# Patient Record
Sex: MALE | Race: WHITE | URBAN - METROPOLITAN AREA
[De-identification: names, ages, dates, MRNs, and addresses within clinical notes are randomized per-mention and may not be internally consistent; named-entity substitution may affect disease eponyms.]

---

## 2017-01-11 ENCOUNTER — APPOINTMENT (RX ONLY)
Dept: URBAN - METROPOLITAN AREA CLINIC 23 | Facility: CLINIC | Age: 72
Setting detail: DERMATOLOGY
End: 2017-01-11

## 2017-01-11 ENCOUNTER — RX ONLY (OUTPATIENT)
Age: 72
Setting detail: RX ONLY
End: 2017-01-11

## 2017-01-11 DIAGNOSIS — L72.8 OTHER FOLLICULAR CYSTS OF THE SKIN AND SUBCUTANEOUS TISSUE: ICD-10-CM

## 2017-01-11 DIAGNOSIS — L91.8 OTHER HYPERTROPHIC DISORDERS OF THE SKIN: ICD-10-CM

## 2017-01-11 DIAGNOSIS — L71.8 OTHER ROSACEA: ICD-10-CM | Status: STABLE

## 2017-01-11 DIAGNOSIS — D22 MELANOCYTIC NEVI: ICD-10-CM

## 2017-01-11 DIAGNOSIS — D18.0 HEMANGIOMA: ICD-10-CM

## 2017-01-11 DIAGNOSIS — L82.0 INFLAMED SEBORRHEIC KERATOSIS: ICD-10-CM

## 2017-01-11 PROBLEM — L85.3 XEROSIS CUTIS: Status: ACTIVE | Noted: 2017-01-11

## 2017-01-11 PROBLEM — D22.61 MELANOCYTIC NEVI OF RIGHT UPPER LIMB, INCLUDING SHOULDER: Status: ACTIVE | Noted: 2017-01-11

## 2017-01-11 PROBLEM — E13.9 OTHER SPECIFIED DIABETES MELLITUS WITHOUT COMPLICATIONS: Status: ACTIVE | Noted: 2017-01-11

## 2017-01-11 PROBLEM — D22.5 MELANOCYTIC NEVI OF TRUNK: Status: ACTIVE | Noted: 2017-01-11

## 2017-01-11 PROBLEM — D18.01 HEMANGIOMA OF SKIN AND SUBCUTANEOUS TISSUE: Status: ACTIVE | Noted: 2017-01-11

## 2017-01-11 PROCEDURE — ? OTHER

## 2017-01-11 PROCEDURE — 11300 SHAVE SKIN LESION 0.5 CM/<: CPT | Mod: 76

## 2017-01-11 PROCEDURE — ? TREATMENT REGIMEN

## 2017-01-11 PROCEDURE — 11300 SHAVE SKIN LESION 0.5 CM/<: CPT

## 2017-01-11 PROCEDURE — ? SHAVE REMOVAL

## 2017-01-11 PROCEDURE — 99214 OFFICE O/P EST MOD 30 MIN: CPT | Mod: 25

## 2017-01-11 PROCEDURE — ? SKIN TAG REMOVAL MULTI

## 2017-01-11 PROCEDURE — ? PRESCRIPTION

## 2017-01-11 PROCEDURE — 17110 DESTRUCTION B9 LES UP TO 14: CPT

## 2017-01-11 PROCEDURE — ? LIQUID NITROGEN

## 2017-01-11 PROCEDURE — ? COUNSELING

## 2017-01-11 PROCEDURE — 11200 RMVL SKIN TAGS UP TO&INC 15: CPT | Mod: 59

## 2017-01-11 RX ORDER — DOXYCYCLINE HYCLATE 50 MG/1
TABLET ORAL
Qty: 90 | Refills: 2 | Status: ERX

## 2017-01-11 ASSESSMENT — LOCATION DETAILED DESCRIPTION DERM
LOCATION DETAILED: LEFT CENTRAL TEMPLE
LOCATION DETAILED: LEFT INFERIOR LATERAL NECK
LOCATION DETAILED: LEFT LATERAL BUTTOCK
LOCATION DETAILED: LEFT CLAVICULAR SKIN
LOCATION DETAILED: RIGHT SUPERIOR MEDIAL UPPER BACK
LOCATION DETAILED: LEFT INFERIOR UPPER BACK
LOCATION DETAILED: RIGHT POSTERIOR SHOULDER
LOCATION DETAILED: LEFT SUPERIOR MEDIAL MALAR CHEEK
LOCATION DETAILED: EPIGASTRIC SKIN
LOCATION DETAILED: LEFT ANTERIOR MEDIAL PROXIMAL THIGH

## 2017-01-11 ASSESSMENT — LOCATION SIMPLE DESCRIPTION DERM
LOCATION SIMPLE: RIGHT SHOULDER
LOCATION SIMPLE: RIGHT UPPER BACK
LOCATION SIMPLE: LEFT CHEEK
LOCATION SIMPLE: LEFT BUTTOCK
LOCATION SIMPLE: LEFT CLAVICULAR SKIN
LOCATION SIMPLE: LEFT THIGH
LOCATION SIMPLE: LEFT UPPER BACK
LOCATION SIMPLE: LEFT ANTERIOR NECK
LOCATION SIMPLE: ABDOMEN
LOCATION SIMPLE: LEFT TEMPLE

## 2017-01-11 ASSESSMENT — LOCATION ZONE DERM
LOCATION ZONE: NECK
LOCATION ZONE: LEG
LOCATION ZONE: TRUNK
LOCATION ZONE: FACE
LOCATION ZONE: ARM

## 2017-01-11 NOTE — PROCEDURE: SKIN TAG REMOVAL MULTI
Total Number Of Lesions Treated: 6
Medical Necessity Clause: This procedure was medically necessary because the lesions that were treated were
Anesthesia Type: 1% lidocaine with 1:200,000 epinephrine and a 1:10 solution of 8.4% sodium bicarbonate
Consent: Written consent obtained and the risks of skin tag removal was reviewed with the patient including but not limited to bleeding, pigmentary change, infection, pain, and remote possibility of scarring.
Hemostasis: Aluminum Chloride
Include Z78.9 (Other Specified Conditions Influencing Health Status) As An Associated Diagnosis?: No
Medical Necessity Information: It is in your best interest to select a reason for this procedure from the list below. All of these items fulfill various CMS LCD requirements except the new and changing color options.
Detail Level: Detailed
Anesthesia Volume In Cc: 0

## 2017-01-11 NOTE — PROCEDURE: SHAVE REMOVAL
Biopsy Method: Double edge Personna blades
Wound Care: Petrolatum
Medical Necessity Clause: This procedure was medically necessary because the lesion is
Size Of Lesion In Cm (Required): 0
Hemostasis: Aluminum Chloride
Path Notes (To The Dermatopathologist): Please check margins.
Medical Necessity Information: It is in your best interest to select a reason for this procedure from the list below. All of these items fulfill various CMS LCD requirements except the new and changing color options.
Render Post-Care Instructions In Note?: no
Consent was obtained from the patient. The risks and benefits to therapy were discussed in detail. Specifically, the risks of infection, scarring, bleeding, prolonged wound healing, incomplete removal, allergy to anesthesia, nerve injury and recurrence were addressed. Prior to the procedure, the treatment site was clearly identified and confirmed by the patient. All components of Universal Protocol/PAUSE Rule completed.
Billing Type: Third-Party Bill
Anesthesia Volume In Cc: 0.5
Post-Care Instructions: I reviewed with the patient in detail post-care instructions. Patient is to keep the biopsy site dry overnight, and then apply bacitracin twice daily until healed. Patient may apply hydrogen peroxide soaks to remove any crusting.
Accession #: [id number]
Detail Level: Detailed
Notification Instructions: Patient will be notified of biopsy results. However, patient instructed to call the office if not contacted within 2 weeks.
Anesthesia Type: 0.5% lidocaine with 1:200,000 epinephrine and a 1:10 solution of 8.4% sodium bicarbonate
Size Of Lesion In Cm (Required): 0.3

## 2017-01-11 NOTE — PROCEDURE: OTHER
Detail Level: Detailed
Other (Free Text): Large open comedone, he wants it removed.
Note Text (......Xxx Chief Complaint.): This diagnosis correlates with the

## 2017-01-11 NOTE — PROCEDURE: MIPS QUALITY
Quality 110: Preventive Care And Screening: Influenza Immunization: Influenza Immunization previously received during influenza season
Detail Level: Detailed
Quality 130: Documentation Of Current Medications In The Medical Record: Current Medications Documented
Quality 111:Pneumonia Vaccination Status For Older Adults: Pneumococcal Vaccination Previously Received

## 2017-01-11 NOTE — PROCEDURE: LIQUID NITROGEN
Medical Necessity Information: It is in your best interest to select a reason for this procedure from the list below. All of these items fulfill various CMS LCD requirements except the new and changing color options.
Detail Level: Detailed
Post-Care Instructions: I reviewed with the patient in detail post-care instructions. Patient is to wear sunprotection, and avoid picking at any of the treated lesions. Pt may apply Vaseline to crusted or scabbing areas.
Duration Of Freeze Thaw-Cycle (Seconds): 5
Render Post-Care Instructions In Note?: no
Consent: The patient's verbal consent was obtained including but not limited to risks of crusting, scabbing, blistering, scarring, darker or lighter pigmentary change, recurrence, incomplete removal and infection.
Number Of Freeze-Thaw Cycles: 1 freeze-thaw cycle
Medical Necessity Clause: Treatment was medically necessary because the spot was getting

## 2017-01-14 ENCOUNTER — APPOINTMENT (RX ONLY)
Dept: URBAN - METROPOLITAN AREA CLINIC 23 | Facility: CLINIC | Age: 72
Setting detail: DERMATOLOGY
End: 2017-01-14

## 2017-01-14 DIAGNOSIS — L72.8 OTHER FOLLICULAR CYSTS OF THE SKIN AND SUBCUTANEOUS TISSUE: ICD-10-CM

## 2017-01-14 PROCEDURE — 12051 INTMD RPR FACE/MM 2.5 CM/<: CPT | Mod: 79

## 2017-01-14 PROCEDURE — ? EXCISION

## 2017-01-14 PROCEDURE — 11442 EXC FACE-MM B9+MARG 1.1-2 CM: CPT | Mod: 79

## 2017-01-14 ASSESSMENT — LOCATION SIMPLE DESCRIPTION DERM: LOCATION SIMPLE: LEFT TEMPLE

## 2017-01-14 ASSESSMENT — LOCATION ZONE DERM: LOCATION ZONE: FACE

## 2017-01-14 ASSESSMENT — LOCATION DETAILED DESCRIPTION DERM: LOCATION DETAILED: LEFT CENTRAL TEMPLE

## 2017-01-14 NOTE — PROCEDURE: EXCISION
Posterior Auricular Interpolation Flap Text: A decision was made to reconstruct the defect utilizing an interpolation axial flap and a staged reconstruction.  A telfa template was made of the defect.  This telfa template was then used to outline the posterior auricular interpolation flap.  The donor area for the pedicle flap was then injected with anesthesia.  The flap was excised through the skin and subcutaneous tissue down to the layer of the underlying musculature.  The pedicle flap was carefully excised within this deep plane to maintain its blood supply.  The edges of the donor site were undermined.   The donor site was closed in a primary fashion.  The pedicle was then rotated into position and sutured.  Once the tube was sutured into place, adequate blood supply was confirmed with blanching and refill.  The pedicle was then wrapped with xeroform gauze and dressed appropriately with a telfa and gauze bandage to ensure continued blood supply and protect the attached pedicle.
Trilobed Flap Text: The defect edges were debeveled with a #15 scalpel blade.  Given the location of the defect and the proximity to free margins a trilobed flap was deemed most appropriate.  Using a sterile surgical marker, an appropriate trilobed flap drawn around the defect.    The area thus outlined was incised deep to adipose tissue with a #15 scalpel blade.  The skin margins were undermined to an appropriate distance in all directions utilizing iris scissors.
Additional Primary Defect Length (In Cm): 0
Rhombic Flap Text: The defect edges were debeveled with a #15 scalpel blade.  Given the location of the defect and the proximity to free margins a rhombic flap was deemed most appropriate.  Using a sterile surgical marker, an appropriate rhombic flap was drawn incorporating the defect.    The area thus outlined was incised deep to adipose tissue with a #15 scalpel blade.  The skin margins were undermined to an appropriate distance in all directions utilizing iris scissors.
Modified Advancement Flap Text: The defect edges were debeveled with a #15 scalpel blade.  Given the location of the defect, shape of the defect and the proximity to free margins a modified advancement flap was deemed most appropriate.  Using a sterile surgical marker, an appropriate advancement flap was drawn incorporating the defect and placing the expected incisions within the relaxed skin tension lines where possible.    The area thus outlined was incised deep to adipose tissue with a #15 scalpel blade.  The skin margins were undermined to an appropriate distance in all directions utilizing iris scissors.
Complex Repair Preamble Text (Leave Blank If You Do Not Want): Extensive wide undermining was performed.
Complex Repair And Split-Thickness Skin Graft Text: The defect edges were debeveled with a #15 scalpel blade.  The primary defect was closed partially with a complex linear closure.  Given the location of the defect, shape of the defect and the proximity to free margins a split thickness skin graft was deemed most appropriate to repair the remaining defect.  The graft was trimmed to fit the size of the remaining defect.  The graft was then placed in the primary defect, oriented appropriately, and sutured into place.
Complex Repair And Xenograft Text: The defect edges were debeveled with a #15 scalpel blade.  The primary defect was closed partially with a complex linear closure.  Given the location of the defect, shape of the defect and the proximity to free margins an tissue cultured epidermal autograft was deemed most appropriate to repair the remaining defect.  The graft was trimmed to fit the size of the remaining defect.  The graft was then placed in the primary defect, oriented appropriately, and sutured into place.
O-Z Plasty Text: The defect edges were debeveled with a #15 scalpel blade.  Given the location of the defect, shape of the defect and the proximity to free margins an O-Z plasty (double transposition flap) was deemed most appropriate.  Using a sterile surgical marker, the appropriate transposition flaps were drawn incorporating the defect and placing the expected incisions within the relaxed skin tension lines where possible.    The area thus outlined was incised deep to adipose tissue with a #15 scalpel blade.  The skin margins were undermined to an appropriate distance in all directions utilizing iris scissors.  Hemostasis was achieved with electrocautery.  The flaps were then transposed into place, one clockwise and the other counterclockwise, and anchored with interrupted buried subcutaneous sutures.
Complex Repair And M Plasty Text: The defect edges were debeveled with a #15 scalpel blade.  The primary defect was closed partially with a complex linear closure.  Given the location of the remaining defect, shape of the defect and the proximity to free margins an M plasty was deemed most appropriate for complete closure of the defect.  Using a sterile surgical marker, an appropriate advancement flap was drawn incorporating the defect and placing the expected incisions within the relaxed skin tension lines where possible.    The area thus outlined was incised deep to adipose tissue with a #15 scalpel blade.  The skin margins were undermined to an appropriate distance in all directions utilizing iris scissors.
Excision Depth: adipose tissue
Dressing: pressure dressing
Epidermal Autograft Text: The defect edges were debeveled with a #15 scalpel blade.  Given the location of the defect, shape of the defect and the proximity to free margins an epidermal autograft was deemed most appropriate.  Using a sterile surgical marker, the primary defect shape was transferred to the donor site. The epidermal graft was then harvested.  The skin graft was then placed in the primary defect and oriented appropriately.
Bilobed Flap Text: The defect edges were debeveled with a #15 scalpel blade.  Given the location of the defect and the proximity to free margins a bilobe flap was deemed most appropriate.  Using a sterile surgical marker, an appropriate bilobe flap drawn around the defect.    The area thus outlined was incised deep to adipose tissue with a #15 scalpel blade.  The skin margins were undermined to an appropriate distance in all directions utilizing iris scissors.
Complex Repair And Epidermal Autograft Text: The defect edges were debeveled with a #15 scalpel blade.  The primary defect was closed partially with a complex linear closure.  Given the location of the defect, shape of the defect and the proximity to free margins an epidermal autograft was deemed most appropriate to repair the remaining defect.  The graft was trimmed to fit the size of the remaining defect.  The graft was then placed in the primary defect, oriented appropriately, and sutured into place.
W Plasty Text: The lesion was extirpated to the level of the fat with a #15 scalpel blade.  Given the location of the defect, shape of the defect and the proximity to free margins a W-plasty was deemed most appropriate for repair.  Using a sterile surgical marker, the appropriate transposition arms of the W-plasty were drawn incorporating the defect and placing the expected incisions within the relaxed skin tension lines where possible.    The area thus outlined was incised deep to adipose tissue with a #15 scalpel blade.  The skin margins were undermined to an appropriate distance in all directions utilizing iris scissors.  The opposing transposition arms were then transposed into place in opposite direction and anchored with interrupted buried subcutaneous sutures.
Mucosal Advancement Flap Text: Given the location of the defect, shape of the defect and the proximity to free margins a mucosal advancement flap was deemed most appropriate. Incisions were made with a 15 blade scalpel in the appropriate fashion along the cutaneous vermillion border and the mucosal lip. The remaining actinically damaged mucosal tissue was excised.  The mucosal advancement flap was then elevated to the gingival sulcus with care taken to preserve the neurovascular structures and advanced into the primary defect. Care was taken to ensure that precise realignment of the vermillion border was achieved.
Detail Level: Detailed
Bi-Rhombic Flap Text: The defect edges were debeveled with a #15 scalpel blade.  Given the location of the defect and the proximity to free margins a bi-rhombic flap was deemed most appropriate.  Using a sterile surgical marker, an appropriate rhombic flap was drawn incorporating the defect. The area thus outlined was incised deep to adipose tissue with a #15 scalpel blade.  The skin margins were undermined to an appropriate distance in all directions utilizing iris scissors.
Crescentic Advancement Flap Text: The defect edges were debeveled with a #15 scalpel blade.  Given the location of the defect and the proximity to free margins a crescentic advancement flap was deemed most appropriate.  Using a sterile surgical marker, the appropriate advancement flap was drawn incorporating the defect and placing the expected incisions within the relaxed skin tension lines where possible.    The area thus outlined was incised deep to adipose tissue with a #15 scalpel blade.  The skin margins were undermined to an appropriate distance in all directions utilizing iris scissors.
Wound Care: Vaseline
Complex Repair And Bilobe Flap Text: The defect edges were debeveled with a #15 scalpel blade.  The primary defect was closed partially with a complex linear closure.  Given the location of the remaining defect, shape of the defect and the proximity to free margins a bilobe flap was deemed most appropriate for complete closure of the defect.  Using a sterile surgical marker, an appropriate advancement flap was drawn incorporating the defect and placing the expected incisions within the relaxed skin tension lines where possible.    The area thus outlined was incised deep to adipose tissue with a #15 scalpel blade.  The skin margins were undermined to an appropriate distance in all directions utilizing iris scissors.
V-Y Flap Text: The defect edges were debeveled with a #15 scalpel blade.  Given the location of the defect, shape of the defect and the proximity to free margins a V-Y flap was deemed most appropriate.  Using a sterile surgical marker, an appropriate advancement flap was drawn incorporating the defect and placing the expected incisions within the relaxed skin tension lines where possible.    The area thus outlined was incised deep to adipose tissue with a #15 scalpel blade.  The skin margins were undermined to an appropriate distance in all directions utilizing iris scissors.
Spiral Flap Text: The defect edges were debeveled with a #15 scalpel blade.  Given the location of the defect, shape of the defect and the proximity to free margins a spiral flap was deemed most appropriate.  Using a sterile surgical marker, an appropriate rotation flap was drawn incorporating the defect and placing the expected incisions within the relaxed skin tension lines where possible. The area thus outlined was incised deep to adipose tissue with a #15 scalpel blade.  The skin margins were undermined to an appropriate distance in all directions utilizing iris scissors.
Complex Repair And W Plasty Text: The defect edges were debeveled with a #15 scalpel blade.  The primary defect was closed partially with a complex linear closure.  Given the location of the remaining defect, shape of the defect and the proximity to free margins a W plasty was deemed most appropriate for complete closure of the defect.  Using a sterile surgical marker, an appropriate advancement flap was drawn incorporating the defect and placing the expected incisions within the relaxed skin tension lines where possible.    The area thus outlined was incised deep to adipose tissue with a #15 scalpel blade.  The skin margins were undermined to an appropriate distance in all directions utilizing iris scissors.
Island Pedicle Flap With Canthal Suspension Text: The defect edges were debeveled with a #15 scalpel blade.  Given the location of the defect, shape of the defect and the proximity to free margins an island pedicle advancement flap was deemed most appropriate.  Using a sterile surgical marker, an appropriate advancement flap was drawn incorporating the defect, outlining the appropriate donor tissue and placing the expected incisions within the relaxed skin tension lines where possible. The area thus outlined was incised deep to adipose tissue with a #15 scalpel blade.  The skin margins were undermined to an appropriate distance in all directions around the primary defect and laterally outward around the island pedicle utilizing iris scissors.  There was minimal undermining beneath the pedicle flap. A suspension suture was placed in the canthal tendon to prevent tension and prevent ectropion.
Include Z78.9 (Other Specified Conditions Influencing Health Status) As An Associated Diagnosis?: No
Advancement Flap (Single) Text: The defect edges were debeveled with a #15 scalpel blade.  Given the location of the defect and the proximity to free margins a single advancement flap was deemed most appropriate.  Using a sterile surgical marker, an appropriate advancement flap was drawn incorporating the defect and placing the expected incisions within the relaxed skin tension lines where possible.    The area thus outlined was incised deep to adipose tissue with a #15 scalpel blade.  The skin margins were undermined to an appropriate distance in all directions utilizing iris scissors.
Complex Repair And Single Advancement Flap Text: The defect edges were debeveled with a #15 scalpel blade.  The primary defect was closed partially with a complex linear closure.  Given the location of the remaining defect, shape of the defect and the proximity to free margins a single advancement flap was deemed most appropriate for complete closure of the defect.  Using a sterile surgical marker, an appropriate advancement flap was drawn incorporating the defect and placing the expected incisions within the relaxed skin tension lines where possible.    The area thus outlined was incised deep to adipose tissue with a #15 scalpel blade.  The skin margins were undermined to an appropriate distance in all directions utilizing iris scissors.
Size Of Margin In Cm: 0.1
Epidermal Closure: running
Path Notes (To The Dermatopathologist): Please check margins.
Medical Necessity Clause: This procedure was medically necessary because the lesion that was treated was:
Burow's Advancement Flap Text: The defect edges were debeveled with a #15 scalpel blade.  Given the location of the defect and the proximity to free margins a Burow's advancement flap was deemed most appropriate.  Using a sterile surgical marker, the appropriate advancement flap was drawn incorporating the defect and placing the expected incisions within the relaxed skin tension lines where possible.    The area thus outlined was incised deep to adipose tissue with a #15 scalpel blade.  The skin margins were undermined to an appropriate distance in all directions utilizing iris scissors.
Bilateral Helical Rim Advancement Flap Text: The defect edges were debeveled with a #15 blade scalpel.  Given the location of the defect and the proximity to free margins (helical rim) a bilateral helical rim advancement flap was deemed most appropriate.  Using a sterile surgical marker, the appropriate advancement flaps were drawn incorporating the defect and placing the expected incisions between the helical rim and antihelix where possible.  The area thus outlined was incised through and through with a #15 scalpel blade.  With a skin hook and iris scissors, the flaps were gently and sharply undermined and freed up.
Complex Repair And Modified Advancement Flap Text: The defect edges were debeveled with a #15 scalpel blade.  The primary defect was closed partially with a complex linear closure.  Given the location of the remaining defect, shape of the defect and the proximity to free margins a modified advancement flap was deemed most appropriate for complete closure of the defect.  Using a sterile surgical marker, an appropriate advancement flap was drawn incorporating the defect and placing the expected incisions within the relaxed skin tension lines where possible.    The area thus outlined was incised deep to adipose tissue with a #15 scalpel blade.  The skin margins were undermined to an appropriate distance in all directions utilizing iris scissors.
Billing Type: Third-Party Bill
Hatchet Flap Text: The defect edges were debeveled with a #15 scalpel blade.  Given the location of the defect, shape of the defect and the proximity to free margins a hatchet flap was deemed most appropriate.  Using a sterile surgical marker, an appropriate hatchet flap was drawn incorporating the defect and placing the expected incisions within the relaxed skin tension lines where possible.    The area thus outlined was incised deep to adipose tissue with a #15 scalpel blade.  The skin margins were undermined to an appropriate distance in all directions utilizing iris scissors.
Saucerization Excision Additional Text (Leave Blank If You Do Not Want): The margin was drawn around the clinically apparent lesion.  Incisions were then made along these lines, in a tangential fashion, to the appropriate tissue plane and the lesion was extirpated.
Intermediate Repair Preamble Text (Leave Blank If You Do Not Want): Undermining was performed with blunt dissection.
Muscle Hinge Flap Text: The defect edges were debeveled with a #15 scalpel blade.  Given the size, depth and location of the defect and the proximity to free margins a muscle hinge flap was deemed most appropriate.  Using a sterile surgical marker, an appropriate hinge flap was drawn incorporating the defect. The area thus outlined was incised with a #15 scalpel blade.  The skin margins were undermined to an appropriate distance in all directions utilizing iris scissors.
Complex Repair And Transposition Flap Text: The defect edges were debeveled with a #15 scalpel blade.  The primary defect was closed partially with a complex linear closure.  Given the location of the remaining defect, shape of the defect and the proximity to free margins a transposition flap was deemed most appropriate for complete closure of the defect.  Using a sterile surgical marker, an appropriate advancement flap was drawn incorporating the defect and placing the expected incisions within the relaxed skin tension lines where possible.    The area thus outlined was incised deep to adipose tissue with a #15 scalpel blade.  The skin margins were undermined to an appropriate distance in all directions utilizing iris scissors.
Ftsg Text: The defect edges were debeveled with a #15 scalpel blade.  Given the location of the defect, shape of the defect and the proximity to free margins a full thickness skin graft was deemed most appropriate.  Using a sterile surgical marker, the primary defect shape was transferred to the donor site. The area thus outlined was incised deep to adipose tissue with a #15 scalpel blade.  The harvested graft was then trimmed of adipose tissue until only dermis and epidermis was left.  The skin margins of the secondary defect were undermined to an appropriate distance in all directions utilizing iris scissors.  The secondary defect was closed with interrupted buried subcutaneous sutures.  The skin edges were then re-apposed with running  sutures.  The skin graft was then placed in the primary defect and oriented appropriately.
Complex Repair And A-T Advancement Flap Text: The defect edges were debeveled with a #15 scalpel blade.  The primary defect was closed partially with a complex linear closure.  Given the location of the remaining defect, shape of the defect and the proximity to free margins an A-T advancement flap was deemed most appropriate for complete closure of the defect.  Using a sterile surgical marker, an appropriate advancement flap was drawn incorporating the defect and placing the expected incisions within the relaxed skin tension lines where possible.    The area thus outlined was incised deep to adipose tissue with a #15 scalpel blade.  The skin margins were undermined to an appropriate distance in all directions utilizing iris scissors.
Island Pedicle Flap-Requiring Vessel Identification Text: The defect edges were debeveled with a #15 scalpel blade.  Given the location of the defect, shape of the defect and the proximity to free margins an island pedicle advancement flap was deemed most appropriate.  Using a sterile surgical marker, an appropriate advancement flap was drawn, based on the axial vessel mentioned above, incorporating the defect, outlining the appropriate donor tissue and placing the expected incisions within the relaxed skin tension lines where possible.    The area thus outlined was incised deep to adipose tissue with a #15 scalpel blade.  The skin margins were undermined to an appropriate distance in all directions around the primary defect and laterally outward around the island pedicle utilizing iris scissors.  There was minimal undermining beneath the pedicle flap.
Complex Repair And Double Advancement Flap Text: The defect edges were debeveled with a #15 scalpel blade.  The primary defect was closed partially with a complex linear closure.  Given the location of the remaining defect, shape of the defect and the proximity to free margins a double advancement flap was deemed most appropriate for complete closure of the defect.  Using a sterile surgical marker, an appropriate advancement flap was drawn incorporating the defect and placing the expected incisions within the relaxed skin tension lines where possible.    The area thus outlined was incised deep to adipose tissue with a #15 scalpel blade.  The skin margins were undermined to an appropriate distance in all directions utilizing iris scissors.
Complex Repair And O-L Flap Text: The defect edges were debeveled with a #15 scalpel blade.  The primary defect was closed partially with a complex linear closure.  Given the location of the remaining defect, shape of the defect and the proximity to free margins an O-L flap was deemed most appropriate for complete closure of the defect.  Using a sterile surgical marker, an appropriate flap was drawn incorporating the defect and placing the expected incisions within the relaxed skin tension lines where possible.    The area thus outlined was incised deep to adipose tissue with a #15 scalpel blade.  The skin margins were undermined to an appropriate distance in all directions utilizing iris scissors.
Bilobed Transposition Flap Text: The defect edges were debeveled with a #15 scalpel blade.  Given the location of the defect and the proximity to free margins a bilobed transposition flap was deemed most appropriate.  Using a sterile surgical marker, an appropriate bilobe flap drawn around the defect.    The area thus outlined was incised deep to adipose tissue with a #15 scalpel blade.  The skin margins were undermined to an appropriate distance in all directions utilizing iris scissors.
Complex Repair And Z Plasty Text: The defect edges were debeveled with a #15 scalpel blade.  The primary defect was closed partially with a complex linear closure.  Given the location of the remaining defect, shape of the defect and the proximity to free margins a Z plasty was deemed most appropriate for complete closure of the defect.  Using a sterile surgical marker, an appropriate advancement flap was drawn incorporating the defect and placing the expected incisions within the relaxed skin tension lines where possible.    The area thus outlined was incised deep to adipose tissue with a #15 scalpel blade.  The skin margins were undermined to an appropriate distance in all directions utilizing iris scissors.
Tissue Cultured Epidermal Autograft Text: The defect edges were debeveled with a #15 scalpel blade.  Given the location of the defect, shape of the defect and the proximity to free margins a tissue cultured epidermal autograft was deemed most appropriate.  The graft was then trimmed to fit the size of the defect.  The graft was then placed in the primary defect and oriented appropriately.
Fusiform Excision Additional Text (Leave Blank If You Do Not Want): The margin was drawn around the clinically apparent lesion.  A fusiform shape was then drawn on the skin incorporating the lesion and margins.  Incisions were then made along these lines to the appropriate tissue plane and the lesion was extirpated.
V-Y Plasty Text: The defect edges were debeveled with a #15 scalpel blade.  Given the location of the defect, shape of the defect and the proximity to free margins an V-Y advancement flap was deemed most appropriate.  Using a sterile surgical marker, an appropriate advancement flap was drawn incorporating the defect and placing the expected incisions within the relaxed skin tension lines where possible.    The area thus outlined was incised deep to adipose tissue with a #15 scalpel blade.  The skin margins were undermined to an appropriate distance in all directions utilizing iris scissors.
Accession #: [id number]
Repair Type: Intermediate
Complex Repair And Skin Substitute Graft Text: The defect edges were debeveled with a #15 scalpel blade.  The primary defect was closed partially with a complex linear closure.  Given the location of the remaining defect, shape of the defect and the proximity to free margins a skin substitute graft was deemed most appropriate to repair the remaining defect.  The graft was trimmed to fit the size of the remaining defect.  The graft was then placed in the primary defect, oriented appropriately, and sutured into place.
Suture Removal: 7 days
Double Island Pedicle Flap Text: The defect edges were debeveled with a #15 scalpel blade.  Given the location of the defect, shape of the defect and the proximity to free margins a double island pedicle advancement flap was deemed most appropriate.  Using a sterile surgical marker, an appropriate advancement flap was drawn incorporating the defect, outlining the appropriate donor tissue and placing the expected incisions within the relaxed skin tension lines where possible.    The area thus outlined was incised deep to adipose tissue with a #15 scalpel blade.  The skin margins were undermined to an appropriate distance in all directions around the primary defect and laterally outward around the island pedicle utilizing iris scissors.  There was minimal undermining beneath the pedicle flap.
Z Plasty Text: The lesion was extirpated to the level of the fat with a #15 scalpel blade.  Given the location of the defect, shape of the defect and the proximity to free margins a Z-plasty was deemed most appropriate for repair.  Using a sterile surgical marker, the appropriate transposition arms of the Z-plasty were drawn incorporating the defect and placing the expected incisions within the relaxed skin tension lines where possible.    The area thus outlined was incised deep to adipose tissue with a #15 scalpel blade.  The skin margins were undermined to an appropriate distance in all directions utilizing iris scissors.  The opposing transposition arms were then transposed into place in opposite direction and anchored with interrupted buried subcutaneous sutures.
Excision Method: Elliptical
Home Suture Removal Text: Patient was provided a home suture removal kit and will remove their sutures at home.  If they have any questions or difficulties they will call the office.
Complex Repair And Rhombic Flap Text: The defect edges were debeveled with a #15 scalpel blade.  The primary defect was closed partially with a complex linear closure.  Given the location of the remaining defect, shape of the defect and the proximity to free margins a rhombic flap was deemed most appropriate for complete closure of the defect.  Using a sterile surgical marker, an appropriate advancement flap was drawn incorporating the defect and placing the expected incisions within the relaxed skin tension lines where possible.    The area thus outlined was incised deep to adipose tissue with a #15 scalpel blade.  The skin margins were undermined to an appropriate distance in all directions utilizing iris scissors.
Island Pedicle Flap Text: The defect edges were debeveled with a #15 scalpel blade.  Given the location of the defect, shape of the defect and the proximity to free margins an island pedicle advancement flap was deemed most appropriate.  Using a sterile surgical marker, an appropriate advancement flap was drawn incorporating the defect, outlining the appropriate donor tissue and placing the expected incisions within the relaxed skin tension lines where possible.    The area thus outlined was incised deep to adipose tissue with a #15 scalpel blade.  The skin margins were undermined to an appropriate distance in all directions around the primary defect and laterally outward around the island pedicle utilizing iris scissors.  There was minimal undermining beneath the pedicle flap.
Complex Repair And Dorsal Nasal Flap Text: The defect edges were debeveled with a #15 scalpel blade.  The primary defect was closed partially with a complex linear closure.  Given the location of the remaining defect, shape of the defect and the proximity to free margins a dorsal nasal flap was deemed most appropriate for complete closure of the defect.  Using a sterile surgical marker, an appropriate flap was drawn incorporating the defect and placing the expected incisions within the relaxed skin tension lines where possible.    The area thus outlined was incised deep to adipose tissue with a #15 scalpel blade.  The skin margins were undermined to an appropriate distance in all directions utilizing iris scissors.
O-T Plasty Text: The defect edges were debeveled with a #15 scalpel blade.  Given the location of the defect, shape of the defect and the proximity to free margins an O-T plasty was deemed most appropriate.  Using a sterile surgical marker, an appropriate O-T plasty was drawn incorporating the defect and placing the expected incisions within the relaxed skin tension lines where possible.    The area thus outlined was incised deep to adipose tissue with a #15 scalpel blade.  The skin margins were undermined to an appropriate distance in all directions utilizing iris scissors.
Mastoid Interpolation Flap Text: A decision was made to reconstruct the defect utilizing an interpolation axial flap and a staged reconstruction.  A telfa template was made of the defect.  This telfa template was then used to outline the mastoid interpolation flap.  The donor area for the pedicle flap was then injected with anesthesia.  The flap was excised through the skin and subcutaneous tissue down to the layer of the underlying musculature.  The pedicle flap was carefully excised within this deep plane to maintain its blood supply.  The edges of the donor site were undermined.   The donor site was closed in a primary fashion.  The pedicle was then rotated into position and sutured.  Once the tube was sutured into place, adequate blood supply was confirmed with blanching and refill.  The pedicle was then wrapped with xeroform gauze and dressed appropriately with a telfa and gauze bandage to ensure continued blood supply and protect the attached pedicle.
Helical Rim Advancement Flap Text: The defect edges were debeveled with a #15 blade scalpel.  Given the location of the defect and the proximity to free margins (helical rim) a double helical rim advancement flap was deemed most appropriate.  Using a sterile surgical marker, the appropriate advancement flaps were drawn incorporating the defect and placing the expected incisions between the helical rim and antihelix where possible.  The area thus outlined was incised through and through with a #15 scalpel blade.  With a skin hook and iris scissors, the flaps were gently and sharply undermined and freed up.
Intermediate / Complex Repair - Final Wound Length In Cm: 2.1
Epidermal Sutures: 5-0 Prolene
Consent was obtained from the patient. The risks and benefits to therapy were discussed in detail. Specifically, the risks of infection, scarring, bleeding, prolonged wound healing, incomplete removal, allergy to anesthesia, nerve injury and recurrence were addressed. Prior to the procedure, the treatment site was clearly identified and confirmed by the patient.
Dorsal Nasal Flap Text: The defect edges were debeveled with a #15 scalpel blade.  Given the location of the defect and the proximity to free margins a dorsal nasal flap was deemed most appropriate.  Using a sterile surgical marker, an appropriate dorsal nasal flap was drawn around the defect.    The area thus outlined was incised deep to adipose tissue with a #15 scalpel blade.  The skin margins were undermined to an appropriate distance in all directions utilizing iris scissors.
Excisional Biopsy Additional Text (Leave Blank If You Do Not Want): The margin was drawn around the clinically apparent lesion.  An elliptical shape was then drawn on the skin incorporating the lesion and margins.  Incisions were then made along these lines to the appropriate tissue plane and the lesion was extirpated.
O-L Flap Text: The defect edges were debeveled with a #15 scalpel blade.  Given the location of the defect, shape of the defect and the proximity to free margins an O-L flap was deemed most appropriate.  Using a sterile surgical marker, an appropriate advancement flap was drawn incorporating the defect and placing the expected incisions within the relaxed skin tension lines where possible.    The area thus outlined was incised deep to adipose tissue with a #15 scalpel blade.  The skin margins were undermined to an appropriate distance in all directions utilizing iris scissors.
Alar Island Pedicle Flap Text: The defect edges were debeveled with a #15 scalpel blade.  Given the location of the defect, shape of the defect and the proximity to the alar rim an island pedicle advancement flap was deemed most appropriate.  Using a sterile surgical marker, an appropriate advancement flap was drawn incorporating the defect, outlining the appropriate donor tissue and placing the expected incisions within the nasal ala running parallel to the alar rim. The area thus outlined was incised with a #15 scalpel blade.  The skin margins were undermined minimally to an appropriate distance in all directions around the primary defect and laterally outward around the island pedicle utilizing iris scissors.  There was minimal undermining beneath the pedicle flap.
Hemostasis: Electrocautery
Complex Repair And V-Y Plasty Text: The defect edges were debeveled with a #15 scalpel blade.  The primary defect was closed partially with a complex linear closure.  Given the location of the remaining defect, shape of the defect and the proximity to free margins a V-Y plasty was deemed most appropriate for complete closure of the defect.  Using a sterile surgical marker, an appropriate advancement flap was drawn incorporating the defect and placing the expected incisions within the relaxed skin tension lines where possible.    The area thus outlined was incised deep to adipose tissue with a #15 scalpel blade.  The skin margins were undermined to an appropriate distance in all directions utilizing iris scissors.
Complex Repair And Ftsg Text: The defect edges were debeveled with a #15 scalpel blade.  The primary defect was closed partially with a complex linear closure.  Given the location of the defect, shape of the defect and the proximity to free margins a full thickness skin graft was deemed most appropriate to repair the remaining defect.  The graft was trimmed to fit the size of the remaining defect.  The graft was then placed in the primary defect, oriented appropriately, and sutured into place.
Paramedian Forehead Flap Text: A decision was made to reconstruct the defect utilizing an interpolation axial flap and a staged reconstruction.  A telfa template was made of the defect.  This telfa template was then used to outline the paramedian forehead pedicle flap.  The donor area for the pedicle flap was then injected with anesthesia.  The flap was excised through the skin and subcutaneous tissue down to the layer of the underlying musculature.  The pedicle flap was carefully excised within this deep plane to maintain its blood supply.  The edges of the donor site were undermined.   The donor site was closed in a primary fashion.  The pedicle was then rotated into position and sutured.  Once the tube was sutured into place, adequate blood supply was confirmed with blanching and refill.  The pedicle was then wrapped with xeroform gauze and dressed appropriately with a telfa and gauze bandage to ensure continued blood supply and protect the attached pedicle.
Lip Wedge Excision Repair Text: Given the location of the defect and the proximity to free margins a full thickness wedge repair was deemed most appropriate.  Using a sterile surgical marker, the appropriate repair was drawn incorporating the defect and placing the expected incisions perpendicular to the vermillion border.  The vermillion border was also meticulously outlined to ensure appropriate reapproximation during the repair.  The area thus outlined was incised through and through with a #15 scalpel blade.  The muscularis and dermis were reaproximated with deep sutures following hemostasis. Care was taken to realign the vermillion border before proceeding with the superficial closure.  Once the vermillion was realigned the superfical and mucosal closure was finished.
Advancement Flap (Double) Text: The defect edges were debeveled with a #15 scalpel blade.  Given the location of the defect and the proximity to free margins a double advancement flap was deemed most appropriate.  Using a sterile surgical marker, the appropriate advancement flaps were drawn incorporating the defect and placing the expected incisions within the relaxed skin tension lines where possible.    The area thus outlined was incised deep to adipose tissue with a #15 scalpel blade.  The skin margins were undermined to an appropriate distance in all directions utilizing iris scissors.
Anesthesia Type: 1% lidocaine with epinephrine
Medical Necessity Clause: The excision was medically necessary because the lesion which was excised was:
Complex Repair And Melolabial Flap Text: The defect edges were debeveled with a #15 scalpel blade.  The primary defect was closed partially with a complex linear closure.  Given the location of the remaining defect, shape of the defect and the proximity to free margins a melolabial flap was deemed most appropriate for complete closure of the defect.  Using a sterile surgical marker, an appropriate advancement flap was drawn incorporating the defect and placing the expected incisions within the relaxed skin tension lines where possible.    The area thus outlined was incised deep to adipose tissue with a #15 scalpel blade.  The skin margins were undermined to an appropriate distance in all directions utilizing iris scissors.
Cartilage Graft Text: The defect edges were debeveled with a #15 scalpel blade.  Given the location of the defect, shape of the defect, the fact the defect involved a full thickness cartilage defect a cartilage graft was deemed most appropriate.  An appropriate donor site was identified, cleansed, and anesthetized. The cartilage graft was then harvested and transferred to the recipient site, oriented appropriately and then sutured into place.  The secondary defect was then repaired using a primary closure.
Complex Repair And O-T Advancement Flap Text: The defect edges were debeveled with a #15 scalpel blade.  The primary defect was closed partially with a complex linear closure.  Given the location of the remaining defect, shape of the defect and the proximity to free margins an O-T advancement flap was deemed most appropriate for complete closure of the defect.  Using a sterile surgical marker, an appropriate advancement flap was drawn incorporating the defect and placing the expected incisions within the relaxed skin tension lines where possible.    The area thus outlined was incised deep to adipose tissue with a #15 scalpel blade.  The skin margins were undermined to an appropriate distance in all directions utilizing iris scissors.
Deep Sutures: 5-0 Vicryl
Dermal Autograft Text: The defect edges were debeveled with a #15 scalpel blade.  Given the location of the defect, shape of the defect and the proximity to free margins a dermal autograft was deemed most appropriate.  Using a sterile surgical marker, the primary defect shape was transferred to the donor site. The area thus outlined was incised deep to adipose tissue with a #15 scalpel blade.  The harvested graft was then trimmed of adipose and epidermal tissue until only dermis was left.  The skin graft was then placed in the primary defect and oriented appropriately.
Xenograft Text: The defect edges were debeveled with a #15 scalpel blade.  Given the location of the defect, shape of the defect and the proximity to free margins a xenograft was deemed most appropriate.  The graft was then trimmed to fit the size of the defect.  The graft was then placed in the primary defect and oriented appropriately.
Purse String (Intermediate) Text: Given the location of the defect and the characteristics of the surrounding skin a pursestring intermediate closure was deemed most appropriate.  Undermining was performed circumfirentially around the surgical defect.  A purstring suture was then placed and tightened.
Skin Substitute Text: The defect edges were debeveled with a #15 scalpel blade.  Given the location of the defect, shape of the defect and the proximity to free margins a skin substitute graft was deemed most appropriate.  The graft material was trimmed to fit the size of the defect. The graft was then placed in the primary defect and oriented appropriately.
Melolabial Transposition Flap Text: The defect edges were debeveled with a #15 scalpel blade.  Given the location of the defect and the proximity to free margins a melolabial flap was deemed most appropriate.  Using a sterile surgical marker, an appropriate melolabial transposition flap was drawn incorporating the defect.    The area thus outlined was incised deep to adipose tissue with a #15 scalpel blade.  The skin margins were undermined to an appropriate distance in all directions utilizing iris scissors.
Complex Repair And Dermal Autograft Text: The defect edges were debeveled with a #15 scalpel blade.  The primary defect was closed partially with a complex linear closure.  Given the location of the defect, shape of the defect and the proximity to free margins an dermal autograft was deemed most appropriate to repair the remaining defect.  The graft was trimmed to fit the size of the remaining defect.  The graft was then placed in the primary defect, oriented appropriately, and sutured into place.
Post-Care Instructions: I reviewed with the patient in detail post-care instructions. Patient is not to engage in any heavy lifting, exercise, or swimming for the next 14 days. Should the patient develop any fevers, chills, bleeding, severe pain patient will contact the office immediately.
Scalpel Size: 15C blade
Composite Graft Text: The defect edges were debeveled with a #15 scalpel blade.  Given the location of the defect, shape of the defect, the proximity to free margins and the fact the defect was full thickness a composite graft was deemed most appropriate.  The defect was outline and then transferred to the donor site.  A full thickness graft was then excised from the donor site. The graft was then placed in the primary defect, oriented appropriately and then sutured into place.  The secondary defect was then repaired using a primary closure.
No Repair - Repaired With Adjacent Surgical Defect Text (Leave Blank If You Do Not Want): After the excision the defect was repaired concurrently with another surgical defect which was in close approximation.
Size Of Lesion In Cm: 1
Estimated Blood Loss (Cc): minimal
Ear Star Wedge Flap Text: The defect edges were debeveled with a #15 blade scalpel.  Given the location of the defect and the proximity to free margins (helical rim) an ear star wedge flap was deemed most appropriate.  Using a sterile surgical marker, the appropriate flap was drawn incorporating the defect and placing the expected incisions between the helical rim and antihelix where possible.  The area thus outlined was incised through and through with a #15 scalpel blade.
Rotation Flap Text: The defect edges were debeveled with a #15 scalpel blade.  Given the location of the defect, shape of the defect and the proximity to free margins a rotation flap was deemed most appropriate.  Using a sterile surgical marker, an appropriate rotation flap was drawn incorporating the defect and placing the expected incisions within the relaxed skin tension lines where possible.    The area thus outlined was incised deep to adipose tissue with a #15 scalpel blade.  The skin margins were undermined to an appropriate distance in all directions utilizing iris scissors.
Cheek-To-Nose Interpolation Flap Text: A decision was made to reconstruct the defect utilizing an interpolation axial flap and a staged reconstruction.  A telfa template was made of the defect.  This telfa template was then used to outline the Cheek-To-Nose Interpolation flap.  The donor area for the pedicle flap was then injected with anesthesia.  The flap was excised through the skin and subcutaneous tissue down to the layer of the underlying musculature.  The interpolation flap was carefully excised within this deep plane to maintain its blood supply.  The edges of the donor site were undermined.   The donor site was closed in a primary fashion.  The pedicle was then rotated into position and sutured.  Once the tube was sutured into place, adequate blood supply was confirmed with blanching and refill.  The pedicle was then wrapped with xeroform gauze and dressed appropriately with a telfa and gauze bandage to ensure continued blood supply and protect the attached pedicle.
Melolabial Interpolation Flap Text: A decision was made to reconstruct the defect utilizing an interpolation axial flap and a staged reconstruction.  A telfa template was made of the defect.  This telfa template was then used to outline the melolabial interpolation flap.  The donor area for the pedicle flap was then injected with anesthesia.  The flap was excised through the skin and subcutaneous tissue down to the layer of the underlying musculature.  The pedicle flap was carefully excised within this deep plane to maintain its blood supply.  The edges of the donor site were undermined.   The donor site was closed in a primary fashion.  The pedicle was then rotated into position and sutured.  Once the tube was sutured into place, adequate blood supply was confirmed with blanching and refill.  The pedicle was then wrapped with xeroform gauze and dressed appropriately with a telfa and gauze bandage to ensure continued blood supply and protect the attached pedicle.
S Plasty Text: Given the location and shape of the defect, and the orientation of relaxed skin tension lines, an S-plasty was deemed most appropriate for repair.  Using a sterile surgical marker, the appropriate outline of the S-plasty was drawn, incorporating the defect and placing the expected incisions within the relaxed skin tension lines where possible.  The area thus outlined was incised deep to adipose tissue with a #15 scalpel blade.  The skin margins were undermined to an appropriate distance in all directions utilizing iris scissors. The skin flaps were advanced over the defect.  The opposing margins were then approximated with interrupted buried subcutaneous sutures.
Slit Excision Additional Text (Leave Blank If You Do Not Want): A linear line was drawn on the skin overlying the lesion. An incision was made slowly until the lesion was visualized.  Once visualized, the lesion was removed with blunt dissection.
Anesthesia Volume In Cc: 5
A-T Advancement Flap Text: The defect edges were debeveled with a #15 scalpel blade.  Given the location of the defect, shape of the defect and the proximity to free margins an A-T advancement flap was deemed most appropriate.  Using a sterile surgical marker, an appropriate advancement flap was drawn incorporating the defect and placing the expected incisions within the relaxed skin tension lines where possible.    The area thus outlined was incised deep to adipose tissue with a #15 scalpel blade.  The skin margins were undermined to an appropriate distance in all directions utilizing iris scissors.
H Plasty Text: Given the location of the defect, shape of the defect and the proximity to free margins a H-plasty was deemed most appropriate for repair.  Using a sterile surgical marker, the appropriate advancement arms of the H-plasty were drawn incorporating the defect and placing the expected incisions within the relaxed skin tension lines where possible. The area thus outlined was incised deep to adipose tissue with a #15 scalpel blade. The skin margins were undermined to an appropriate distance in all directions utilizing iris scissors.  The opposing advancement arms were then advanced into place in opposite direction and anchored with interrupted buried subcutaneous sutures.
Transposition Flap Text: The defect edges were debeveled with a #15 scalpel blade.  Given the location of the defect and the proximity to free margins a transposition flap was deemed most appropriate.  Using a sterile surgical marker, an appropriate transposition flap was drawn incorporating the defect.    The area thus outlined was incised deep to adipose tissue with a #15 scalpel blade.  The skin margins were undermined to an appropriate distance in all directions utilizing iris scissors.
Complex Repair And Rotation Flap Text: The defect edges were debeveled with a #15 scalpel blade.  The primary defect was closed partially with a complex linear closure.  Given the location of the remaining defect, shape of the defect and the proximity to free margins a rotation flap was deemed most appropriate for complete closure of the defect.  Using a sterile surgical marker, an appropriate advancement flap was drawn incorporating the defect and placing the expected incisions within the relaxed skin tension lines where possible.    The area thus outlined was incised deep to adipose tissue with a #15 scalpel blade.  The skin margins were undermined to an appropriate distance in all directions utilizing iris scissors.
Interpolation Flap Text: A decision was made to reconstruct the defect utilizing an interpolation axial flap and a staged reconstruction.  A telfa template was made of the defect.  This telfa template was then used to outline the interpolation flap.  The donor area for the pedicle flap was then injected with anesthesia.  The flap was excised through the skin and subcutaneous tissue down to the layer of the underlying musculature.  The interpolation flap was carefully excised within this deep plane to maintain its blood supply.  The edges of the donor site were undermined.   The donor site was closed in a primary fashion.  The pedicle was then rotated into position and sutured.  Once the tube was sutured into place, adequate blood supply was confirmed with blanching and refill.  The pedicle was then wrapped with xeroform gauze and dressed appropriately with a telfa and gauze bandage to ensure continued blood supply and protect the attached pedicle.
Medical Necessity Information: It is in your best interest to select a reason for this procedure from the list below. All of these items fulfill various CMS LCD requirements except lesion extends to a margin.
Cheek Interpolation Flap Text: A decision was made to reconstruct the defect utilizing an interpolation axial flap and a staged reconstruction.  A telfa template was made of the defect.  This telfa template was then used to outline the Cheek Interpolation flap.  The donor area for the pedicle flap was then injected with anesthesia.  The flap was excised through the skin and subcutaneous tissue down to the layer of the underlying musculature.  The interpolation flap was carefully excised within this deep plane to maintain its blood supply.  The edges of the donor site were undermined.   The donor site was closed in a primary fashion.  The pedicle was then rotated into position and sutured.  Once the tube was sutured into place, adequate blood supply was confirmed with blanching and refill.  The pedicle was then wrapped with xeroform gauze and dressed appropriately with a telfa and gauze bandage to ensure continued blood supply and protect the attached pedicle.
O-T Advancement Flap Text: The defect edges were debeveled with a #15 scalpel blade.  Given the location of the defect, shape of the defect and the proximity to free margins an O-T advancement flap was deemed most appropriate.  Using a sterile surgical marker, an appropriate advancement flap was drawn incorporating the defect and placing the expected incisions within the relaxed skin tension lines where possible.    The area thus outlined was incised deep to adipose tissue with a #15 scalpel blade.  The skin margins were undermined to an appropriate distance in all directions utilizing iris scissors.
Split-Thickness Skin Graft Text: The defect edges were debeveled with a #15 scalpel blade.  Given the location of the defect, shape of the defect and the proximity to free margins a split thickness skin graft was deemed most appropriate.  Using a sterile surgical marker, the primary defect shape was transferred to the donor site. The split thickness graft was then harvested.  The skin graft was then placed in the primary defect and oriented appropriately.
Complex Repair And Double M Plasty Text: The defect edges were debeveled with a #15 scalpel blade.  The primary defect was closed partially with a complex linear closure.  Given the location of the remaining defect, shape of the defect and the proximity to free margins a double M plasty was deemed most appropriate for complete closure of the defect.  Using a sterile surgical marker, an appropriate advancement flap was drawn incorporating the defect and placing the expected incisions within the relaxed skin tension lines where possible.    The area thus outlined was incised deep to adipose tissue with a #15 scalpel blade.  The skin margins were undermined to an appropriate distance in all directions utilizing iris scissors.
Perilesional Excision Additional Text (Leave Blank If You Do Not Want): The margin was drawn around the clinically apparent lesion. Incisions were then made along these lines to the appropriate tissue plane and the lesion was extirpated.

## 2017-01-20 ENCOUNTER — APPOINTMENT (RX ONLY)
Dept: URBAN - METROPOLITAN AREA CLINIC 23 | Facility: CLINIC | Age: 72
Setting detail: DERMATOLOGY
End: 2017-01-20

## 2017-01-20 DIAGNOSIS — Z48.01 ENCOUNTER FOR CHANGE OR REMOVAL OF SURGICAL WOUND DRESSING: ICD-10-CM

## 2017-01-20 PROCEDURE — ? SUTURE REMOVAL (GLOBAL PERIOD)

## 2017-01-20 ASSESSMENT — LOCATION ZONE DERM: LOCATION ZONE: FACE

## 2017-01-20 ASSESSMENT — LOCATION SIMPLE DESCRIPTION DERM: LOCATION SIMPLE: LEFT TEMPLE

## 2017-01-20 ASSESSMENT — LOCATION DETAILED DESCRIPTION DERM: LOCATION DETAILED: LEFT CENTRAL TEMPLE

## 2017-01-20 NOTE — PROCEDURE: SUTURE REMOVAL (GLOBAL PERIOD)
Add 27569 Cpt? (Important Note: In 2017 The Use Of 72378 Is Being Tracked By Cms To Determine Future Global Period Reimbursement For Global Periods): no
Detail Level: Simple

## 2017-02-15 ENCOUNTER — HOSPITAL ENCOUNTER (OUTPATIENT)
Dept: LAB | Age: 72
Discharge: HOME | End: 2017-02-15
Attending: INTERNAL MEDICINE
Payer: MEDICARE

## 2017-02-15 DIAGNOSIS — I50.22 SYSTOLIC CHF, CHRONIC (HCC): Chronic | ICD-10-CM

## 2017-02-15 LAB
ANION GAP BLD CALC-SCNC: 7 MMOL/L
BASOPHILS # BLD AUTO: 0 K/UL (ref 0–0.2)
BASOPHILS # BLD: 1 % (ref 0–2)
BUN SERPL-MCNC: 14 MG/DL (ref 8–23)
CALCIUM SERPL-MCNC: 10.2 MG/DL (ref 8.3–10.4)
CHLORIDE SERPL-SCNC: 103 MMOL/L (ref 98–107)
CO2 SERPL-SCNC: 28 MMOL/L (ref 23–32)
CREAT SERPL-MCNC: 0.9 MG/DL (ref 0.6–1)
DIFFERENTIAL METHOD BLD: NORMAL
EOSINOPHIL # BLD: 0.2 K/UL (ref 0–0.8)
EOSINOPHIL NFR BLD: 2 % (ref 0.5–7.8)
ERYTHROCYTE [DISTWIDTH] IN BLOOD BY AUTOMATED COUNT: 13.2 % (ref 11.9–14.6)
GLUCOSE SERPL-MCNC: 163 MG/DL (ref 65–100)
HCT VFR BLD AUTO: 43.1 % (ref 41.1–50.3)
HGB BLD-MCNC: 14.8 G/DL (ref 13.6–17.2)
LYMPHOCYTES # BLD AUTO: 29 % (ref 13–44)
LYMPHOCYTES # BLD: 2.1 K/UL (ref 0.5–4.6)
MCH RBC QN AUTO: 27.9 PG (ref 26.1–32.9)
MCHC RBC AUTO-ENTMCNC: 34.3 G/DL (ref 31.4–35)
MCV RBC AUTO: 81.2 FL (ref 79.6–97.8)
MONOCYTES # BLD: 0.7 K/UL (ref 0.1–1.3)
MONOCYTES NFR BLD AUTO: 10 % (ref 4–12)
NEUTS SEG # BLD: 4.2 K/UL (ref 1.7–8.2)
NEUTS SEG NFR BLD AUTO: 58 % (ref 43–78)
PLATELET # BLD AUTO: 164 K/UL (ref 150–450)
PMV BLD AUTO: 11.3 FL (ref 10.8–14.1)
POTASSIUM SERPL-SCNC: 4.3 MMOL/L (ref 3.5–5.1)
RBC # BLD AUTO: 5.31 M/UL (ref 4.23–5.67)
SODIUM SERPL-SCNC: 138 MMOL/L (ref 136–145)
WBC # BLD AUTO: 7.2 K/UL (ref 4.3–11.1)

## 2017-02-15 PROCEDURE — 36415 COLL VENOUS BLD VENIPUNCTURE: CPT | Performed by: INTERNAL MEDICINE

## 2017-02-15 PROCEDURE — 85025 COMPLETE CBC W/AUTO DIFF WBC: CPT | Performed by: INTERNAL MEDICINE

## 2017-02-15 PROCEDURE — 80048 BASIC METABOLIC PNL TOTAL CA: CPT | Performed by: INTERNAL MEDICINE

## 2017-02-24 NOTE — PROGRESS NOTES
Patient pre-assessment complete for [facility] poss with Dr Mali Ramirez scheduled for 17 at 11am, arrival time 9am. Patient verified using . Patient instructed to bring all home medications in labeled bottles on the day of procedure. NPO status reinforced. Patient informed to take a full dose aspirin 325mg  or 81 mg x 4 on the day of procedure. Patient instructed to HOLD glipizide. Instructed they can take all other medications excluding vitamins & supplements. Patient verbalizes understanding of all instructions & denies any questions at this time.

## 2017-02-27 ENCOUNTER — HOSPITAL ENCOUNTER (OUTPATIENT)
Dept: CARDIAC CATH/INVASIVE PROCEDURES | Age: 72
Discharge: HOME | End: 2017-02-27
Attending: INTERNAL MEDICINE | Admitting: INTERNAL MEDICINE
Payer: MEDICARE

## 2017-02-27 VITALS
BODY MASS INDEX: 33.32 KG/M2 | RESPIRATION RATE: 12 BRPM | TEMPERATURE: 97.9 F | OXYGEN SATURATION: 96 % | HEART RATE: 62 BPM | DIASTOLIC BLOOD PRESSURE: 74 MMHG | WEIGHT: 200 LBS | HEIGHT: 65 IN | SYSTOLIC BLOOD PRESSURE: 133 MMHG

## 2017-02-27 LAB
ATRIAL RATE: 78 BPM
CALCULATED P AXIS, ECG09: NORMAL DEGREES
CALCULATED R AXIS, ECG10: -69 DEGREES
CALCULATED T AXIS, ECG11: 102 DEGREES
DIAGNOSIS, 93000: NORMAL
DIASTOLIC BP, ECG02: NORMAL MMHG
GLUCOSE BLD STRIP.AUTO-MCNC: 168 MG/DL (ref 65–100)
P-R INTERVAL, ECG05: 200 MS
Q-T INTERVAL, ECG07: 430 MS
QRS DURATION, ECG06: 150 MS
QTC CALCULATION (BEZET), ECG08: 493 MS
SYSTOLIC BP, ECG01: NORMAL MMHG
VENTRICULAR RATE, ECG03: 79 BPM

## 2017-02-27 PROCEDURE — 74011636320 HC RX REV CODE- 636/320: Performed by: INTERNAL MEDICINE

## 2017-02-27 PROCEDURE — C1894 INTRO/SHEATH, NON-LASER: HCPCS

## 2017-02-27 PROCEDURE — 99153 MOD SED SAME PHYS/QHP EA: CPT

## 2017-02-27 PROCEDURE — 74011000250 HC RX REV CODE- 250: Performed by: INTERNAL MEDICINE

## 2017-02-27 PROCEDURE — C1769 GUIDE WIRE: HCPCS

## 2017-02-27 PROCEDURE — 74011250636 HC RX REV CODE- 250/636

## 2017-02-27 PROCEDURE — 93458 L HRT ARTERY/VENTRICLE ANGIO: CPT

## 2017-02-27 PROCEDURE — 77030015766

## 2017-02-27 PROCEDURE — 74011250636 HC RX REV CODE- 250/636: Performed by: INTERNAL MEDICINE

## 2017-02-27 PROCEDURE — 99152 MOD SED SAME PHYS/QHP 5/>YRS: CPT

## 2017-02-27 PROCEDURE — 82962 GLUCOSE BLOOD TEST: CPT

## 2017-02-27 PROCEDURE — 93005 ELECTROCARDIOGRAM TRACING: CPT | Performed by: INTERNAL MEDICINE

## 2017-02-27 PROCEDURE — 77030004534 HC CATH ANGI DX INFN CARD -A

## 2017-02-27 PROCEDURE — 77030029997 HC DEV COM RDL R BND TELE -B

## 2017-02-27 RX ORDER — FENTANYL CITRATE 50 UG/ML
25-75 INJECTION, SOLUTION INTRAMUSCULAR; INTRAVENOUS
Status: DISCONTINUED | OUTPATIENT
Start: 2017-02-27 | End: 2017-02-27 | Stop reason: HOSPADM

## 2017-02-27 RX ORDER — SILDENAFIL 25 MG/1
25 TABLET, FILM COATED ORAL AS NEEDED
COMMUNITY
End: 2017-05-09 | Stop reason: ALTCHOICE

## 2017-02-27 RX ORDER — SODIUM CHLORIDE 0.9 % (FLUSH) 0.9 %
5-10 SYRINGE (ML) INJECTION AS NEEDED
Status: CANCELLED | OUTPATIENT
Start: 2017-02-27

## 2017-02-27 RX ORDER — HEPARIN SODIUM 200 [USP'U]/100ML
3 INJECTION, SOLUTION INTRAVENOUS CONTINUOUS
Status: DISCONTINUED | OUTPATIENT
Start: 2017-02-27 | End: 2017-02-27 | Stop reason: HOSPADM

## 2017-02-27 RX ORDER — GUAIFENESIN 100 MG/5ML
81-324 LIQUID (ML) ORAL
Status: DISCONTINUED | OUTPATIENT
Start: 2017-02-27 | End: 2017-02-27 | Stop reason: HOSPADM

## 2017-02-27 RX ORDER — SODIUM CHLORIDE 9 MG/ML
75 INJECTION, SOLUTION INTRAVENOUS CONTINUOUS
Status: DISCONTINUED | OUTPATIENT
Start: 2017-02-27 | End: 2017-02-27 | Stop reason: HOSPADM

## 2017-02-27 RX ORDER — LIDOCAINE HYDROCHLORIDE 20 MG/ML
2-10 INJECTION, SOLUTION INFILTRATION; PERINEURAL
Status: DISCONTINUED | OUTPATIENT
Start: 2017-02-27 | End: 2017-02-27 | Stop reason: HOSPADM

## 2017-02-27 RX ORDER — SODIUM CHLORIDE 0.9 % (FLUSH) 0.9 %
5-10 SYRINGE (ML) INJECTION EVERY 8 HOURS
Status: CANCELLED | OUTPATIENT
Start: 2017-02-27

## 2017-02-27 RX ORDER — DIAZEPAM 5 MG/1
5 TABLET ORAL ONCE
Status: DISCONTINUED | OUTPATIENT
Start: 2017-02-27 | End: 2017-02-27 | Stop reason: HOSPADM

## 2017-02-27 RX ORDER — MIDAZOLAM HYDROCHLORIDE 1 MG/ML
.5-2 INJECTION, SOLUTION INTRAMUSCULAR; INTRAVENOUS
Status: DISCONTINUED | OUTPATIENT
Start: 2017-02-27 | End: 2017-02-27 | Stop reason: HOSPADM

## 2017-02-27 RX ORDER — SODIUM CHLORIDE 9 MG/ML
250 INJECTION, SOLUTION INTRAVENOUS CONTINUOUS
Status: CANCELLED | OUTPATIENT
Start: 2017-02-27 | End: 2017-02-27

## 2017-02-27 RX ADMIN — FENTANYL CITRATE 25 MCG: 50 INJECTION, SOLUTION INTRAMUSCULAR; INTRAVENOUS at 12:27

## 2017-02-27 RX ADMIN — MIDAZOLAM HYDROCHLORIDE 2 MG: 1 INJECTION, SOLUTION INTRAMUSCULAR; INTRAVENOUS at 12:23

## 2017-02-27 RX ADMIN — SODIUM CHLORIDE 75 ML/HR: 900 INJECTION, SOLUTION INTRAVENOUS at 09:15

## 2017-02-27 RX ADMIN — IOVERSOL 55 ML: 741 INJECTION INTRA-ARTERIAL; INTRAVENOUS at 12:47

## 2017-02-27 RX ADMIN — HEPARIN SODIUM 3 UNITS/HR: 200 INJECTION, SOLUTION INTRAVENOUS at 11:34

## 2017-02-27 RX ADMIN — LIDOCAINE HYDROCHLORIDE 50 MG: 20 INJECTION, SOLUTION INFILTRATION; PERINEURAL at 12:30

## 2017-02-27 RX ADMIN — MIDAZOLAM HYDROCHLORIDE 1 MG: 1 INJECTION, SOLUTION INTRAMUSCULAR; INTRAVENOUS at 12:26

## 2017-02-27 RX ADMIN — HEPARIN SODIUM 2 ML: 10000 INJECTION, SOLUTION INTRAVENOUS; SUBCUTANEOUS at 12:36

## 2017-02-27 RX ADMIN — FENTANYL CITRATE 50 MCG: 50 INJECTION, SOLUTION INTRAMUSCULAR; INTRAVENOUS at 12:23

## 2017-02-27 NOTE — DISCHARGE INSTRUCTIONS
HEART CATHETERIZATION/ANGIOGRAPHY DISCHARGE INSTRUCTIONS    1. Check puncture site frequently for swelling or bleeding. If there is any bleeding, lie down and apply pressure over the area with a clean towel or washcloth. Call 911. Notify your doctor for any redness, swelling, drainage, or oozing from the puncture site. Notify your doctor for any fever or chills. 2. If the extremity becomes cold, numb, or painful call [address] Cardiology at [phone].  3. Activity should be limited for the next 48 hours. Climb stairs as little as possible and avoid any stooping, bending, or strenuous activity for 48 hours. No heavy lifting (anything over 10 pounds) for 3 days. 4. You may resume your usual diet. Drink more fluids than usual.  5. Have a responsible person drive you home and stay with you for at least 24 hours after your heart catheterization/angiography. Do not drive for 24 hours. 6. You may remove bandage from your Right wrist in 24 hours. You may shower in 24 hours. No tub baths, hot tubs, or swimming for 1 week. Do not place any lotions, creams, powders, or ointments over puncture site for 1 week. You may place a clean band-aid over the puncture site each day for 5 days. Change daily. 7. Please continue your medications as prescribed by your physician. I have read the above instructions and have had the opportunity to ask questions.       Patient: ________________________   Date: 2/27/2017    Witness: _______________________   Date: 2/27/2017

## 2017-02-27 NOTE — ROUTINE PROCESS
TRANSFER - OUT REPORT:    [facility]  Dr. Jemma Tafoya  RRA access    Versed 3mg, fentanyl 75mcg  Diagnostic cath, medical management  R band placed @ [phone] with 8ml air    Verbal report given to Moshe Fernandez RN(name) on Wes Reddy  being transferred to cpru(unit) for routine progression of care       Report consisted of patients Situation, Background, Assessment and   Recommendations(SBAR). Information from the following report(s) Procedure Summary was reviewed with the receiving nurse. Lines:   Peripheral IV 02/27/17 Right Antecubital (Active)       Peripheral IV 02/27/17 Left Antecubital (Active)        Opportunity for questions and clarification was provided.       Patient transported with:   [place]

## 2017-02-27 NOTE — PROGRESS NOTES
Assisted OOB & ambulated to BR & on unit. Tolerated activity without difficulty.  Right radial without bleeding or hematoma

## 2017-02-27 NOTE — IP AVS SNAPSHOT
[address] 
[phone] Patient: Poncho Morales 
MRN: [id number] RCD:[id number]/[id number] Discharge Summary 2/27/2017 Poncho Morales  
 MRN[de-identified]  [id number] Admission Information Provider Pager Service Admission Date Expected D/C Date Julianne Sebastian MD  CARDIAC CATH LAB 2/27/2017 2/27/2017 Actual LOS Patient Class 0 days OUTPATIENT Follow-up Information Follow up With Details Comments Contact Info Julianne Sebastian MD  A follow-up appointment has been scheduled for you for March 29 at 8:45am in the [place] office. You have also been scheduled to have an echocardiogram on march 21 at 11:30am in the [place] office. [address] [facility] [address] 
[phone] Current Discharge Medication List  
  
CONTINUE these medications which have NOT CHANGED Dose & Instructions Dispensing Information Comments Morning Noon Evening Bedtime  
 doxazosin 8 mg tablet Commonly known as:  CARDURA Your next dose is: Today, Tomorrow Other:  _________ Dose:  8 mg Take 8 mg by mouth nightly. Indications: HYPERTENSION, SYMPTOMATIC BENIGN PROSTATIC HYPERPLASIA Refills:  0  
     
   
   
   
  
 doxycycline 50 mg capsule Commonly known as:  VIBRAMYCIN Your next dose is: Today, Tomorrow Other:  _________ Dose:  50 mg Take 50 mg by mouth as needed. Refills:  0  
     
   
   
   
  
 gemfibrozil 600 mg tablet Commonly known as:  LOPID Your next dose is: Today, Tomorrow Other:  _________ Dose:  600 mg Take 600 mg by mouth daily. Refills:  0  
     
   
   
   
  
 glipiZIDE 10 mg tablet Commonly known as:  Jay Jay Lokesh Your next dose is: Today, Tomorrow Other:  _________ Dose:  10 mg Take 10 mg by mouth two (2) times a day. Refills:  0 lisinopril 5 mg tablet Commonly known as:  Iveth Riley Your next dose is: Today, Tomorrow Other:  _________ Take  by mouth daily. Refills:  0  
     
   
   
   
  
 multivitamin tablet Commonly known as:  ONE A DAY Your next dose is: Today, Tomorrow Other:  _________ Dose:  1 Tab Take 1 Tab by mouth daily. Refills:  0  
     
   
   
   
  
 omeprazole 40 mg capsule Commonly known as:  PRILOSEC Your next dose is: Today, Tomorrow Other:  _________ Dose:  40 mg Take 40 mg by mouth daily as needed. Refills:  0 VIAGRA 25 mg tablet Generic drug:  sildenafil citrate Your next dose is: Today, Tomorrow Other:  _________ Dose:  25 mg Take 25 mg by mouth as needed. Refills:  0 ZyrTEC 10 mg tablet Generic drug:  cetirizine Your next dose is: Today, Tomorrow Other:  _________ Dose:  10 mg Take 10 mg by mouth every morning. Refills:  0 General Information Please provide this summary of care documentation to your next provider. Allergies No Known Allergies Current Immunizations  Never Reviewed No immunizations on file. Discharge Instructions Discharge Instructions HEART CATHETERIZATION/ANGIOGRAPHY DISCHARGE INSTRUCTIONS 1. Check puncture site frequently for swelling or bleeding. If there is any bleeding, lie down and apply pressure over the area with a clean towel or washcloth. Call 911. Notify your doctor for any redness, swelling, drainage, or oozing from the puncture site. Notify your doctor for any fever or chills. 2. If the extremity becomes cold, numb, or painful call [facility] Cardiology at [phone]. 
3. Activity should be limited for the next 48 hours.  Climb stairs as little as possible and avoid any stooping, bending, or strenuous activity for 48 hours. No heavy lifting (anything over 10 pounds) for 3 days. 4. You may resume your usual diet. Drink more fluids than usual. 
5. Have a responsible person drive you home and stay with you for at least 24 hours after your heart catheterization/angiography. Do not drive for 24 hours. 6. You may remove bandage from your Right wrist in 24 hours. You may shower in 24 hours. No tub baths, hot tubs, or swimming for 1 week. Do not place any lotions, creams, powders, or ointments over puncture site for 1 week. You may place a clean band-aid over the puncture site each day for 5 days. Change daily. 7. Please continue your medications as prescribed by your physician. I have read the above instructions and have had the opportunity to ask questions. Patient: ________________________   Date: 2/27/2017 Witness: _______________________   Date: 2/27/2017 Discharge Orders None  
  
` Patient Signature:  ____________________________________________________________ Date:  ____________________________________________________________  
  
 [facility] Provider Signature:  ____________________________________________________________ Date:  ____________________________________________________________

## 2017-02-27 NOTE — PROCEDURES
Brief Cardiac Procedure Note    Patient: Royal Cadena MRN: [id number]  SSN: [id number]    YOB: 1945  Age: 80 y.o. Sex: male      Date of Procedure: 2/27/2017     Pre-procedure Diagnosis: Congestive Heart Failure    Post-procedure Diagnosis: Normal LVEF    Procedure: Left Heart Catheterization    Brief Description of Procedure: See note    Performed By: Odalis Kenyon MD     Assistants: None    Anesthesia: Moderate Sedation    Estimated Blood Loss: Less than 10 mL      Specimens: None    Implants: None    Findings:   LV:  EF 55%  LM:  NML  LAD:  NML  LCx:  NML  RCA:  NML    Complications: None    Recommendations: Continue medical therapy.     Signed By: Odalis Kenyon MD     February 27, 2017

## 2017-02-27 NOTE — PROGRESS NOTES
Patient received to [address] [place] [address] room # 2  Ambulatory from [facility]. Patient scheduled for C today with Dr Jennyfer Martines. Procedure reviewed & questions answered, voiced good understanding consent obtained & placed on chart. All medications and medical history reviewed. Will prep patient per orders. Patient & family updated on plan of care.

## 2017-02-27 NOTE — PROCEDURES
[address] 45       Name:  Tim Zamorano   MR#:  [id number]   :  1945   Account #:  [de-identified]   Date of Adm:  2017       DATE OF PROCEDURE: 2017    PRIMARY CARDIOLOGIST: Lizett Mckee MD    PRIMARY CARE PHYSICIAN: Roberta Zhang MD    BRIEF HISTORY: Mr. Ashlie Koroma is a very pleasant 76-year-old   gentleman with a history of symptomatic bradycardia status post   pacemaker implantation. He was recently seen in the office due   to shortness of breath and echo showed reduction in EF compared   to 2 years ago. Given in the face of chronic RV pacing and   extensive cardiovascular risk, he is referred for ischemic   evaluation for newly diagnosed systolic heart failure. PROCEDURE: After informed consent, he was prepped and draped in   the usual sterile fashion. The right wrist was injected with   lidocaine. The right radial artery was accessed via   micropuncture technique, a 6-Mozambican sheath was advanced without   complications. A 5-Mozambican Tiger catheter was utilized for left   and right coronary injections, and a 5-Mozambican angled pigtail for   left ventriculography. Isovue contrast utilized. CONSCIOUS SEDATION: The patient received 3 mg of Versed and 75   mcg of fentanyl. He was monitored for 30 minutes under the   conscious sedation protocol. FINDINGS   1. Left ventricle: Normal left ventricular size, normal left   ventricular systolic function, ejection fraction 55%. There was   no significant mitral regurgitation. There was no aortic valve   gradient. Left end-diastolic pressure was measured at 14 mmHg. 2. Left main: Left main is large and trifurcates into LAD,   ramus, and circumflex vessels and appears angiographically   normal.   3. Left anterior descending coronary:  This is a large vessel,   gives rise to a moderate proximal vessel diagonal. The entire   LAD diagonal system appears angiographically normal.   4. Ramus intermedius: This is a moderate size  vessel, appears   angiographically normal.   5. Left circumflex coronary: This is a large codominant vessel,   gives rise to a small first obtuse marginal, a large second   obtuse marginal, and a small left posterior descending that   appears angiographically normal.   6. Right coronary: This is a moderate size codominant vessel,   gives rise to a small right posterior descending. The entire   system appears angiographically normal.     Success hemostasis with pneumatic radial band. CONCLUSIONS   1. Normalization of ejection fraction with EF of 55% and left   ventricular end-diastolic pressure of 14 mmHg. 2. Normal coronary arteries. RECOMMENDATIONS: Reassess EF in the office with echocardiogram   in 1 month time frame. If EF remains normal, we will continue   current medical therapy. If EF demonstrates any evidence of   reduction, would consider a Bi-V pacing upgrade due to chronic   RV pacing. Thank you for allowing me to assist in the care of this patient. If questions or concerns, please feel free to contact me.         MD Elena Duque / [email or web address]   D:  02/27/2017   12:56   T:  02/27/2017   13:12   Job #:  [id number]

## 2017-02-27 NOTE — PROGRESS NOTES
Discharge instructions given per orders, voiced good understanding of right radial care, medications & follow up care.  Denies any questions

## 2017-02-27 NOTE — PROGRESS NOTES
TRANSFER - IN REPORT:    Verbal report received from [place]) on Maame Stokes  being received from cath lab(unit) for routine progression of care      Report consisted of patients Situation, Background, Assessment and   Recommendations(SBAR). Information from the following report(s) Procedure Summary was reviewed with the receiving nurse. Opportunity for questions and clarification was provided. Assessment completed upon patients arrival to unit and care assumed.

## 2017-03-27 ENCOUNTER — APPOINTMENT (RX ONLY)
Dept: URBAN - METROPOLITAN AREA CLINIC 24 | Facility: CLINIC | Age: 72
Setting detail: DERMATOLOGY
End: 2017-03-27

## 2017-03-27 DIAGNOSIS — D18.0 HEMANGIOMA: ICD-10-CM

## 2017-03-27 DIAGNOSIS — Z87.2 PERSONAL HISTORY OF DISEASES OF THE SKIN AND SUBCUTANEOUS TISSUE: ICD-10-CM

## 2017-03-27 DIAGNOSIS — L82.0 INFLAMED SEBORRHEIC KERATOSIS: ICD-10-CM

## 2017-03-27 DIAGNOSIS — L73.8 OTHER SPECIFIED FOLLICULAR DISORDERS: ICD-10-CM

## 2017-03-27 PROBLEM — D18.01 HEMANGIOMA OF SKIN AND SUBCUTANEOUS TISSUE: Status: ACTIVE | Noted: 2017-03-27

## 2017-03-27 PROBLEM — L20.84 INTRINSIC (ALLERGIC) ECZEMA: Status: ACTIVE | Noted: 2017-03-27

## 2017-03-27 PROBLEM — L70.0 ACNE VULGARIS: Status: ACTIVE | Noted: 2017-03-27

## 2017-03-27 PROBLEM — J30.1 ALLERGIC RHINITIS DUE TO POLLEN: Status: ACTIVE | Noted: 2017-03-27

## 2017-03-27 PROCEDURE — ? COUNSELING

## 2017-03-27 PROCEDURE — 99213 OFFICE O/P EST LOW 20 MIN: CPT | Mod: 25

## 2017-03-27 PROCEDURE — ? LIQUID NITROGEN

## 2017-03-27 PROCEDURE — ? OTHER

## 2017-03-27 PROCEDURE — 17110 DESTRUCTION B9 LES UP TO 14: CPT

## 2017-03-27 ASSESSMENT — LOCATION SIMPLE DESCRIPTION DERM
LOCATION SIMPLE: LEFT BUTTOCK
LOCATION SIMPLE: ABDOMEN
LOCATION SIMPLE: CHEST
LOCATION SIMPLE: RIGHT SHOULDER

## 2017-03-27 ASSESSMENT — LOCATION ZONE DERM
LOCATION ZONE: ARM
LOCATION ZONE: TRUNK

## 2017-03-27 ASSESSMENT — LOCATION DETAILED DESCRIPTION DERM
LOCATION DETAILED: RIGHT POSTERIOR SHOULDER
LOCATION DETAILED: STERNUM
LOCATION DETAILED: LEFT BUTTOCK
LOCATION DETAILED: EPIGASTRIC SKIN

## 2017-03-27 NOTE — PROCEDURE: LIQUID NITROGEN
Medical Necessity Clause: Treatment was medically necessary because the spot was getting
Medical Necessity Information: It is in your best interest to select a reason for this procedure from the list below. All of these items fulfill various CMS LCD requirements except the new and changing color options.
Render Post-Care Instructions In Note?: no
Detail Level: Detailed
Consent: The patient's verbal consent was obtained including but not limited to risks of crusting, scabbing, blistering, scarring, darker or lighter pigmentary change, recurrence, incomplete removal and infection.
Number Of Freeze-Thaw Cycles: 1 freeze-thaw cycle
Post-Care Instructions: I reviewed with the patient in detail post-care instructions. Patient is to wear sunprotection, and avoid picking at any of the treated lesions. Pt may apply Vaseline to crusted or scabbing areas.

## 2017-03-27 NOTE — PROCEDURE: OTHER
Detail Level: Simple
Note Text (......Xxx Chief Complaint.): This diagnosis correlates with the
Other (Free Text): 2mm curette and loop tip cautery used to treat the area

## 2017-04-17 ENCOUNTER — ANESTHESIA EVENT (OUTPATIENT)
Dept: SURGERY | Age: 72
End: 2017-04-17
Payer: MEDICARE

## 2017-04-17 NOTE — PROGRESS NOTES
Patient pre-assessment complete for BiV Pacemaker upgrade with DR Yair Maurer scheduled for 17 at 12noon, arrival time 10:00am. Patient verified using . Patient instructed to bring all home medications in labeled bottles on the day of procedure. NPO status reinforced. Patient instructed to HOLD glipizide, glucotrol, lisinopril. Instructed they can take all other medications excluding vitamins & supplements. Patient verbalizes understanding of all instructions & denies any questions at this time.

## 2017-04-18 ENCOUNTER — ANESTHESIA (OUTPATIENT)
Dept: SURGERY | Age: 72
End: 2017-04-18
Payer: MEDICARE

## 2017-04-18 ENCOUNTER — APPOINTMENT (OUTPATIENT)
Dept: GENERAL RADIOLOGY | Age: 72
End: 2017-04-18
Attending: INTERNAL MEDICINE
Payer: MEDICARE

## 2017-04-18 ENCOUNTER — APPOINTMENT (OUTPATIENT)
Dept: CARDIAC CATH/INVASIVE PROCEDURES | Age: 72
End: 2017-04-18
Payer: MEDICARE

## 2017-04-18 ENCOUNTER — HOSPITAL ENCOUNTER (OUTPATIENT)
Age: 72
Setting detail: OBSERVATION
Discharge: HOME | End: 2017-04-19
Attending: INTERNAL MEDICINE | Admitting: INTERNAL MEDICINE
Payer: MEDICARE

## 2017-04-18 LAB
ALBUMIN SERPL BCP-MCNC: 3.9 G/DL (ref 3.2–4.6)
ALBUMIN/GLOB SERPL: 1.1 {RATIO} (ref 1.2–3.5)
ALP SERPL-CCNC: 70 U/L (ref 50–136)
ALT SERPL-CCNC: 31 U/L (ref 12–65)
ANION GAP BLD CALC-SCNC: 9 MMOL/L (ref 7–16)
AST SERPL W P-5'-P-CCNC: 102 U/L (ref 15–37)
ATRIAL RATE: 468 BPM
ATRIAL RATE: 60 BPM
BILIRUB SERPL-MCNC: 0.3 MG/DL (ref 0.2–1.1)
BUN SERPL-MCNC: 20 MG/DL (ref 8–23)
CALCIUM SERPL-MCNC: 10.3 MG/DL (ref 8.3–10.4)
CALCULATED P AXIS, ECG09: 51 DEGREES
CALCULATED R AXIS, ECG10: -70 DEGREES
CALCULATED R AXIS, ECG10: -77 DEGREES
CALCULATED T AXIS, ECG11: -48 DEGREES
CALCULATED T AXIS, ECG11: 88 DEGREES
CHLORIDE SERPL-SCNC: 106 MMOL/L (ref 98–107)
CO2 SERPL-SCNC: 24 MMOL/L (ref 21–32)
CREAT SERPL-MCNC: 0.95 MG/DL (ref 0.8–1.5)
DIAGNOSIS, 93000: NORMAL
DIAGNOSIS, 93000: NORMAL
ERYTHROCYTE [DISTWIDTH] IN BLOOD BY AUTOMATED COUNT: 13.2 % (ref 11.9–14.6)
GLOBULIN SER CALC-MCNC: 3.6 G/DL (ref 2.3–3.5)
GLUCOSE SERPL-MCNC: 167 MG/DL (ref 65–100)
HCT VFR BLD AUTO: 42.7 % (ref 41.1–50.3)
HGB BLD-MCNC: 14.5 G/DL (ref 13.6–17.2)
MAGNESIUM SERPL-MCNC: 2.1 MG/DL (ref 1.8–2.4)
MCH RBC QN AUTO: 27.7 PG (ref 26.1–32.9)
MCHC RBC AUTO-ENTMCNC: 34 G/DL (ref 31.4–35)
MCV RBC AUTO: 81.5 FL (ref 79.6–97.8)
P-R INTERVAL, ECG05: 200 MS
PLATELET # BLD AUTO: 174 K/UL (ref 150–450)
PMV BLD AUTO: 11.7 FL (ref 10.8–14.1)
POTASSIUM SERPL-SCNC: 5 MMOL/L (ref 3.5–5.1)
PROT SERPL-MCNC: 7.5 G/DL (ref 6.3–8.2)
Q-T INTERVAL, ECG07: 408 MS
Q-T INTERVAL, ECG07: 470 MS
QRS DURATION, ECG06: 120 MS
QRS DURATION, ECG06: 152 MS
QTC CALCULATION (BEZET), ECG08: 437 MS
QTC CALCULATION (BEZET), ECG08: 470 MS
RBC # BLD AUTO: 5.24 M/UL (ref 4.23–5.67)
SODIUM SERPL-SCNC: 139 MMOL/L (ref 136–145)
VENTRICULAR RATE, ECG03: 60 BPM
VENTRICULAR RATE, ECG03: 69 BPM
WBC # BLD AUTO: 6.8 K/UL (ref 4.3–11.1)

## 2017-04-18 PROCEDURE — 71010 XR CHEST PORT: CPT

## 2017-04-18 PROCEDURE — 74011250637 HC RX REV CODE- 250/637: Performed by: INTERNAL MEDICINE

## 2017-04-18 PROCEDURE — 77030013687 HC GD NDL BARD -B

## 2017-04-18 PROCEDURE — 74011000250 HC RX REV CODE- 250

## 2017-04-18 PROCEDURE — 80053 COMPREHEN METABOLIC PANEL: CPT | Performed by: INTERNAL MEDICINE

## 2017-04-18 PROCEDURE — C8924 2D TTE W OR W/O FOL W/CON,FU: HCPCS

## 2017-04-18 PROCEDURE — 77030028698 HC BLD TISS PLSM MEDT -D

## 2017-04-18 PROCEDURE — 77030002912 HC SUT ETHBND J&J -A

## 2017-04-18 PROCEDURE — 74011250636 HC RX REV CODE- 250/636: Performed by: ANESTHESIOLOGY

## 2017-04-18 PROCEDURE — C1892 INTRO/SHEATH,FIXED,PEEL-AWAY: HCPCS

## 2017-04-18 PROCEDURE — 93005 ELECTROCARDIOGRAM TRACING: CPT | Performed by: INTERNAL MEDICINE

## 2017-04-18 PROCEDURE — 74011250637 HC RX REV CODE- 250/637: Performed by: ANESTHESIOLOGY

## 2017-04-18 PROCEDURE — 74011250636 HC RX REV CODE- 250/636

## 2017-04-18 PROCEDURE — 76060000034 HC ANESTHESIA 1.5 TO 2 HR: Performed by: INTERNAL MEDICINE

## 2017-04-18 PROCEDURE — 33214 UPGRADE OF PACEMAKER SYSTEM: CPT

## 2017-04-18 PROCEDURE — 83735 ASSAY OF MAGNESIUM: CPT | Performed by: INTERNAL MEDICINE

## 2017-04-18 PROCEDURE — C1769 GUIDE WIRE: HCPCS

## 2017-04-18 PROCEDURE — 74011250636 HC RX REV CODE- 250/636: Performed by: INTERNAL MEDICINE

## 2017-04-18 PROCEDURE — 33225 L VENTRIC PACING LEAD ADD-ON: CPT

## 2017-04-18 PROCEDURE — 74011000250 HC RX REV CODE- 250: Performed by: INTERNAL MEDICINE

## 2017-04-18 PROCEDURE — 76937 US GUIDE VASCULAR ACCESS: CPT

## 2017-04-18 PROCEDURE — 85027 COMPLETE CBC AUTOMATED: CPT | Performed by: INTERNAL MEDICINE

## 2017-04-18 PROCEDURE — 74011000258 HC RX REV CODE- 258: Performed by: INTERNAL MEDICINE

## 2017-04-18 PROCEDURE — L3670 SO ACRO/CLAV CAN WEB PRE OTS: HCPCS

## 2017-04-18 PROCEDURE — 99218 HC RM OBSERVATION: CPT

## 2017-04-18 PROCEDURE — 77030003028 HC SUT VCRL J&J -A

## 2017-04-18 PROCEDURE — 77030031139 HC SUT VCRL2 J&J -A

## 2017-04-18 PROCEDURE — 77030008467 HC STPLR SKN COVD -B

## 2017-04-18 PROCEDURE — C2621 PMKR, OTHER THAN SING/DUAL: HCPCS

## 2017-04-18 PROCEDURE — 33208 INSRT HEART PM ATRIAL & VENT: CPT

## 2017-04-18 PROCEDURE — C1900 LEAD, CORONARY VENOUS: HCPCS

## 2017-04-18 PROCEDURE — 77030012935 HC DRSG AQUACEL BMS -B

## 2017-04-18 PROCEDURE — C1893 INTRO/SHEATH, FIXED,NON-PEEL: HCPCS

## 2017-04-18 RX ORDER — LIDOCAINE HYDROCHLORIDE 20 MG/ML
INJECTION, SOLUTION EPIDURAL; INFILTRATION; INTRACAUDAL; PERINEURAL AS NEEDED
Status: DISCONTINUED | OUTPATIENT
Start: 2017-04-18 | End: 2017-04-18 | Stop reason: HOSPADM

## 2017-04-18 RX ORDER — GLIPIZIDE 5 MG/1
10 TABLET ORAL 2 TIMES DAILY
Status: DISCONTINUED | OUTPATIENT
Start: 2017-04-18 | End: 2017-04-19 | Stop reason: HOSPADM

## 2017-04-18 RX ORDER — CEFAZOLIN SODIUM IN 0.9 % NACL 2 G/50 ML
2 INTRAVENOUS SOLUTION, PIGGYBACK (ML) INTRAVENOUS
Status: COMPLETED | OUTPATIENT
Start: 2017-04-18 | End: 2017-04-18

## 2017-04-18 RX ORDER — SODIUM CHLORIDE 0.9 % (FLUSH) 0.9 %
5-10 SYRINGE (ML) INJECTION EVERY 8 HOURS
Status: DISCONTINUED | OUTPATIENT
Start: 2017-04-18 | End: 2017-04-19 | Stop reason: HOSPADM

## 2017-04-18 RX ORDER — SODIUM CHLORIDE, SODIUM LACTATE, POTASSIUM CHLORIDE, CALCIUM CHLORIDE 600; 310; 30; 20 MG/100ML; MG/100ML; MG/100ML; MG/100ML
75 INJECTION, SOLUTION INTRAVENOUS CONTINUOUS
Status: DISCONTINUED | OUTPATIENT
Start: 2017-04-18 | End: 2017-04-18

## 2017-04-18 RX ORDER — PROPOFOL 10 MG/ML
INJECTION, EMULSION INTRAVENOUS AS NEEDED
Status: DISCONTINUED | OUTPATIENT
Start: 2017-04-18 | End: 2017-04-18 | Stop reason: HOSPADM

## 2017-04-18 RX ORDER — FAMOTIDINE 20 MG/1
20 TABLET, FILM COATED ORAL ONCE
Status: COMPLETED | OUTPATIENT
Start: 2017-04-18 | End: 2017-04-18

## 2017-04-18 RX ORDER — MIDAZOLAM HYDROCHLORIDE 1 MG/ML
2 INJECTION, SOLUTION INTRAMUSCULAR; INTRAVENOUS ONCE
Status: DISCONTINUED | OUTPATIENT
Start: 2017-04-18 | End: 2017-04-18

## 2017-04-18 RX ORDER — HYDROMORPHONE HYDROCHLORIDE 2 MG/ML
0.5 INJECTION, SOLUTION INTRAMUSCULAR; INTRAVENOUS; SUBCUTANEOUS
Status: DISCONTINUED | OUTPATIENT
Start: 2017-04-18 | End: 2017-04-19 | Stop reason: HOSPADM

## 2017-04-18 RX ORDER — SODIUM CHLORIDE 0.9 % (FLUSH) 0.9 %
5-10 SYRINGE (ML) INJECTION AS NEEDED
Status: DISCONTINUED | OUTPATIENT
Start: 2017-04-18 | End: 2017-04-19 | Stop reason: HOSPADM

## 2017-04-18 RX ORDER — OXYCODONE HYDROCHLORIDE 5 MG/1
5 TABLET ORAL
Status: DISCONTINUED | OUTPATIENT
Start: 2017-04-18 | End: 2017-04-19 | Stop reason: HOSPADM

## 2017-04-18 RX ORDER — LISINOPRIL 5 MG/1
5 TABLET ORAL DAILY
Status: DISCONTINUED | OUTPATIENT
Start: 2017-04-19 | End: 2017-04-19 | Stop reason: HOSPADM

## 2017-04-18 RX ORDER — METFORMIN HYDROCHLORIDE 500 MG/1
250 TABLET ORAL DAILY
Status: DISCONTINUED | OUTPATIENT
Start: 2017-04-19 | End: 2017-04-19 | Stop reason: HOSPADM

## 2017-04-18 RX ORDER — SODIUM CHLORIDE 9 MG/ML
75 INJECTION, SOLUTION INTRAVENOUS CONTINUOUS
Status: DISCONTINUED | OUTPATIENT
Start: 2017-04-18 | End: 2017-04-18

## 2017-04-18 RX ORDER — HYDROCODONE BITARTRATE AND ACETAMINOPHEN 5; 325 MG/1; MG/1
1 TABLET ORAL
Status: DISCONTINUED | OUTPATIENT
Start: 2017-04-18 | End: 2017-04-19 | Stop reason: HOSPADM

## 2017-04-18 RX ORDER — CEFAZOLIN SODIUM IN 0.9 % NACL 2 G/50 ML
2 INTRAVENOUS SOLUTION, PIGGYBACK (ML) INTRAVENOUS EVERY 8 HOURS
Status: COMPLETED | OUTPATIENT
Start: 2017-04-18 | End: 2017-04-19

## 2017-04-18 RX ORDER — LIDOCAINE HYDROCHLORIDE 10 MG/ML
0.1 INJECTION INFILTRATION; PERINEURAL AS NEEDED
Status: DISCONTINUED | OUTPATIENT
Start: 2017-04-18 | End: 2017-04-18

## 2017-04-18 RX ORDER — FENTANYL CITRATE 50 UG/ML
100 INJECTION, SOLUTION INTRAMUSCULAR; INTRAVENOUS ONCE
Status: DISCONTINUED | OUTPATIENT
Start: 2017-04-18 | End: 2017-04-18

## 2017-04-18 RX ORDER — PANTOPRAZOLE SODIUM 40 MG/1
40 TABLET, DELAYED RELEASE ORAL
Status: DISCONTINUED | OUTPATIENT
Start: 2017-04-19 | End: 2017-04-19 | Stop reason: HOSPADM

## 2017-04-18 RX ORDER — OXYCODONE HYDROCHLORIDE 5 MG/1
10 TABLET ORAL
Status: DISCONTINUED | OUTPATIENT
Start: 2017-04-18 | End: 2017-04-19 | Stop reason: HOSPADM

## 2017-04-18 RX ORDER — DOXAZOSIN 4 MG/1
8 TABLET ORAL
Status: DISCONTINUED | OUTPATIENT
Start: 2017-04-18 | End: 2017-04-19 | Stop reason: HOSPADM

## 2017-04-18 RX ORDER — GEMFIBROZIL 600 MG/1
600 TABLET, FILM COATED ORAL DAILY
Status: DISCONTINUED | OUTPATIENT
Start: 2017-04-19 | End: 2017-04-19 | Stop reason: HOSPADM

## 2017-04-18 RX ORDER — BUPIVACAINE HYDROCHLORIDE AND EPINEPHRINE 5; 5 MG/ML; UG/ML
60 INJECTION, SOLUTION EPIDURAL; INTRACAUDAL; PERINEURAL ONCE
Status: DISCONTINUED | OUTPATIENT
Start: 2017-04-18 | End: 2017-04-18

## 2017-04-18 RX ORDER — CEFAZOLIN SODIUM IN 0.9 % NACL 2 G/50 ML
2 INTRAVENOUS SOLUTION, PIGGYBACK (ML) INTRAVENOUS EVERY 8 HOURS
Status: DISCONTINUED | OUTPATIENT
Start: 2017-04-18 | End: 2017-04-18 | Stop reason: SDUPTHER

## 2017-04-18 RX ORDER — ACETAMINOPHEN 325 MG/1
650 TABLET ORAL
Status: DISCONTINUED | OUTPATIENT
Start: 2017-04-18 | End: 2017-04-19 | Stop reason: HOSPADM

## 2017-04-18 RX ORDER — PROPOFOL 10 MG/ML
INJECTION, EMULSION INTRAVENOUS
Status: DISCONTINUED | OUTPATIENT
Start: 2017-04-18 | End: 2017-04-18 | Stop reason: HOSPADM

## 2017-04-18 RX ADMIN — HYDROCODONE BITARTRATE AND ACETAMINOPHEN 1 TABLET: 5; 325 TABLET ORAL at 17:15

## 2017-04-18 RX ADMIN — CEFAZOLIN 2 G: 1 INJECTION, POWDER, FOR SOLUTION INTRAMUSCULAR; INTRAVENOUS; PARENTERAL at 21:32

## 2017-04-18 RX ADMIN — LIDOCAINE HYDROCHLORIDE 50 MG: 20 INJECTION, SOLUTION EPIDURAL; INFILTRATION; INTRACAUDAL; PERINEURAL at 13:04

## 2017-04-18 RX ADMIN — GLIPIZIDE 10 MG: 5 TABLET ORAL at 17:15

## 2017-04-18 RX ADMIN — Medication 10 ML: at 17:15

## 2017-04-18 RX ADMIN — SODIUM CHLORIDE: 900 INJECTION, SOLUTION INTRAVENOUS at 12:55

## 2017-04-18 RX ADMIN — Medication 5 ML: at 21:42

## 2017-04-18 RX ADMIN — PROPOFOL 20 MG: 10 INJECTION, EMULSION INTRAVENOUS at 13:19

## 2017-04-18 RX ADMIN — PROPOFOL 60 MG: 10 INJECTION, EMULSION INTRAVENOUS at 13:04

## 2017-04-18 RX ADMIN — PERFLUTREN 1 ML: 6.52 INJECTION, SUSPENSION INTRAVENOUS at 15:00

## 2017-04-18 RX ADMIN — PROPOFOL 140 MCG/KG/MIN: 10 INJECTION, EMULSION INTRAVENOUS at 13:04

## 2017-04-18 RX ADMIN — CEFAZOLIN 2 G: 1 INJECTION, POWDER, FOR SOLUTION INTRAMUSCULAR; INTRAVENOUS; PARENTERAL at 13:05

## 2017-04-18 RX ADMIN — FAMOTIDINE 20 MG: 20 TABLET ORAL at 10:31

## 2017-04-18 NOTE — IP AVS SNAPSHOT
Current Discharge Medication List  
  
START taking these medications Dose & Instructions Dispensing Information Comments Morning Noon Evening Bedtime HYDROcodone-acetaminophen 5-325 mg per tablet Commonly known as:  Stella Erazo Notes to Patient:  As needed every four (4) hours for pain Dose:  1 Tab Take 1 Tab by mouth every four (4) hours as needed. Max Daily Amount: 6 Tabs. Quantity:  15 Tab Refills:  0 CONTINUE these medications which have NOT CHANGED Dose & Instructions Dispensing Information Comments Morning Noon Evening Bedtime  
 doxazosin 8 mg tablet Commonly known as:  CARDURA Your next dose is: Tonight Dose:  8 mg Take 8 mg by mouth nightly. Indications: HYPERTENSION, SYMPTOMATIC BENIGN PROSTATIC HYPERPLASIA Refills:  0  
     
   
   
  
   
  
 doxycycline 50 mg capsule Commonly known as:  VIBRAMYCIN Notes to Patient:  As needed Dose:  50 mg Take 50 mg by mouth as needed. Refills:  0  
     
   
   
   
  
 gemfibrozil 600 mg tablet Commonly known as:  LOPID Your next dose is:  Thursday Dose:  600 mg Take 600 mg by mouth daily. Refills:  0  
     
   
   
   
  
 glipiZIDE 10 mg tablet Commonly known as:  Sandeep Lovelaura Dose:  10 mg Take 10 mg by mouth two (2) times a day. Refills:  0  
     
   
   
   
  
 lisinopril 5 mg tablet Commonly known as:  Dorette Carlos Your next dose is:  Thursday Take  by mouth daily. Refills:  0  
     
   
   
   
  
 metFORMIN  mg tablet Commonly known as:  GLUCOPHAGE XR Your next dose is:  Thursday Dose:  250 mg Take 250 mg by mouth daily. Refills:  0  
     
   
   
   
  
 multivitamin tablet Commonly known as:  ONE A DAY Your next dose is:  Thursday Dose:  1 Tab Take 1 Tab by mouth daily. Refills:  0  
     
   
   
   
  
 omeprazole 40 mg capsule Commonly known as:  PRILOSEC  
 Notes to Patient:  As needed Dose:  40 mg Take 40 mg by mouth daily as needed. Refills:  0 VIAGRA 25 mg tablet Generic drug:  sildenafil citrate Notes to Patient:  As needed Dose:  25 mg Take 25 mg by mouth as needed. Refills:  0 ZyrTEC 10 mg tablet Generic drug:  cetirizine Your next dose is:  Thursday Dose:  10 mg Take 10 mg by mouth every morning. Refills:  0 Where to Get Your Medications Information on where to get these meds will be given to you by the nurse or doctor. ! Ask your nurse or doctor about these medications HYDROcodone-acetaminophen 5-325 mg per tablet

## 2017-04-18 NOTE — PROGRESS NOTES
Report received from [facility] EP Lab RN. Procedural findings communicated. Intra procedural  medication administration reviewed. Progression of care discussed.      Patient received into [address] [facility] 6    Access site without bleeding or swelling yes    Dressing dry and intact yes    Patient instructed to limit movement to right upper extremity    Routine post procedural vital signs and site assessment initiated yes

## 2017-04-18 NOTE — PROGRESS NOTES
Dual skin assessment completed. l subclavian incision site cdi no bleeding/hematoma.  Heels and sacrum intact with no breakdown

## 2017-04-18 NOTE — PROGRESS NOTES
Verbal and bedside shift change report received from [facility].  Left subclavian site visualized at bedside

## 2017-04-18 NOTE — PROGRESS NOTES
Pt arrived, ambulated to room with no visible problems, planned BiV ICD upgrade for Dr Kassie Rodriguez. Consent signed, Procedure discussed with pt all questions answered voiced understanding. Medications and history discussed with pt.     Pt prepped per orders

## 2017-04-18 NOTE — ANESTHESIA PREPROCEDURE EVALUATION
Anesthetic History               Review of Systems / Medical History  Patient summary reviewed and pertinent labs reviewed    Pulmonary                   Neuro/Psych              Cardiovascular    Hypertension      CHF  Dysrhythmias   Pacemaker         GI/Hepatic/Renal     GERD           Endo/Other    Diabetes: type 2    Morbid obesity and arthritis     Other Findings              Physical Exam    Airway  Mallampati: II  TM Distance: > 6 cm  Neck ROM: normal range of motion   Mouth opening: Normal     Cardiovascular    Rhythm: regular  Rate: normal         Dental         Pulmonary          Rales:bibasilar       Abdominal         Other Findings            Anesthetic Plan    ASA: 3  Anesthesia type: total IV anesthesia            Anesthetic plan and risks discussed with: Patient

## 2017-04-18 NOTE — PROCEDURES
Pre-Procedure Diagnosis  1. Documented non-reversible symptomatic bradycardia due to second degree and/or third degree atrioventricular block. 2. Chronic systolic congestive heart failure. 35%  3. High degree of ventricular pacing. Procedure Performed  1. Insertion of [place] biventricular permanent pacemaker. 2. Insertion of left ventricular lead at time of new system Implantation. 3. Removal of dual chamber generator. Anesthesia: MAC     Estimated Blood Loss: Less than 10 mL     Specimens: * No specimens in log *     The procedure, indications, risks, benefits, and alternatives were discussed with the patient and family members, who desired to proceed after questions were answered and informed consent was documented. Procedure: After informed consent was obtained, the patient was brought to the Electrophysiology Laboratory in a fasting state and was prepped and draped in sterile fashion. Prophylactic antibiotic was administered 10 minutes prior to skin incision: refer to anesthesia procedural documentation for details. MAC was administered by the anesthesia team with continuous oxygen saturation measurement and blood pressure measurement. Local anesthetic (sensorcaine) was delivered to the left pectoral region and an incision was made parallel to the deltopectoral groove. Acess x 1 was obtained under ultrasound guidance using a modified Seldinger technique with placement of a short wire down into the RA and advanced below the diaphragm followed by placement of a peel-away sheath over the guidewire. A [place] LV delivery sheath was advanced over a [place] wire and used to cannulate the coronary sinus. A coronary sinus venogram was then performed using a balloon occluding catheter. The left ventricular lead was then advanced with into a posterolateral branch over a Whisper wire. Adequate thresholds were obtained with an adequate margin between phrenic stim and loss of capture.  The delivery sheaths were then slit with fluoroscopy demonstrating stable position. The lead was then sutured to the underlying pectoralis fascia using 2 non-absorbable sutures around the lead collar. The existing pacemaker pocket was then opened using electrocautery and blunt dissection. The old device was disconnected and existing leads tested via the pacing system analyzer (PSA) demonstrating stable sensing, impedance and pacing thresholds. The lead pins were then cleaned with antibiotic soaked gauze, dried gently, and attached to a new pacemaker generator. Pins were directly observed to pass the tip electrode, and the ring hex wrench screws were secured, and leads tug tested. The device and leads were gently positioned within the pocket after the pocket was irrigated copiously with a saline antimicrobial solution. The wound was closed with multiple layers of absorbable suture followed by skin closure with staples. Fluoroscopic images were interpreted by me, in multiple projections. Final device position was confirmed by stored fluoroscopic image from device pocket to lead tips in AP projection. The patient tolerated the procedure well and left the lab in good condition.     Lead Data:    Device and Lead Information  Pulse Generator Model #  Serial # Location Implant   [id number] [facility] [id number] Left Pectoral Implant   Lead Model Number  Serial Number Lead position Implant   RA Setrox S 53 BIO [id number] RA Appendage Chronic  09.16.2015   RV Setrox S 48 BIO [id number] RV Septum Chronic  09.16.2015   LV 1458Q/75 [facility] [id number] CS Implant     Lead Sensitivity and Threshold  Lead R or P sensitivity (mv) Threshold (V) Threshold PW (msec) Impedance (ohms) Final output Voltage (V) Final PW (msec)   RA 3.6 0.75 0.50 480 2.0 0.50   RV - 0.75 0.50 450 2.0 0.50   LV - 0.75 0.50 900 2.0 0.50   LV2 - 1.0 0.50 1050 2.0 0.50     Bradycardia Settings  Basil Mode LRL URL Pace AVD (ms) Sense AVD (ms) Rate Response Mode Switching Mode SW Rate DDD 60 120 200 150 On On 180     Contrast:  20 ml    Fluoro Time:  87.3 minutes    Complications: None    IMPRESSION: Successful upgrade of dual chamber PPM to [place] biventricular permanent pacemaker    Rafael Ojeda MD, MS  Clinical Cardiac Electrophysiology

## 2017-04-18 NOTE — PROGRESS NOTES
TRANSFER - OUT REPORT:    Verbal report given to Janell Gracia RN(name) on Manette Folds  being transferred to cath lab(unit) for routine progression of care       Report consisted of patients Situation, Background, Assessment and   Recommendations(SBAR). Information from the following report(s) Procedure Summary was reviewed with the receiving nurse. Lines:   Peripheral IV 04/18/17 Left Antecubital (Active)        Opportunity for questions and clarification was provided.

## 2017-04-18 NOTE — IP AVS SNAPSHOT
[facility] 
 
 
 [address] [place] [address] 
[phone] Patient: John Chinchilla 
MRN: [id number] DTU:6/07[birth year]289 You are allergic to the following No active allergies Recent Documentation Height Weight BMI Smoking Status 1.676 m 96.5 kg 34.35 kg/m2 Former Smoker Emergency Contacts Name Discharge Info Relation Home Work Mobile Aracely Arroyo  Friend [5]   [phone] About your hospitalization You were admitted on:  April 18, 2017 You last received care in the:  [facility] 3 TELEMETRY You were discharged on:  April 19, 2017 Unit phone number:  [phone] Why you were hospitalized Your primary diagnosis was:  Not on File Providers Seen During Your Hospitalizations Provider Role Specialty Primary office phone Lebron Carlisle MD Attending Provider Cardiology [phone] Your Primary Care Physician (PCP) Primary Care Physician Office Phone Office Fax Rosaura Heathfranca [phone] Follow-up Information Follow up With Details Comments Contact Info Teagan Cornelius MD   [address] [place] [address] Alysia Taylor 
[phone] Lebron Carlisle MD  May 2 @ 1:[address] [facility], [place] [address] 
[phone] Your Appointments Tuesday May 02, 2017  8:05 AM EDT Radiation Oncology Lab with [address] ([address]) [address] [facility] [address] [facility] [place] [address]  
[phone] Tuesday May 02, 2017  1:00 PM EDT  
OFFICE DEVICE CHECKS with [facility] Cardiology ([facility]) [address] 
[address] [facility] 81  
[phone] This upcoming device check will take place IN OUR OFFICE.   Please arrive 15 minutes early to review any necessary paperwork requirements. If you have any further questions or need to change this appointment, we are happy to help and can be reached at [phone]. Tuesday May 09, 2017 10:30 AM EDT  
[facility] OFFICE VISIT with Joes Ambrosio NP  
[address]) [address] [facility] [address] [place] [address]  
[phone] Current Discharge Medication List  
  
START taking these medications Dose & Instructions Dispensing Information Comments Morning Noon Evening Bedtime HYDROcodone-acetaminophen 5-325 mg per tablet Commonly known as:  Puja Michaud Notes to Patient:  As needed every four (4) hours for pain Dose:  1 Tab Take 1 Tab by mouth every four (4) hours as needed. Max Daily Amount: 6 Tabs. Quantity:  15 Tab Refills:  0 CONTINUE these medications which have NOT CHANGED Dose & Instructions Dispensing Information Comments Morning Noon Evening Bedtime  
 doxazosin 8 mg tablet Commonly known as:  CARDURA Your next dose is: Tonight Dose:  8 mg Take 8 mg by mouth nightly. Indications: HYPERTENSION, SYMPTOMATIC BENIGN PROSTATIC HYPERPLASIA Refills:  0  
     
   
   
  
   
  
 doxycycline 50 mg capsule Commonly known as:  VIBRAMYCIN Notes to Patient:  As needed Dose:  50 mg Take 50 mg by mouth as needed. Refills:  0  
     
   
   
   
  
 gemfibrozil 600 mg tablet Commonly known as:  LOPID Your next dose is:  Thursday Dose:  600 mg Take 600 mg by mouth daily. Refills:  0  
     
   
   
   
  
 glipiZIDE 10 mg tablet Commonly known as:  Merla Iesha Dose:  10 mg Take 10 mg by mouth two (2) times a day. Refills:  0  
     
   
   
   
  
 lisinopril 5 mg tablet Commonly known as:  Jakob Leaver Your next dose is:  Thursday Take  by mouth daily. Refills:  0 metFORMIN  mg tablet Commonly known as:  GLUCOPHAGE XR Your next dose is:  Thursday Dose:  250 mg Take 250 mg by mouth daily. Refills:  0  
     
   
   
   
  
 multivitamin tablet Commonly known as:  ONE A DAY Your next dose is:  Thursday Dose:  1 Tab Take 1 Tab by mouth daily. Refills:  0  
     
   
   
   
  
 omeprazole 40 mg capsule Commonly known as:  PRILOSEC Notes to Patient:  As needed Dose:  40 mg Take 40 mg by mouth daily as needed. Refills:  0 VIAGRA 25 mg tablet Generic drug:  sildenafil citrate Notes to Patient:  As needed Dose:  25 mg Take 25 mg by mouth as needed. Refills:  0 ZyrTEC 10 mg tablet Generic drug:  cetirizine Your next dose is:  Thursday Dose:  10 mg Take 10 mg by mouth every morning. Refills:  0 Where to Get Your Medications Information on where to get these meds will be given to you by the nurse or doctor. ! Ask your nurse or doctor about these medications HYDROcodone-acetaminophen 5-325 mg per tablet Discharge Instructions Heart Failure: Care Instructions Your Care Instructions Heart failure occurs when your heart does not pump as much blood as the body needs. Failure does not mean that the heart has stopped pumping but rather that it is not pumping as well as it should. Over time, this causes fluid buildup in your lungs and other parts of your body. Fluid buildup can cause shortness of breath, fatigue, swollen ankles, and other problems. By taking medicines regularly, reducing sodium (salt) in your diet, checking your weight every day, and making lifestyle changes, you can feel better and live longer. Follow-up care is a key part of your treatment and safety.  Be sure to make and go to all appointments, and call your doctor if you are having problems. It's also a good idea to know your test results and keep a list of the medicines you take. How can you care for yourself at home? Medicines · Be safe with medicines. Take your medicines exactly as prescribed. Call your doctor if you think you are having a problem with your medicine. · Do not take any vitamins, over-the-counter medicine, or herbal products without talking to your doctor first. Alverta Rolando not take ibuprofen (Advil or Motrin) and naproxen (Aleve) without talking to your doctor first. They could make your heart failure worse. · You may be taking some of the following medicine. ¨ Beta-blockers can slow heart rate, decrease blood pressure, and improve your condition. Taking a beta-blocker may lower your chance of needing to be hospitalized. ¨ Angiotensin-converting enzyme inhibitors (ACEIs) reduce the heart's workload, lower blood pressure, and reduce swelling. Taking an ACEI may lower your chance of needing to be hospitalized again. ¨ Angiotensin II receptor blockers (ARBs) work like ACEIs. Your doctor may prescribe them instead of ACEIs. ¨ Diuretics, also called water pills, reduce swelling. ¨ Potassium supplements replace this important mineral, which is sometimes lost with diuretics. ¨ Aspirin and other blood thinners prevent blood clots, which can cause a stroke or heart attack. You will get more details on the specific medicines your doctor prescribes. Diet · Your doctor may suggest that you limit sodium to 2,000 milligrams (mg) a day or less. That is less than 1 teaspoon of salt a day, including all the salt you eat in cooking or in packaged foods. People get most of their sodium from processed foods. Fast food and restaurant meals also tend to be very high in sodium. · Ask your doctor how much liquid you can drink each day. You may have to limit liquids. Weight · Weigh yourself without clothing at the same time each day.  Record your weight. Call your doctor if you gain more than 3 pounds in 2 to 3 days. A sudden weight gain may mean that your heart failure is getting worse. Activity level · Start light exercise (if your doctor says it is okay). Even if you can only do a small amount, exercise will help you get stronger, have more energy, and manage your weight and your stress. Walking is an easy way to get exercise. Start out by walking a little more than you did before. Bit by bit, increase the amount you walk. · When you exercise, watch for signs that your heart is working too hard. You are pushing yourself too hard if you cannot talk while you are exercising. If you become short of breath or dizzy or have chest pain, stop, sit down, and rest. 
· If you feel \"wiped out\" the day after you exercise, walk slower or for a shorter distance until you can work up to a better pace. · Get enough rest at night. Sleeping with 1 or 2 pillows under your upper body and head may help you breathe easier. Lifestyle changes · Do not smoke. Smoking can make a heart condition worse. If you need help quitting, talk to your doctor about stop-smoking programs and medicines. These can increase your chances of quitting for good. Quitting smoking may be the most important step you can take to protect your heart. · Limit alcohol to 2 drinks a day for men and 1 drink a day for women. Too much alcohol can cause health problems. · Avoid getting sick from colds and the flu. Get a pneumococcal vaccine shot. If you have had one before, ask your doctor whether you need another dose. Get a flu shot each year. If you must be around people with colds or the flu, wash your hands often. When should you call for help? Call 911 if you have symptoms of sudden heart failure such as: 
· You have severe trouble breathing. · You cough up pink, foamy mucus. · You have a new irregular or rapid heartbeat. Call your doctor now or seek immediate medical care if: · You have new or increased shortness of breath. · You are dizzy or lightheaded, or you feel like you may faint. · You have sudden weight gain, such as 3 pounds or more in 2 to 3 days. · You have increased swelling in your legs, ankles, or feet. · You are suddenly so tired or weak that you cannot do your usual activities. Watch closely for changes in your health, and be sure to contact your doctor if: 
· You develop new symptoms. Where can you learn more? Go to http://audrey-lavinia.info/. Enter F959 in the search box to learn more about \"Heart Failure: Care Instructions. \" Current as of: January 27, 2016 Content Version: 11.2 © 3381-9576 [employer]. Care instructions adapted under license by "[employer] (which disclaims liability or warranty for this information). If you have questions about a medical condition or this instruction, always ask your healthcare professional. [place] [address] any warranty or liability for your use of this information. Discharge Orders None ACO Transitions of Care Introducing [employer] [address] Tana Frank offers a voluntary care coordination program to provide high quality service and care to [facility] fee-for-service beneficiaries. Nelda Cuevas was designed to help you enhance your health and well-being through the following services: ? Transitions of Care  support for individuals who are transitioning from one care setting to another (example: Hospital to home). ? Chronic and Complex Care Coordination  support for individuals and caregivers of those with serious or chronic illnesses or with more than one chronic (ongoing) condition and those who take a number of different medications.   
 
 
If you meet specific medical criteria, a [address] [place] [address] Rd may call you directly to coordinate your care with your primary care physician and your other care providers. For questions about the [facility] programs, please, contact your physicians office. For general questions or additional information about Accountable Care Organizations: 
Please visit www.medicare.gov/acos. html or call 1-800-MEDICARE (8-184.415.2616) TTY users should call 5-710.275.7025. [employer] Announcement We are excited to announce that we are making your provider's discharge notes available to you in [employer]. You will see these notes when they are completed and signed by the physician that discharged you from your recent hospital stay. If you have any questions or concerns about any information you see in [employer], please call the Health Information Department where you were seen or reach out to your Primary Care Provider for more information about your plan of care. Introducing [facility]! Dear Brody Russell: 
Thank you for requesting a [employer] account. Our records indicate that you already have an active [employer] account. You can access your account anytime at [email or web address] Did you know that you can access your hospital and ER discharge instructions at any time in [employer]? You can also review all of your test results from your hospital stay or ER visit. Additional Information If you have questions, please visit the Frequently Asked Questions section of the [employer] website at [email or web address] Remember, [employer] is NOT to be used for urgent needs. For medical emergencies, dial 911. Now available from your iPhone and Android! General Information Please provide this summary of care documentation to your next provider. Patient Signature:  ____________________________________________________________ Date:  ____________________________________________________________  
  
Tom Search  Provider Signature: ____________________________________________________________ Date:  ____________________________________________________________

## 2017-04-19 VITALS
RESPIRATION RATE: 18 BRPM | HEIGHT: 66 IN | TEMPERATURE: 97.8 F | BODY MASS INDEX: 34.2 KG/M2 | WEIGHT: 212.8 LBS | SYSTOLIC BLOOD PRESSURE: 130 MMHG | DIASTOLIC BLOOD PRESSURE: 74 MMHG | OXYGEN SATURATION: 95 % | HEART RATE: 63 BPM

## 2017-04-19 PROCEDURE — 74011250637 HC RX REV CODE- 250/637: Performed by: INTERNAL MEDICINE

## 2017-04-19 PROCEDURE — 74011250636 HC RX REV CODE- 250/636: Performed by: INTERNAL MEDICINE

## 2017-04-19 PROCEDURE — 99218 HC RM OBSERVATION: CPT

## 2017-04-19 RX ORDER — HYDROCODONE BITARTRATE AND ACETAMINOPHEN 5; 325 MG/1; MG/1
1 TABLET ORAL
Qty: 15 TAB | Refills: 0 | Status: SHIPPED | OUTPATIENT
Start: 2017-04-19 | End: 2018-04-17

## 2017-04-19 RX ADMIN — Medication 10 ML: at 05:23

## 2017-04-19 RX ADMIN — HYDROCODONE BITARTRATE AND ACETAMINOPHEN 1 TABLET: 5; 325 TABLET ORAL at 05:26

## 2017-04-19 RX ADMIN — CEFAZOLIN 2 G: 1 INJECTION, POWDER, FOR SOLUTION INTRAMUSCULAR; INTRAVENOUS; PARENTERAL at 05:23

## 2017-04-19 NOTE — PROGRESS NOTES
Problem: Pacer/ICD: Post-Procedure  Goal: Respiratory  Outcome: Resolved/Met Date Met:  04/19/17  O2 sats >92  Goal: Psychosocial  Outcome: Resolved/Met Date Met:  04/19/17  Family present in room  Goal: *Hemodynamically stable  Outcome: Resolved/Met Date Met:  04/19/17  VSS  Goal: *Absence of signs and symptoms of infection or wound complication  Outcome: Resolved/Met Date Met:  04/19/17  Wound clean and dry; aquacel dressing present

## 2017-04-19 NOTE — DISCHARGE INSTRUCTIONS
Heart Failure: Care Instructions  Your Care Instructions    Heart failure occurs when your heart does not pump as much blood as the body needs. Failure does not mean that the heart has stopped pumping but rather that it is not pumping as well as it should. Over time, this causes fluid buildup in your lungs and other parts of your body. Fluid buildup can cause shortness of breath, fatigue, swollen ankles, and other problems. By taking medicines regularly, reducing sodium (salt) in your diet, checking your weight every day, and making lifestyle changes, you can feel better and live longer. Follow-up care is a key part of your treatment and safety. Be sure to make and go to all appointments, and call your doctor if you are having problems. It's also a good idea to know your test results and keep a list of the medicines you take. How can you care for yourself at home? Medicines  · Be safe with medicines. Take your medicines exactly as prescribed. Call your doctor if you think you are having a problem with your medicine. · Do not take any vitamins, over-the-counter medicine, or herbal products without talking to your doctor first. Skeet Lute not take ibuprofen (Advil or Motrin) and naproxen (Aleve) without talking to your doctor first. They could make your heart failure worse. · You may be taking some of the following medicine. ¨ Beta-blockers can slow heart rate, decrease blood pressure, and improve your condition. Taking a beta-blocker may lower your chance of needing to be hospitalized. ¨ Angiotensin-converting enzyme inhibitors (ACEIs) reduce the heart's workload, lower blood pressure, and reduce swelling. Taking an ACEI may lower your chance of needing to be hospitalized again. ¨ Angiotensin II receptor blockers (ARBs) work like ACEIs. Your doctor may prescribe them instead of ACEIs. ¨ Diuretics, also called water pills, reduce swelling.   ¨ Potassium supplements replace this important mineral, which is sometimes lost with diuretics. ¨ Aspirin and other blood thinners prevent blood clots, which can cause a stroke or heart attack. You will get more details on the specific medicines your doctor prescribes. Diet  · Your doctor may suggest that you limit sodium to 2,000 milligrams (mg) a day or less. That is less than 1 teaspoon of salt a day, including all the salt you eat in cooking or in packaged foods. People get most of their sodium from processed foods. Fast food and restaurant meals also tend to be very high in sodium. · Ask your doctor how much liquid you can drink each day. You may have to limit liquids. Weight  · Weigh yourself without clothing at the same time each day. Record your weight. Call your doctor if you gain more than 3 pounds in 2 to 3 days. A sudden weight gain may mean that your heart failure is getting worse. Activity level  · Start light exercise (if your doctor says it is okay). Even if you can only do a small amount, exercise will help you get stronger, have more energy, and manage your weight and your stress. Walking is an easy way to get exercise. Start out by walking a little more than you did before. Bit by bit, increase the amount you walk. · When you exercise, watch for signs that your heart is working too hard. You are pushing yourself too hard if you cannot talk while you are exercising. If you become short of breath or dizzy or have chest pain, stop, sit down, and rest.  · If you feel \"wiped out\" the day after you exercise, walk slower or for a shorter distance until you can work up to a better pace. · Get enough rest at night. Sleeping with 1 or 2 pillows under your upper body and head may help you breathe easier. Lifestyle changes  · Do not smoke. Smoking can make a heart condition worse. If you need help quitting, talk to your doctor about stop-smoking programs and medicines. These can increase your chances of quitting for good.  Quitting smoking may be the most important step you can take to protect your heart. · Limit alcohol to 2 drinks a day for men and 1 drink a day for women. Too much alcohol can cause health problems. · Avoid getting sick from colds and the flu. Get a pneumococcal vaccine shot. If you have had one before, ask your doctor whether you need another dose. Get a flu shot each year. If you must be around people with colds or the flu, wash your hands often. When should you call for help? Call 911 if you have symptoms of sudden heart failure such as:  · You have severe trouble breathing. · You cough up pink, foamy mucus. · You have a new irregular or rapid heartbeat. Call your doctor now or seek immediate medical care if:  · You have new or increased shortness of breath. · You are dizzy or lightheaded, or you feel like you may faint. · You have sudden weight gain, such as 3 pounds or more in 2 to 3 days. · You have increased swelling in your legs, ankles, or feet. · You are suddenly so tired or weak that you cannot do your usual activities. Watch closely for changes in your health, and be sure to contact your doctor if:  · You develop new symptoms. Where can you learn more? Go to http://audrey-lavinia.info/. Enter I216 in the search box to learn more about \"Heart Failure: Care Instructions. \"  Current as of: January 27, 2016  Content Version: 11.2  © 7577-8029 [employer]. Care instructions adapted under license by [employer] (which disclaims liability or warranty for this information). If you have questions about a medical condition or this instruction, always ask your healthcare professional. [place] [address] any warranty or liability for your use of this information.

## 2017-04-19 NOTE — PROGRESS NOTES
[facility] PROGRESS NOTE           4/19/2017 7:14 AM    Admit Date: 4/18/2017      Subjective:   Doing well, mild soreness at PM site. ROS:  Cardiovascular:  As noted above    Objective:      Vitals:    04/18/17 1618 04/18/17 2211 04/19/17 0146 04/19/17 0608   BP: 151/89 139/70 130/73 142/84   Pulse: 60 69 67 62   Resp: 17 18 18 18   Temp: 97.9 °F (36.6 °C) 97.8 °F (36.6 °C) 97.9 °F (36.6 °C) 98.3 °F (36.8 °C)   SpO2: 99% 97% 99% 94%   Weight:    96.5 kg (212 lb 12.8 oz)   Height:           Physical Exam:  General-No Acute Distress  Neck- supple, no JVD  CV- regular rate and rhythm no MRG  Lung- clear bilaterally  Abd- soft, nontender, nondistended  Ext- no edema bilaterally. Skin- warm and dry    Data Review:   Recent Labs      04/18/17   1020   NA  139   K  5.0   MG  2.1   BUN  20   CREA  0.95   GLU  167*   WBC  6.8   HGB  14.5   HCT  42.7   PLT  174       Assessment/Plan:     Active Problems:  Nonreversible symptomatic bradycardia--s/p placement of dual chamber --interrogation of device with normal operation.          Paul Mcdonough NP  4/19/2017 7:14 AM

## 2017-04-19 NOTE — ANESTHESIA POSTPROCEDURE EVALUATION
Post-Anesthesia Evaluation and Assessment    Patient: Bridget Marin MRN: [id number]  SSN: [id number]    YOB: 1945  Age: 80 y.o. Sex: male       Cardiovascular Function/Vital Signs  Visit Vitals    /74 (BP 1 Location: Right arm, BP Patient Position: At rest)    Pulse 63    Temp 36.6 °C (97.8 °F)    Resp 18    Ht 5' 6\" (1.676 m)    Wt 96.5 kg (212 lb 12.8 oz)    SpO2 95%    BMI 34.35 kg/m2       Patient is status post total IV anesthesia anesthesia for Procedure(s):  BIV PACEMAKER UPGRADE. Nausea/Vomiting: None    Postoperative hydration reviewed and adequate. Pain:  Pain Scale 1: Numeric (0 - 10) (04/19/17 0700)  Pain Intensity 1: 0 (04/19/17 0700)   Managed    Neurological Status: At baseline    Mental Status and Level of Consciousness: Arousable    Pulmonary Status:   O2 Device: Room air (04/19/17 0811)   Adequate oxygenation and airway patent    Complications related to anesthesia: None    Post-anesthesia assessment completed.  No concerns    Signed By: Enolia Moritz, MD     April 19, 2017

## 2017-04-19 NOTE — DISCHARGE SUMMARY
Physician Discharge Summary     Patient ID:  Dylon Bland  [id number]  78 y.o.  1945    Admit date: 4/18/2017    Discharge date and time:4/19/17    Admitting Physician: Juan Francisco Bains MD     Primary Cardiologist:Dr. Stuart Man MD    Discharge Physician: Antonio David NP    Admission Diagnoses: Congestive heart failure, unspecified congestive heart failure chronicity, unspecified congestive heart failure type [facility]) [I50.9]    Discharge Diagnoses:   Patient Active Problem List    Diagnosis Date Noted    Systolic CHF, chronic ([facility] 02/15/2017    Shortness of breath 08/30/2016    Chest discomfort 08/30/2016    Complete heart block ([facility] 05/02/2016    Cardiac pacemaker 01/18/2016    Symptomatic bradycardia 09/16/2015    Hypertension 09/16/2015    Type II diabetes mellitus ([facility] 09/16/2015    Dyslipidemia 09/16/2015    Malignant neoplasm of prostate ([facility] 05/15/2015    Elevated prostate specific antigen (PSA) 03/14/2014    Hypertrophy of prostate without urinary obstruction and other lower urinary tract symptoms (LUTS) 03/14/2014    Impotence of organic origin 03/14/2014           Hospital Course: Dylon Bland was admitted to the hospital on 4/18/2017 for elective placement of dual chamber [place] pacemaker. He was monitored over night on telemetry floor. Device was interrogated revealing normal operation. Patient has been instructed to keep Left arm below shoulder height for the next 4 weeks. Avoid reaching, pulling, pushing with affected arm. Call for any problems with pacemaker site including any suspicious drainage, fever of unknown origin for the next month. Keep site clean and dry applying no powders or lotions to site. No driving until seen in follow up.  Keep dressing intact until seen in clinic by Dr. Yair Maurer                    Discharge Exam:     Visit Vitals    /84 (BP 1 Location: Right arm, BP Patient Position: Head of bed elevated (Comment degrees))  Comment (BP Patient Position): 30 degrees    Pulse 62    Temp 98.3 °F (36.8 °C)    Resp 18    Ht 5' 6\" (1.676 m)    Wt 96.5 kg (212 lb 12.8 oz)    SpO2 94%    BMI 34.35 kg/m2     General Appearance:  Well developed, well nourished,alert and oriented x 3, and individual in no acute distress. Ears/Nose/Mouth/Throat:   Hearing grossly normal.         Neck: Supple. Chest:   Lungs clear to auscultation bilaterally. Cardiovascular:  Regular rate and rhythm, S1, S2 normal, no murmur. Abdomen:   Soft, non-tender, bowel sounds are active. Extremities: No edema bilaterally. Skin: Warm and dry. Final Laboratory Data:  Recent Results (from the past 24 hour(s))   EKG, 12 LEAD, INITIAL    Collection Time: 04/18/17 10:07 AM   Result Value Ref Range    Ventricular Rate 69 BPM    Atrial Rate 468 BPM    QRS Duration 120 ms    Q-T Interval 408 ms    QTC Calculation (Bezet) 437 ms    Calculated R Axis -70 degrees    Calculated T Axis 88 degrees    Diagnosis       AV sequential or dual chamber electronic pacemaker  When compared with ECG of 27-FEB-2017 09:02,  Vent.  rate has decreased BY  10 BPM  Confirmed by CHRISTOPHE BLANDON (), [facility] ([id number]) on 4/18/2017 12:17:12 PM     CBC W/O DIFF    Collection Time: 04/18/17 10:20 AM   Result Value Ref Range    WBC 6.8 4.3 - 11.1 K/uL    RBC 5.24 4.23 - 5.67 M/uL    HGB 14.5 13.6 - 17.2 g/dL    HCT 42.7 41.1 - 50.3 %    MCV 81.5 79.6 - 97.8 FL    MCH 27.7 26.1 - 32.9 PG    MCHC 34.0 31.4 - 35.0 g/dL    RDW 13.2 11.9 - 14.6 %    PLATELET 479 153 - 405 K/uL    MPV 11.7 10.8 - 98.6 FL   METABOLIC PANEL, COMPREHENSIVE    Collection Time: 04/18/17 10:20 AM   Result Value Ref Range    Sodium 139 136 - 145 mmol/L    Potassium 5.0 3.5 - 5.1 mmol/L    Chloride 106 98 - 107 mmol/L    CO2 24 21 - 32 mmol/L    Anion gap 9 7 - 16 mmol/L    Glucose 167 (H) 65 - 100 mg/dL    BUN 20 8 - 23 MG/DL    Creatinine 0.95 0.8 - 1.5 MG/DL    GFR est AA >60 >60 ml/min/1.73m2    GFR est non-AA >60 >60 ml/min/1.73m2    Calcium 10.3 8.3 - 10.4 MG/DL    Bilirubin, total 0.3 0.2 - 1.1 MG/DL    ALT (SGPT) 31 12 - 65 U/L    AST (SGOT) 102 (H) 15 - 37 U/L    Alk. phosphatase 70 50 - 136 U/L    Protein, total 7.5 6.3 - 8.2 g/dL    Albumin 3.9 3.2 - 4.6 g/dL    Globulin 3.6 (H) 2.3 - 3.5 g/dL    A-G Ratio 1.1 (L) 1.2 - 3.5     MAGNESIUM    Collection Time: 04/18/17 10:20 AM   Result Value Ref Range    Magnesium 2.1 1.8 - 2.4 mg/dL   EKG, 12 LEAD, INITIAL    Collection Time: 04/18/17  2:59 PM   Result Value Ref Range    Ventricular Rate 60 BPM    Atrial Rate 60 BPM    P-R Interval 200 ms    QRS Duration 152 ms    Q-T Interval 470 ms    QTC Calculation (Bezet) 470 ms    Calculated P Axis 51 degrees    Calculated R Axis -77 degrees    Calculated T Axis -48 degrees    Diagnosis       AV sequential or dual chamber electronic pacemaker  When compared with ECG of 18-APR-2017 10:07,  Vent. rate has decreased BY   9 BPM  Confirmed by [facility]  MD (), Camilo Larsen ([id number]) on 4/18/2017 3:28:32 PM         Disposition: home    Patient Instructions:   Current Discharge Medication List      START taking these medications    Details   HYDROcodone-acetaminophen (NORCO) 5-325 mg per tablet Take 1 Tab by mouth every four (4) hours as needed. Max Daily Amount: 6 Tabs. Qty: 15 Tab, Refills: 0         CONTINUE these medications which have NOT CHANGED    Details   metFORMIN ER (GLUCOPHAGE XR) 500 mg tablet Take 250 mg by mouth daily. glipiZIDE (GLUCOTROL) 10 mg tablet Take 10 mg by mouth two (2) times a day. omeprazole (PRILOSEC) 40 mg capsule Take 40 mg by mouth daily as needed. lisinopril (PRINIVIL, ZESTRIL) 5 mg tablet Take  by mouth daily. gemfibrozil (LOPID) 600 mg tablet Take 600 mg by mouth daily. cetirizine (ZYRTEC) 10 mg tablet Take 10 mg by mouth every morning. doxazosin (CARDURA) 8 mg tablet Take 8 mg by mouth nightly.  Indications: HYPERTENSION, SYMPTOMATIC BENIGN PROSTATIC HYPERPLASIA      multivitamin (ONE A DAY) tablet Take 1 Tab by mouth daily. sildenafil citrate (VIAGRA) 25 mg tablet Take 25 mg by mouth as needed. doxycycline (VIBRAMYCIN) 50 mg capsule Take 50 mg by mouth as needed. Referenced discharge instructions provided by nursing for diet and activity. Follow-up:  Primary Cardiologist:Dr. Ryan Vicente in 2 weeks in device clinic  PCP: Syeda Almonte MD) in about 4 weeks.     Signed:  Josiah Nunez NP  4/19/2017  7:17 AM

## 2017-05-02 ENCOUNTER — APPOINTMENT (OUTPATIENT)
Dept: RADIATION ONCOLOGY | Age: 72
End: 2017-05-02
Payer: MEDICARE

## 2017-05-02 ENCOUNTER — HOSPITAL ENCOUNTER (OUTPATIENT)
Dept: LAB | Age: 72
Discharge: HOME | End: 2017-05-02
Payer: MEDICARE

## 2017-05-02 DIAGNOSIS — C61 PROSTATE CANCER (HCC): ICD-10-CM

## 2017-05-02 DIAGNOSIS — C61 PROSTATE CANCER (HCC): Primary | ICD-10-CM

## 2017-05-02 LAB — PSA SERPL-MCNC: 0.7 NG/ML

## 2017-05-02 PROCEDURE — 36415 COLL VENOUS BLD VENIPUNCTURE: CPT | Performed by: NURSE PRACTITIONER

## 2017-05-02 PROCEDURE — 84153 ASSAY OF PSA TOTAL: CPT | Performed by: NURSE PRACTITIONER

## 2017-05-02 PROCEDURE — 84403 ASSAY OF TOTAL TESTOSTERONE: CPT | Performed by: NURSE PRACTITIONER

## 2017-05-03 LAB
COMMENT, TESC2: NORMAL
TESTOST SERPL-MCNC: 426 NG/DL (ref 348–1197)

## 2017-05-09 ENCOUNTER — HOSPITAL ENCOUNTER (OUTPATIENT)
Dept: RADIATION ONCOLOGY | Age: 72
Discharge: HOME | End: 2017-05-09
Payer: MEDICARE

## 2017-05-09 VITALS
TEMPERATURE: 97.6 F | WEIGHT: 216.7 LBS | OXYGEN SATURATION: 96 % | BODY MASS INDEX: 34.98 KG/M2 | DIASTOLIC BLOOD PRESSURE: 84 MMHG | SYSTOLIC BLOOD PRESSURE: 134 MMHG | HEART RATE: 67 BPM

## 2017-05-09 DIAGNOSIS — C61 PROSTATE CANCER (HCC): Primary | ICD-10-CM

## 2017-05-09 PROCEDURE — 99211 OFF/OP EST MAY X REQ PHY/QHP: CPT

## 2017-05-09 NOTE — NURSE NAVIGATOR
Pt here for 6 month f/u for Prostate cancer. Aua/Epic symptom  completed. Aua score 13. S/p RT. Labs 5-2-17. F/u with Dr. Fernando Gates 8-25-17.     Keiko Moe RN

## 2017-05-09 NOTE — PROGRESS NOTES
Patient: Sadia Del Toro MRN: [id number]  SSN: [id number]    YOB: 1945  Age: 80 y.o. Sex: male      Other Providers:  Katie Mcdonough D.O.    DIAGNOSIS: Adenocarcinoma of the prostate, T1 C, Mendez score 3+4 = 7, PSA 3.6    SITE TREATED AND DOSE DELIVERED: Mr. Vandana Long received CyberKnife SBRT delivering 3625 cGy in 5 fractions of 725 cGy each using 6 MV the Hosted Americae MLC with 6 MV photons prescribed to the 84% isodose line. The plan required 42 beams and had a treatment time of 24 minutes per fraction. TREATMENT DATES: Treatment was delivered from 10/12/15 through 10/23/15. HISTORY OF PRESENT ILLNESS: Mr. Vandana Long is a 71-year-old gentleman with intermediate risk prostate cancer. On 02/14/14 his PSA was 6.24. Repeat PSA on 03/14/14 was 5.2. Prior to that the patient reports that his PSA had been in the 3 range. He has a family history significant for uncle with prostate cancer who was treated with brachytherapy. He was recommended to undergo TRUS guided biopsy. This was performed on 04/01/14 and demonstrated one core of Mendez score 3+4 = 7 prostate cancer in 10% of the tissue from the left apex. Several other cores of high-grade PIN and 2 cores with atypical cells suspicious for adenocarcinoma were identified. He discussed various management options with Dr. Rossana Gonzalez, but elected to go on a program of active surveillance. He was going through a divorce and was not prepared to deal with further management of his prostate cancer at that time. PSA on 10/03/14 was 4.6. PSA on 05/01/15 was 3.6. He underwent repeat biopsy on 06/09/15 that demonstrated a 49 cc prostate for a PSA density of 0.07. Pathology revealed 3 cores containing adenocarcinoma. These included lesion score 3+4 = 7 disease in 50% of the tissue from the left apex and 50% of the tissue from the left mid gland. Perineural invasion was identified in both of these cores.  In addition, the left lateral apex core contained [place] score 3+3 = 6 disease in 10% of the specimen. Atypical small acinar proliferation was seen in the core from the left lateral base and high-grade PIN was seen in the core from the left lateral mid gland. The patient had further discussion of potential management options with Dr. Carmen Hdez including surgery, radiation therapy, cryosurgery, HIFU, androgen deprivation and watchful waiting including active surveillance. I saw Mr. Tacos Olmedo on 07/09/15 for further discussion of management options for this intermediate risk prostate cancer. I presented his case at the [place] [id number] at [facility] and at [facility]. Recommendation was for IMRT with or without 6 months of ADT vs. enrollment on RTOG 0815 vs. SBRT. For any of these radiation based approaches, he was felt to be a good candidate for SpaceOAR hydrogel. MRI on 07/17/15 demonstrated a 12 mm focus in the left apex suspicious for primary tumor. No extracapsular extension, seminal vesicle invasion or pelvic lymphadenopathy was identified. On 09/17/15 he underwent placement of SpaceOAR and fiducial markers. He then underwent prostate SBRT with CyberKnife. He received a dose of 3625 cGy in 5 fractions of 725 cGy each. Treatment was delivered from 10/12/15 through 10/23/15. He had some worsening of his urinary function during treatment. He had nocturia x4 compared with nocturia x1 prior to initiation of treatment. He had mild dysuria. He had increased urinary urgency. He had some mild leakage of urine following his catheterization for his CT simulation. He had decreased force of stream and hesitancy. He had small bowel movements up to 5x daily, but denied diarrhea. INTERVAL HISTORY: Mr. Tacos Olmedo returns for follow up 19 months after completion of CyberKnife SBRT. At this time, Mr. Tacos Olmedo is doing reasonably well. He has an AUA score of 13/35, down from 17/35 at his previous visit and is \"mostly satisfied\" with his urinary function.  He has nocturia x2-4, depending on how much he drinks. He continues with frequency and urgency, but improved since last seen by us. He has good force of stream most of the time. He denies any urinary leakage or incontinence, but does report occasional \"dribbling\". He has 1-2 small bowel movements daily and denies melanotic or bloody stools. He has occasional constipation which is controlled with softeners. He has moderate erectile dysfunction. He was given Viagra/Cialis, but he reports the medication didn't work well, but considers this to be a small problem at this time. He is not interested in pursing other medication for this at this time. He recently had his pacemaker replaced. Overall he is feeling good and has no new complaints. MEDICATIONS:     Current Outpatient Prescriptions:     HYDROcodone-acetaminophen (NORCO) 5-325 mg per tablet, Take 1 Tab by mouth every four (4) hours as needed. Max Daily Amount: 6 Tabs., Disp: 15 Tab, Rfl: 0    glipiZIDE (GLUCOTROL) 10 mg tablet, Take 10 mg by mouth two (2) times a day., Disp: , Rfl:     doxycycline (VIBRAMYCIN) 50 mg capsule, Take 50 mg by mouth as needed. , Disp: , Rfl:     omeprazole (PRILOSEC) 40 mg capsule, Take 40 mg by mouth daily as needed. , Disp: , Rfl:     lisinopril (PRINIVIL, ZESTRIL) 5 mg tablet, Take  by mouth daily. , Disp: , Rfl:     gemfibrozil (LOPID) 600 mg tablet, Take 600 mg by mouth daily. , Disp: , Rfl:     cetirizine (ZYRTEC) 10 mg tablet, Take 10 mg by mouth every morning., Disp: , Rfl:     doxazosin (CARDURA) 8 mg tablet, Take 8 mg by mouth nightly. Indications: HYPERTENSION, SYMPTOMATIC BENIGN PROSTATIC HYPERPLASIA, Disp: , Rfl:     multivitamin (ONE A DAY) tablet, Take 1 Tab by mouth daily. , Disp: , Rfl:     ALLERGIES:   No Known Allergies    PHYSICAL EXAMINATION:   ECOG Performance status 1  VITAL SIGNS:   Visit Vitals    /84 (BP 1 Location: Left arm, BP Patient Position: Sitting)    Pulse 67    Temp 97.6 °F (36.4 °C) (Oral)    Wt 216 lb 11.2 oz (98.3 kg)    SpO2 96%    BMI 34.98 kg/m2       GENERAL: The patient is well-developed, ambulatory, alert and in no acute distress. Lungs clear. Heart in NSR (pacemaker). The remainder of the physical exam was deferred. LABORATORY:   PSA on 05/02/17 was 0.7  PSA on 02/17/17 was 0.613  PSA on 11/04/16 was 1.2. PSA on 05/06/16 was 2.3. PSA on 02/05/16 was 2.7. PSA on 11/12/15 was 3.7. PSA on 05/01/15 was 3.6. RADIOLOGY:  Ct Head Wo Cont  Exam:  CT of the Head without IV contrast.  Indication:  Severe headache   Comparison: None available  Findings:  No intracranial hemorrhage, mass effect, midline shift, or extra-axial fluid collections. Gray-white matter differentiation is maintained, without CT evidence of acute ischemia. The ventricles and basal cisterns are of normal size and configuration. Hypodensity in the inferior left basal ganglia likely represents a prominent perivascular space. Paranasal sinuses and mastoid air cells are clear. No acute osseous abnormalities. 9/17/2015   IMPRESSION:  No acute intracranial findings. Xr Chest Port  History: Post pacemaker placement. Evaluate for pneumothorax. Portable AP upright chest dated 9/16/15. A left-sided pacemaker has its leads in the right atrium and right ventricle. Pacer wires are intact. The heart is mildly enlarged in transverse diameter. Lungs are clear. There is no pneumothorax, focal infiltrate, or effusion. 9/16/2015   Impression: No acute cardiopulmonary abnormality. No pneumothorax following pacemaker placement. IMPRESSION:  Jaden Cr is a 80 y.o. male with intermediate risk adenocarcinoma of the prostate, T1 C, Mendez score 3+4 = 7, PSA 3.6. He is now 19 months following completion of SBRT. He denies any lingering side effects from SBRT. His PSA remains stable. He had little to no results from Viagra/Cialis for ED, to discuss other options with Dr. Vickey Hernandez at his next appointment.       PLAN:   1) Follow up Dr. Elli Ybarra as scheduled - 08/17  2) Return 6 months with labs prior    Cedric Ornelas, NP   May 9, 2017      Portions of this note were copied from prior encounters and reviewed for accuracy, currency, and represent documentation and tasks completed during this encounter. I verify and attest these portions to be unchanged from prior visits.

## 2017-11-07 ENCOUNTER — HOSPITAL ENCOUNTER (OUTPATIENT)
Dept: RADIATION ONCOLOGY | Age: 72
Discharge: HOME | End: 2017-11-07
Payer: MEDICARE

## 2017-11-07 DIAGNOSIS — C61 PROSTATE CANCER (HCC): ICD-10-CM

## 2017-11-07 LAB — PSA SERPL-MCNC: 0.3 NG/ML

## 2017-11-07 PROCEDURE — 84153 ASSAY OF PSA TOTAL: CPT | Performed by: NURSE PRACTITIONER

## 2017-11-07 PROCEDURE — 84403 ASSAY OF TOTAL TESTOSTERONE: CPT | Performed by: NURSE PRACTITIONER

## 2017-11-07 PROCEDURE — 36415 COLL VENOUS BLD VENIPUNCTURE: CPT | Performed by: NURSE PRACTITIONER

## 2017-11-08 LAB — TESTOST SERPL-MCNC: 443 NG/DL (ref 264–916)

## 2017-11-14 ENCOUNTER — HOSPITAL ENCOUNTER (OUTPATIENT)
Dept: RADIATION ONCOLOGY | Age: 72
Discharge: HOME | End: 2017-11-14
Payer: MEDICARE

## 2017-11-14 VITALS
OXYGEN SATURATION: 96 % | HEART RATE: 81 BPM | TEMPERATURE: 98.6 F | BODY MASS INDEX: 34.59 KG/M2 | WEIGHT: 214.3 LBS | SYSTOLIC BLOOD PRESSURE: 134 MMHG | DIASTOLIC BLOOD PRESSURE: 73 MMHG | RESPIRATION RATE: 18 BRPM

## 2017-11-14 DIAGNOSIS — C61 PROSTATE CANCER (HCC): Primary | ICD-10-CM

## 2017-11-14 PROCEDURE — 99211 OFF/OP EST MAY X REQ PHY/QHP: CPT

## 2017-11-14 NOTE — PROGRESS NOTES
Patient: Luke Mckeon MRN: [id number]  SSN: [id number]    YOB: 1945  Age: 71 y.o. Sex: male      Other Providers:  Peggy Quintana D.O.    DIAGNOSIS: Adenocarcinoma of the prostate, T1 C, Austin score 3+4 = 7, PSA 3.6    SITE TREATED AND DOSE DELIVERED: Mr. Sarahi Hill received CyberKnife SBRT delivering 3625 cGy in 5 fractions of 725 cGy each using 6 MV the Zanke MLC with 6 MV photons prescribed to the 84% isodose line. The plan required 42 beams and had a treatment time of 24 minutes per fraction. TREATMENT DATES: Treatment was delivered from 10/12/15 through 10/23/15. HISTORY OF PRESENT ILLNESS: Mr. Sarahi Hill is a 61-year-old gentleman with intermediate risk prostate cancer. On 02/14/14 his PSA was 6.24. Repeat PSA on 03/14/14 was 5.2. Prior to that the patient reports that his PSA had been in the 3 range. He has a family history significant for uncle with prostate cancer who was treated with brachytherapy. He was recommended to undergo TRUS guided biopsy. This was performed on 04/01/14 and demonstrated one core of Austin score 3+4 = 7 prostate cancer in 10% of the tissue from the left apex. Several other cores of high-grade PIN and 2 cores with atypical cells suspicious for adenocarcinoma were identified. He discussed various management options with Dr. Jerman Plata, but elected to go on a program of active surveillance. He was going through a divorce and was not prepared to deal with further management of his prostate cancer at that time. PSA on 10/03/14 was 4.6. PSA on 05/01/15 was 3.6. He underwent repeat biopsy on 06/09/15 that demonstrated a 49 cc prostate for a PSA density of 0.07. Pathology revealed 3 cores containing adenocarcinoma. These included lesion score 3+4 = 7 disease in 50% of the tissue from the left apex and 50% of the tissue from the left mid gland. Perineural invasion was identified in both of these cores.  In addition, the left lateral apex core contained Mendez score 3+3 = 6 disease in 10% of the specimen. Atypical small acinar proliferation was seen in the core from the left lateral base and high-grade PIN was seen in the core from the left lateral mid gland. The patient had further discussion of potential management options with Dr. Moon Bhatti including surgery, radiation therapy, cryosurgery, HIFU, androgen deprivation and watchful waiting including active surveillance. I saw Mr. Brenton Dasilva on 07/09/15 for further discussion of management options for this intermediate risk prostate cancer. His case was presented at the [place] [id number] at both [place] and at [address] [place] [address]. Recommendation was for IMRT with or without 6 months of ADT vs. enrollment on RTOG 0815 vs. SBRT. For any of these radiation based approaches, he was felt to be a good candidate for SpaceOAR hydrogel. MRI on 07/17/15 demonstrated a 12 mm focus in the left apex suspicious for primary tumor. No extracapsular extension, seminal vesicle invasion or pelvic lymphadenopathy was identified. On 09/17/15 he underwent placement of SpaceOAR and fiducial markers. He then underwent prostate SBRT with CyberKnife. He received a dose of 3625 cGy in 5 fractions of 725 cGy each. Treatment was delivered from 10/12/15 through 10/23/15. He had some worsening of his urinary function during treatment. He had nocturia x4 compared with nocturia x1 prior to initiation of treatment. He had mild dysuria. He had increased urinary urgency. He had some mild leakage of urine following his catheterization for his CT simulation. He had decreased force of stream and hesitancy. He had small bowel movements up to 5x daily, but denied diarrhea. INTERVAL HISTORY: Mr. Brenton Dasilva returns for follow up 24 months after completion of CyberKnife SBRT. At this time, Mr. Brenton Dasivla is doing reasonably well. He has an AUA score of 15/35,  and is \"mostly disatisfied\" with his urinary function.  He reports that his urinary function is essentially unchanged. He also states that his urinary issues are most likely due to his history of diabetes. He has nocturia x2-4, depending on how much he drinks. He continues with frequency and urgency, but improved since last seen by us. He has good force of stream most of the time. He complains of urinary \"dribbling\" that requires him to wear a pad. He has 1-2 small bowel movements daily and denies melanotic or bloody stools. He has occasional constipation which is controlled with softeners. He has moderate erectile dysfunction. He was given Viagra/Cialis, but he reports the medication didn't work well, but considers this to be a small problem at this time. He is not interested in pursing other medication for this at this time. Overall he is feeling good and has no new complaints. MEDICATIONS:     Current Outpatient Prescriptions:     metFORMIN ER (GLUCOPHAGE XR) 500 mg tablet, Take 750 mg by mouth daily (with dinner). , Disp: , Rfl:     naproxen (NAPROSYN) 500 mg tablet, Take 500 mg by mouth two (2) times daily (with meals). , Disp: , Rfl:     HYDROcodone-acetaminophen (NORCO) 5-325 mg per tablet, Take 1 Tab by mouth every four (4) hours as needed. Max Daily Amount: 6 Tabs., Disp: 15 Tab, Rfl: 0    glipiZIDE (GLUCOTROL) 10 mg tablet, Take 10 mg by mouth two (2) times a day., Disp: , Rfl:     doxycycline (VIBRAMYCIN) 50 mg capsule, Take 50 mg by mouth as needed. , Disp: , Rfl:     omeprazole (PRILOSEC) 40 mg capsule, Take 40 mg by mouth daily as needed. , Disp: , Rfl:     lisinopril (PRINIVIL, ZESTRIL) 5 mg tablet, Take  by mouth daily. , Disp: , Rfl:     gemfibrozil (LOPID) 600 mg tablet, Take 600 mg by mouth daily. , Disp: , Rfl:     cetirizine (ZYRTEC) 10 mg tablet, Take 10 mg by mouth every morning., Disp: , Rfl:     doxazosin (CARDURA) 8 mg tablet, Take 8 mg by mouth nightly.  Indications: HYPERTENSION, SYMPTOMATIC BENIGN PROSTATIC HYPERPLASIA, Disp: , Rfl:     multivitamin (ONE A DAY) tablet, Take 1 Tab by mouth daily. , Disp: , Rfl:     ALLERGIES:   No Known Allergies    PHYSICAL EXAMINATION:   ECOG Performance status 1  VITAL SIGNS:   There were no vitals taken for this visit. GENERAL: The patient is well-developed, ambulatory, alert and in no acute distress. Lungs clear. Heart in NSR (pacemaker). The remainder of the physical exam was deferred. LABORATORY:   PSA on 11/07/17 was 0.3  PSA on 08/19/17 was 0.551  PSA on 05/02/17 was 0.7  PSA on 02/17/17 was 0.613  PSA on 11/04/16 was 1.2. PSA on 05/06/16 was 2.3. PSA on 02/05/16 was 2.7. PSA on 11/12/15 was 3.7. PSA on 05/01/15 was 3.6. RADIOLOGY:  Ct Head Wo Cont  Exam:  CT of the Head without IV contrast.  Indication:  Severe headache   Comparison: None available  Findings:  No intracranial hemorrhage, mass effect, midline shift, or extra-axial fluid collections. Gray-white matter differentiation is maintained, without CT evidence of acute ischemia. The ventricles and basal cisterns are of normal size and configuration. Hypodensity in the inferior left basal ganglia likely represents a prominent perivascular space. Paranasal sinuses and mastoid air cells are clear. No acute osseous abnormalities. 9/17/2015   IMPRESSION:  No acute intracranial findings. Xr Chest Port  History: Post pacemaker placement. Evaluate for pneumothorax. Portable AP upright chest dated 9/16/15. A left-sided pacemaker has its leads in the right atrium and right ventricle. Pacer wires are intact. The heart is mildly enlarged in transverse diameter. Lungs are clear. There is no pneumothorax, focal infiltrate, or effusion. 9/16/2015   Impression: No acute cardiopulmonary abnormality. No pneumothorax following pacemaker placement. IMPRESSION:  Adri Barrientos is a 71 y.o. male with intermediate risk adenocarcinoma of the prostate, T1 C, Mendez score 3+4 = 7, PSA 3.6. He is now 24 months following completion of SBRT.  He denies any lingering side effects from SBRT. His PSA remains stable. He was given Viagra/Cialis, but he reports the medication didn't work well, but considers this to be a small problem at this time. He is not interested in pursing other medication for this at this time. PLAN:   1) Follow up Dr. Chinyere Neil as scheduled - 3/2/18  2) Return 6 months with labs prior    Patria Rainey NP   November 14, 2017      Portions of this note were copied from prior encounters and reviewed for accuracy, currency, and represent documentation and tasks completed during this encounter. I verify and attest these portions to be unchanged from prior visits.

## 2018-04-17 PROBLEM — L71.9 ROSACEA: Status: ACTIVE | Noted: 2018-04-17

## 2018-04-17 PROBLEM — E66.01 SEVERE OBESITY (BMI 35.0-39.9) WITH COMORBIDITY (HCC): Status: ACTIVE | Noted: 2018-04-17

## 2018-04-30 ENCOUNTER — APPOINTMENT (RX ONLY)
Dept: URBAN - METROPOLITAN AREA CLINIC 24 | Facility: CLINIC | Age: 73
Setting detail: DERMATOLOGY
End: 2018-04-30

## 2018-04-30 DIAGNOSIS — D18.0 HEMANGIOMA: ICD-10-CM

## 2018-04-30 DIAGNOSIS — L70.8 OTHER ACNE: ICD-10-CM

## 2018-04-30 DIAGNOSIS — L82.1 OTHER SEBORRHEIC KERATOSIS: ICD-10-CM

## 2018-04-30 DIAGNOSIS — L91.8 OTHER HYPERTROPHIC DISORDERS OF THE SKIN: ICD-10-CM

## 2018-04-30 DIAGNOSIS — L21.8 OTHER SEBORRHEIC DERMATITIS: ICD-10-CM

## 2018-04-30 DIAGNOSIS — L71.8 OTHER ROSACEA: ICD-10-CM

## 2018-04-30 DIAGNOSIS — L82.0 INFLAMED SEBORRHEIC KERATOSIS: ICD-10-CM

## 2018-04-30 DIAGNOSIS — Z87.2 PERSONAL HISTORY OF DISEASES OF THE SKIN AND SUBCUTANEOUS TISSUE: ICD-10-CM

## 2018-04-30 PROBLEM — D18.01 HEMANGIOMA OF SKIN AND SUBCUTANEOUS TISSUE: Status: ACTIVE | Noted: 2018-04-30

## 2018-04-30 PROBLEM — L55.1 SUNBURN OF SECOND DEGREE: Status: ACTIVE | Noted: 2018-04-30

## 2018-04-30 PROCEDURE — 99214 OFFICE O/P EST MOD 30 MIN: CPT | Mod: 25

## 2018-04-30 PROCEDURE — ? TREATMENT REGIMEN

## 2018-04-30 PROCEDURE — ? LIQUID NITROGEN

## 2018-04-30 PROCEDURE — ? COUNSELING

## 2018-04-30 PROCEDURE — ? ACNE SURGERY

## 2018-04-30 PROCEDURE — 11200 RMVL SKIN TAGS UP TO&INC 15: CPT | Mod: 59

## 2018-04-30 PROCEDURE — 10040 EXTRACTION: CPT

## 2018-04-30 PROCEDURE — ? SKIN TAG REMOVAL WITH PATHOLOGY

## 2018-04-30 PROCEDURE — 17110 DESTRUCTION B9 LES UP TO 14: CPT

## 2018-04-30 ASSESSMENT — LOCATION DETAILED DESCRIPTION DERM
LOCATION DETAILED: STERNUM
LOCATION DETAILED: EPIGASTRIC SKIN
LOCATION DETAILED: RIGHT POSTERIOR SHOULDER
LOCATION DETAILED: LEFT ANTERIOR SHOULDER
LOCATION DETAILED: LEFT INFERIOR LATERAL UPPER BACK
LOCATION DETAILED: LEFT INFERIOR MEDIAL LOWER BACK
LOCATION DETAILED: LEFT INFERIOR MEDIAL UPPER BACK
LOCATION DETAILED: LEFT NASOLABIAL FOLD
LOCATION DETAILED: LEFT SUPERIOR UPPER BACK
LOCATION DETAILED: LEFT INFERIOR UPPER BACK
LOCATION DETAILED: RIGHT INFERIOR MEDIAL MALAR CHEEK
LOCATION DETAILED: RIGHT CLAVICULAR NECK

## 2018-04-30 ASSESSMENT — LOCATION SIMPLE DESCRIPTION DERM
LOCATION SIMPLE: ABDOMEN
LOCATION SIMPLE: LEFT SHOULDER
LOCATION SIMPLE: RIGHT ANTERIOR NECK
LOCATION SIMPLE: RIGHT SHOULDER
LOCATION SIMPLE: RIGHT CHEEK
LOCATION SIMPLE: CHEST
LOCATION SIMPLE: LEFT UPPER BACK
LOCATION SIMPLE: LEFT LIP
LOCATION SIMPLE: LEFT LOWER BACK

## 2018-04-30 ASSESSMENT — LOCATION ZONE DERM
LOCATION ZONE: TRUNK
LOCATION ZONE: ARM
LOCATION ZONE: LIP
LOCATION ZONE: NECK
LOCATION ZONE: FACE

## 2018-04-30 NOTE — PROCEDURE: ACNE SURGERY
Render Number Of Lesions Treated: no
Consent was obtained and risks were reviewed including but not limited to scarring, infection, bleeding, scabbing, incomplete removal, and allergy to anesthesia.
Detail Level: Detailed
Extraction Method: lancet and extractor
Post-Care Instructions: I reviewed with the patient in detail post-care instructions. Patient is to keep the treatment areaas dry overnight, and then apply bacitracin twice daily until healed. Patient may apply hydrogen peroxide soaks to remove any crusting.
Acne Type: Comedonal Lesions
Prep Text (Optional): Prior to removal the treatment areas were prepped in the usual fashion.

## 2018-04-30 NOTE — PROCEDURE: SKIN TAG REMOVAL WITH PATHOLOGY
Bill For Surgical Tray: no
Notification Instructions: Patient will be notified of biopsy results. However, patient instructed to call the office if not contacted within 2 weeks.
Hemostasis: Drysol
Total Number Of Lesions Treated: 1
Post-Care Instructions: I reviewed with the patient in detail post-care instructions. Patient is to keep the site dry overnight, and then apply bacitracin twice daily until healed.
Biopsy Type: H and E
Anesthesia Type: 1% lidocaine without epinephrine and a 1:10 solution of 8.4% sodium bicarbonate
Consent: Written consent was obtained and risks were reviewed including but not limited to scarring, infection, bleeding, scabbing, incomplete removal, nerve damage and allergy to anesthesia.
Accession #: pc
Medical Necessity Clause: This procedure was medically necessary because the lesions that were treated were:
Billing Type: Third-Party Bill
Additional Anesthesia Volume In Cc: 6
Post Procedure Care: Following the procedure hemostasis was acheived with drysol and bandages.
Detail Level: Detailed
Anesthesia Volume In Cc: 0.5
Skin Tags Removed With: gradle scissors
Medical Necessity Information: It is in your best interest to select a reason for this procedure from the list below. All of these items fulfill various CMS LCD requirements except the new and changing color options.

## 2018-04-30 NOTE — PROCEDURE: LIQUID NITROGEN
Render Post-Care Instructions In Note?: no
Number Of Freeze-Thaw Cycles: 1 freeze-thaw cycle
Consent: The patient's verbal consent was obtained including but not limited to risks of crusting, scabbing, blistering, scarring, darker or lighter pigmentary change, recurrence, incomplete removal and infection.
Detail Level: Detailed
Post-Care Instructions: I reviewed with the patient in detail post-care instructions. Patient is to wear sunprotection, and avoid picking at any of the treated lesions. Pt may apply Vaseline to crusted or scabbing areas.
Medical Necessity Information: It is in your best interest to select a reason for this procedure from the list below. All of these items fulfill various CMS LCD requirements except the new and changing color options.
Medical Necessity Clause: Treatment was medically necessary because the spot was

## 2018-05-22 ENCOUNTER — HOSPITAL ENCOUNTER (OUTPATIENT)
Dept: RADIATION ONCOLOGY | Age: 73
Discharge: HOME | End: 2018-05-22
Payer: MEDICARE

## 2018-05-22 DIAGNOSIS — C61 PROSTATE CANCER (HCC): ICD-10-CM

## 2018-05-22 PROCEDURE — 84403 ASSAY OF TOTAL TESTOSTERONE: CPT | Performed by: RADIOLOGY

## 2018-05-22 PROCEDURE — 84153 ASSAY OF PSA TOTAL: CPT | Performed by: RADIOLOGY

## 2018-05-22 PROCEDURE — 36415 COLL VENOUS BLD VENIPUNCTURE: CPT | Performed by: RADIOLOGY

## 2018-05-23 LAB
PSA SERPL DL<=0.01 NG/ML-MCNC: 0.24 NG/ML (ref 0–4)
TESTOST SERPL-MCNC: 565 NG/DL (ref 264–916)

## 2018-05-29 ENCOUNTER — HOSPITAL ENCOUNTER (OUTPATIENT)
Dept: RADIATION ONCOLOGY | Age: 73
Discharge: HOME | End: 2018-05-29
Payer: MEDICARE

## 2018-05-29 ENCOUNTER — APPOINTMENT (OUTPATIENT)
Dept: RADIATION ONCOLOGY | Age: 73
End: 2018-05-29
Payer: MEDICARE

## 2018-05-29 VITALS
HEIGHT: 66 IN | OXYGEN SATURATION: 96 % | SYSTOLIC BLOOD PRESSURE: 137 MMHG | DIASTOLIC BLOOD PRESSURE: 84 MMHG | HEART RATE: 66 BPM | TEMPERATURE: 98.1 F | WEIGHT: 212.2 LBS | RESPIRATION RATE: 16 BRPM | BODY MASS INDEX: 34.1 KG/M2

## 2018-05-29 DIAGNOSIS — C61 PROSTATE CANCER (HCC): Primary | ICD-10-CM

## 2018-05-29 PROCEDURE — 99211 OFF/OP EST MAY X REQ PHY/QHP: CPT

## 2018-05-29 NOTE — PROGRESS NOTES
Patient: Bruna Rm MRN: [id number]  SSN: [id number]    YOB: 1945  Age: 71 y.o. Sex: male      Other Providers:  Whitney Mcarthur D.O.    DIAGNOSIS: Adenocarcinoma of the prostate, T1 C, Mendez score 3+4 = 7, PSA 3.6    SITE TREATED AND DOSE DELIVERED: Mr. Raudel Silva received CyberKnife SBRT delivering 3625 cGy in 5 fractions of 725 cGy each using 6 MV the [place] [place] MLC with 6 MV photons prescribed to the 84% isodose line. The plan required 42 beams and had a treatment time of 24 minutes per fraction. TREATMENT DATES: Treatment was delivered from 10/12/15 through 10/23/15. HISTORY OF PRESENT ILLNESS: Mr. Raudel Silva is a 60-year-old gentleman with intermediate risk prostate cancer. On 02/14/14 his PSA was 6.24. Repeat PSA on 03/14/14 was 5.2. Prior to that the patient reports that his PSA had been in the 3 range. He has a family history significant for uncle with prostate cancer who was treated with brachytherapy. He was recommended to undergo TRUS guided biopsy. This was performed on 04/01/14 and demonstrated one core of Mendez score 3+4 = 7 prostate cancer in 10% of the tissue from the left apex. Several other cores of high-grade PIN and 2 cores with atypical cells suspicious for adenocarcinoma were identified. He discussed various management options with Dr. Mick Olivares, but elected to go on a program of active surveillance. He was going through a divorce and was not prepared to deal with further management of his prostate cancer at that time. PSA on 10/03/14 was 4.6. PSA on 05/01/15 was 3.6. He underwent repeat biopsy on 06/09/15 that demonstrated a 49 cc prostate for a PSA density of 0.07. Pathology revealed 3 cores containing adenocarcinoma. These included lesion score 3+4 = 7 disease in 50% of the tissue from the left apex and 50% of the tissue from the left mid gland. Perineural invasion was identified in both of these cores.  In addition, the left lateral apex core contained Patterson score 3+3 = 6 disease in 10% of the specimen. Atypical small acinar proliferation was seen in the core from the left lateral base and high-grade PIN was seen in the core from the left lateral mid gland. The patient had further discussion of potential management options with Dr. Mick Olivares including surgery, radiation therapy, cryosurgery, HIFU, androgen deprivation and watchful waiting including active surveillance. I saw Mr. Raudel Silva on 07/09/15 for further discussion of management options for this intermediate risk prostate cancer. His case was presented at the [place] [address] at [facility] and at [facility]. Recommendation was for IMRT with or without 6 months of ADT vs. enrollment on RTOG 0815 vs. SBRT. For any of these radiation based approaches, he was felt to be a good candidate for SpaceOAR hydrogel. MRI on 07/17/15 demonstrated a 12 mm focus in the left apex suspicious for primary tumor. No extracapsular extension, seminal vesicle invasion or pelvic lymphadenopathy was identified. On 09/17/15 he underwent placement of SpaceOAR and fiducial markers. He then underwent prostate SBRT with CyberKnife. He received a dose of 3625 cGy in 5 fractions of 725 cGy each. Treatment was delivered from 10/12/15 through 10/23/15. He had some worsening of his urinary function during treatment. He had nocturia x4 compared with nocturia x1 prior to initiation of treatment. He had mild dysuria. He had increased urinary urgency. He had some mild leakage of urine following his catheterization for his CT simulation. He had decreased force of stream and hesitancy. He had small bowel movements up to 5x daily, but denied diarrhea. INTERVAL HISTORY: Mr. Raudel Silva returns for follow up 2-1/2 years after completion of CyberKnife SBRT. At this time, Mr. Raudel Silva is doing reasonably well. He has an AUA score of 10/35,  and is \"mostly satisfied\" with his urinary function.  He reports that his urinary function is essentially unchanged. He also states that his urinary issues are most likely due to his history of diabetes. He has nocturia x1-2, depending on how much he drinks. He continues with frequency and urgency, but improved since last seen by us. He has good force of stream most of the time. He complains of urinary \"dribbling\" that does not require him to wear a pad. He has 1-2 small bowel movements daily and denies melanotic or bloody stools. He has occasional constipation which is controlled with softeners. He has moderate erectile dysfunction. He was given Viagra/Cialis, but he reports the medication didn't work well, but considers this to be a small problem at this time. Prescribed Trimix by Dr. Tracie Dance, but unsure of how to titrate dose. Overall he is feeling good and has no new complaints. MEDICATIONS:     Current Outpatient Prescriptions:     glucose blood VI test strips (ASCENSIA CONTOUR) strip, by Does Not Apply route See Admin Instructions. Testing BS's daily as directed, Disp: , Rfl:     azelaic acid (FINACEA) 15 % topical gel, Apply  to affected area daily. , Disp: , Rfl:     doxycycline (VIBRAMYCIN) 100 mg capsule, Take 100 mg by mouth daily as needed. , Disp: , Rfl:     metFORMIN ER (GLUCOPHAGE XR) 750 mg tablet, Take 750 mg by mouth daily. , Disp: , Rfl:     naproxen (NAPROSYN) 500 mg tablet, Take 500 mg by mouth two (2) times daily (with meals). , Disp: , Rfl:     glipiZIDE (GLUCOTROL) 10 mg tablet, Take 10 mg by mouth two (2) times a day., Disp: , Rfl:     omeprazole (PRILOSEC) 40 mg capsule, Take 40 mg by mouth daily as needed. , Disp: , Rfl:     lisinopril (PRINIVIL, ZESTRIL) 5 mg tablet, Take 5 mg by mouth daily. , Disp: , Rfl:     gemfibrozil (LOPID) 600 mg tablet, Take 600 mg by mouth two (2) times a day., Disp: , Rfl:     cetirizine (ZYRTEC) 10 mg tablet, Take 10 mg by mouth every morning., Disp: , Rfl:     doxazosin (CARDURA) 8 mg tablet, Take 8 mg by mouth nightly.  Indications: HYPERTENSION, SYMPTOMATIC BENIGN PROSTATIC HYPERPLASIA, Disp: , Rfl:     multivitamin (ONE A DAY) tablet, Take 1 Tab by mouth daily. , Disp: , Rfl:     ALLERGIES:   No Known Allergies    PHYSICAL EXAMINATION:   ECOG Performance status 1  VITAL SIGNS:   Visit Vitals    /84 (BP 1 Location: Left arm, BP Patient Position: Sitting)    Pulse 66    Temp 98.1 °F (36.7 °C)    Resp 16    Ht 5' 6\" (1.676 m)    Wt 96.3 kg (212 lb 3.2 oz)    SpO2 96%    BMI 34.25 kg/m2       GENERAL: The patient is well-developed, ambulatory, alert and in no acute distress. Lungs clear. Heart in NSR (pacemaker). The remainder of the physical exam was deferred. LABORATORY:   PSA on 05/22/18 was 0.241 with testosterone of 565  PSA on 11/07/17 was 0.3  PSA on 08/19/17 was 0.551  PSA on 05/02/17 was 0.7  PSA on 02/17/17 was 0.613  PSA on 11/04/16 was 1.2. PSA on 05/06/16 was 2.3. PSA on 02/05/16 was 2.7. PSA on 11/12/15 was 3.7. PSA on 05/01/15 was 3.6. RADIOLOGY:  None    IMPRESSION:  Juan M Juarez is a 71 y.o. male with intermediate risk adenocarcinoma of the prostate, T1 C, Mendez score 3+4 = 7, PSA 3.6. He is now 2-1/2 years following completion of SBRT. He denies any lingering side effects from SBRT. His PSA remains stable at 0.241. Using Trimix under the direction of Dr. Margarita Curtis. PLAN:   1) Follow up Dr. Margarita Curtis as scheduled - 9/2/18. He will call Dr. Elbert Rios office to discuss titration of Trimix dosage. 2) Return 6 months with labs prior  3) I have had a 25 minute follow-up with Mr. Jacquelin Abraham of which greater than half has been spent counseling him about continued management of his prostate cancer and erectile dysfunction. Jennie Medina MD   May 29, 2018      Portions of this note were copied from prior encounters and reviewed for accuracy, currency, and represent documentation and tasks completed during this encounter. I verify and attest these portions to be unchanged from prior visits.

## 2018-05-29 NOTE — PROGRESS NOTES
F/u prostate cancer. RT end 10-23-15. S/p Cyberknife SBRT. Aua/Epic completed. Aua score 14. Labs 5-22-18.     James Sharma RN

## 2018-08-23 PROBLEM — R09.81 NASAL CONGESTION: Status: ACTIVE | Noted: 2018-03-02

## 2018-08-23 PROBLEM — E83.52 HYPERCALCEMIA: Status: ACTIVE | Noted: 2018-08-23

## 2018-08-23 PROBLEM — K21.9 GASTROESOPHAGEAL REFLUX DISEASE WITHOUT ESOPHAGITIS: Status: ACTIVE | Noted: 2018-04-12

## 2018-11-27 ENCOUNTER — APPOINTMENT (OUTPATIENT)
Dept: RADIATION ONCOLOGY | Age: 73
End: 2018-11-27

## 2019-01-23 PROBLEM — I50.22 SYSTOLIC CHF, CHRONIC (HCC): Status: ACTIVE | Noted: 2017-02-15

## 2019-01-23 PROBLEM — E21.3 HYPERPARATHYROIDISM (HCC): Status: ACTIVE | Noted: 2019-01-23

## 2019-03-07 PROBLEM — E04.1 THYROID NODULE: Status: ACTIVE | Noted: 2019-03-07

## 2019-03-07 PROBLEM — E21.0 PRIMARY HYPERPARATHYROIDISM (HCC): Status: ACTIVE | Noted: 2019-01-23

## 2019-03-12 PROBLEM — C73 PAPILLARY THYROID CARCINOMA (HCC): Status: ACTIVE | Noted: 2019-03-07

## 2019-03-25 ENCOUNTER — HOSPITAL ENCOUNTER (OUTPATIENT)
Dept: NUCLEAR MEDICINE | Age: 74
Discharge: HOME | End: 2019-03-25
Attending: INTERNAL MEDICINE
Payer: MEDICARE

## 2019-03-25 DIAGNOSIS — E21.0 PRIMARY HYPERPARATHYROIDISM (HCC): ICD-10-CM

## 2019-03-25 PROCEDURE — 78070 PARATHYROID PLANAR IMAGING: CPT

## 2019-03-26 NOTE — PROGRESS NOTES
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 Please inform the patient and then forward the results of the parathyroid scan to Dr. Torsten Ambrose.

## 2019-04-04 DIAGNOSIS — C73 PAPILLARY THYROID CARCINOMA (HCC): ICD-10-CM

## 2019-04-04 DIAGNOSIS — E21.3 HYPERPARATHYROIDISM (HCC): Primary | ICD-10-CM

## 2019-04-11 ENCOUNTER — HOSPITAL ENCOUNTER (OUTPATIENT)
Dept: SURGERY | Age: 74
Discharge: HOME | End: 2019-04-11
Payer: MEDICARE

## 2019-04-11 VITALS
HEIGHT: 66 IN | DIASTOLIC BLOOD PRESSURE: 91 MMHG | BODY MASS INDEX: 33.35 KG/M2 | TEMPERATURE: 98 F | WEIGHT: 207.5 LBS | OXYGEN SATURATION: 98 % | RESPIRATION RATE: 16 BRPM | SYSTOLIC BLOOD PRESSURE: 137 MMHG

## 2019-04-11 LAB
ATRIAL RATE: 75 BPM
CALCULATED P AXIS, ECG09: 55 DEGREES
CALCULATED R AXIS, ECG10: -83 DEGREES
CALCULATED T AXIS, ECG11: 80 DEGREES
DIAGNOSIS, 93000: NORMAL
GLUCOSE BLD STRIP.AUTO-MCNC: 132 MG/DL (ref 65–100)
HGB BLD-MCNC: 14.7 G/DL (ref 13.6–17.2)
P-R INTERVAL, ECG05: 176 MS
Q-T INTERVAL, ECG07: 410 MS
QRS DURATION, ECG06: 148 MS
QTC CALCULATION (BEZET), ECG08: 457 MS
VENTRICULAR RATE, ECG03: 75 BPM

## 2019-04-11 PROCEDURE — 85018 HEMOGLOBIN: CPT

## 2019-04-11 PROCEDURE — 82962 GLUCOSE BLOOD TEST: CPT

## 2019-04-11 PROCEDURE — 93005 ELECTROCARDIOGRAM TRACING: CPT | Performed by: ANESTHESIOLOGY

## 2019-04-11 NOTE — PERIOP NOTES
Recent Results (from the past 12 hour(s)) GLUCOSE, POC Collection Time: 04/11/19  2:41 PM  
Result Value Ref Range Glucose (POC) 132 (H) 65 - 100 mg/dL HEMOGLOBIN Collection Time: 04/11/19  2:42 PM  
Result Value Ref Range HGB 14.7 13.6 - 17.2 g/dL Results reviewed, no further action required.

## 2019-04-11 NOTE — PERIOP NOTES
Patient verified name and  Order for consent found in EHR and matches case posting; patient verified. Type 2 surgery, assessment complete. Labs per surgeon: none Labs per anesthesia protocol: Hgb and POC glucose 132 EK19 Hibiclens and instructions given per hospital policy. Patient provided with and instructed on educational handouts including Guide to Surgery, Pain Management, Hand Hygiene, Blood Transfusion Education, and [place] [facility] Brochure. Patient answered medical/surgical history questions at their best of ability. All prior to admission medications documented in [facility]. Original medication list visualized during patient appointment. Patient instructed to hold all vitamins 7 days prior to surgery and NSAIDS 5 days prior to surgery, patient verbalized understanding. Prescription medications to hold: Naproxen Patient instructed to continue previous medications as prescribed prior to surgery and to take the following medications the day of surgery according to anesthesia guidelines with a small sip of water: Zyrtec, Cardura and Flonase. Patient teach back successful and patient demonstrates knowledge of instructions.

## 2019-04-11 NOTE — PERIOP NOTES
Per Dr. Lucita Lanes, no in house pacer rep needed for DOS. Also, Vitamin D level at 5.2. Dr. Lucita Lanes wants patient to continue his prescribed vitamin D. Instructed patient to continue Vitamin D's only, all other vitamins and supplements are to be stopped 7 days prior to surgery.

## 2019-04-15 ENCOUNTER — HOSPITAL ENCOUNTER (OUTPATIENT)
Dept: MAMMOGRAPHY | Age: 74
Discharge: HOME | End: 2019-04-15
Attending: INTERNAL MEDICINE
Payer: MEDICARE

## 2019-04-15 DIAGNOSIS — E21.0 PRIMARY HYPERPARATHYROIDISM (HCC): ICD-10-CM

## 2019-04-15 PROCEDURE — 77080 DXA BONE DENSITY AXIAL: CPT

## 2019-04-17 ENCOUNTER — ANESTHESIA EVENT (OUTPATIENT)
Dept: SURGERY | Age: 74
End: 2019-04-17
Payer: MEDICARE

## 2019-04-18 ENCOUNTER — ANESTHESIA (OUTPATIENT)
Dept: SURGERY | Age: 74
End: 2019-04-18
Payer: MEDICARE

## 2019-04-18 ENCOUNTER — HOSPITAL ENCOUNTER (OUTPATIENT)
Age: 74
Setting detail: OBSERVATION
Discharge: HOME | End: 2019-04-19
Attending: OTOLARYNGOLOGY | Admitting: OTOLARYNGOLOGY
Payer: MEDICARE

## 2019-04-18 DIAGNOSIS — E21.3 HYPERPARATHYROIDISM (HCC): ICD-10-CM

## 2019-04-18 DIAGNOSIS — C73 PAPILLARY THYROID CARCINOMA (HCC): ICD-10-CM

## 2019-04-18 LAB
ABO + RH BLD: NORMAL
BLOOD GROUP ANTIBODIES SERPL: NORMAL
CALCIUM SERPL-MCNC: 10.7 MG/DL (ref 8.3–10.4)
GLUCOSE BLD STRIP.AUTO-MCNC: 167 MG/DL (ref 65–100)
GLUCOSE BLD STRIP.AUTO-MCNC: 210 MG/DL (ref 65–100)
PTH, BASELINE, INTRA-OP, IPTH1T: 93.3 PG/ML (ref 18.5–88)
PTH, POST, IPTH3T: 57.4 PG/ML (ref 18.5–88)
PTH, ZERO TIME, IPTH2T: 69.9 PG/ML (ref 18.5–88)
PTH-INTACT IO % DIF SERPL: 18 %
PTH-INTACT P EXCISION SERPL-MCNC: 14.9 PG/ML
SPECIMEN DRAWN SERPL: 1224
SPECIMEN DRAWN SERPL: 1420
SPECIMEN DRAWN SERPL: 1455
SPECIMEN DRAWN SERPL: NORMAL
SPECIMEN EXP DATE BLD: NORMAL

## 2019-04-18 PROCEDURE — 77030010514 HC APPL CLP LIG COVD -B: Performed by: OTOLARYNGOLOGY

## 2019-04-18 PROCEDURE — 74011000250 HC RX REV CODE- 250: Performed by: OTOLARYNGOLOGY

## 2019-04-18 PROCEDURE — 88307 TISSUE EXAM BY PATHOLOGIST: CPT

## 2019-04-18 PROCEDURE — 99218 HC RM OBSERVATION: CPT

## 2019-04-18 PROCEDURE — 76210000016 HC OR PH I REC 1 TO 1.5 HR: Performed by: OTOLARYNGOLOGY

## 2019-04-18 PROCEDURE — 74011250636 HC RX REV CODE- 250/636

## 2019-04-18 PROCEDURE — 83970 ASSAY OF PARATHORMONE: CPT

## 2019-04-18 PROCEDURE — 74011250636 HC RX REV CODE- 250/636: Performed by: OTOLARYNGOLOGY

## 2019-04-18 PROCEDURE — 76010000174 HC OR TIME 3.5 TO 4 HR INTENSV-TIER 1: Performed by: OTOLARYNGOLOGY

## 2019-04-18 PROCEDURE — 77030010512 HC APPL CLP LIG J&J -C: Performed by: OTOLARYNGOLOGY

## 2019-04-18 PROCEDURE — 77030003029 HC SUT VCRL J&J -B: Performed by: OTOLARYNGOLOGY

## 2019-04-18 PROCEDURE — 77030011267 HC ELECTRD BLD COVD -A: Performed by: OTOLARYNGOLOGY

## 2019-04-18 PROCEDURE — 82310 ASSAY OF CALCIUM: CPT

## 2019-04-18 PROCEDURE — 77030031139 HC SUT VCRL2 J&J -A: Performed by: OTOLARYNGOLOGY

## 2019-04-18 PROCEDURE — 88305 TISSUE EXAM BY PATHOLOGIST: CPT

## 2019-04-18 PROCEDURE — 77030020782 HC GWN BAIR PAWS FLX 3M -B: Performed by: ANESTHESIOLOGY

## 2019-04-18 PROCEDURE — 74011250637 HC RX REV CODE- 250/637: Performed by: ANESTHESIOLOGY

## 2019-04-18 PROCEDURE — 77030016570 HC BLNKT BAIR HGGR 3M -B: Performed by: ANESTHESIOLOGY

## 2019-04-18 PROCEDURE — 77030032490 HC SLV COMPR SCD KNE COVD -B: Performed by: OTOLARYNGOLOGY

## 2019-04-18 PROCEDURE — 77030014008 HC SPNG HEMSTAT J&J -C: Performed by: OTOLARYNGOLOGY

## 2019-04-18 PROCEDURE — 36415 COLL VENOUS BLD VENIPUNCTURE: CPT

## 2019-04-18 PROCEDURE — 77030005401 HC CATH RAD ARRO -A: Performed by: ANESTHESIOLOGY

## 2019-04-18 PROCEDURE — 74011250636 HC RX REV CODE- 250/636: Performed by: ANESTHESIOLOGY

## 2019-04-18 PROCEDURE — 77030021678 HC GLIDESCP STAT DISP VERT -B: Performed by: ANESTHESIOLOGY

## 2019-04-18 PROCEDURE — 77030019908 HC STETH ESOPH SIMS -A: Performed by: ANESTHESIOLOGY

## 2019-04-18 PROCEDURE — 82962 GLUCOSE BLOOD TEST: CPT

## 2019-04-18 PROCEDURE — 77030039425 HC BLD LARYNG TRULITE DISP TELE -A: Performed by: ANESTHESIOLOGY

## 2019-04-18 PROCEDURE — 74011000250 HC RX REV CODE- 250

## 2019-04-18 PROCEDURE — 74011250637 HC RX REV CODE- 250/637: Performed by: OTOLARYNGOLOGY

## 2019-04-18 PROCEDURE — 77030002933 HC SUT MCRYL J&J -A: Performed by: OTOLARYNGOLOGY

## 2019-04-18 PROCEDURE — 86900 BLOOD TYPING SEROLOGIC ABO: CPT

## 2019-04-18 PROCEDURE — 76060000039 HC ANESTHESIA 4 TO 4.5 HR: Performed by: OTOLARYNGOLOGY

## 2019-04-18 PROCEDURE — 77030013794 HC KT TRNSDUC BLD EDWD -B: Performed by: ANESTHESIOLOGY

## 2019-04-18 PROCEDURE — 77030002996 HC SUT SLK J&J -A: Performed by: OTOLARYNGOLOGY

## 2019-04-18 PROCEDURE — 77030018836 HC SOL IRR NACL ICUM -A: Performed by: OTOLARYNGOLOGY

## 2019-04-18 RX ORDER — FENTANYL CITRATE 50 UG/ML
INJECTION, SOLUTION INTRAMUSCULAR; INTRAVENOUS AS NEEDED
Status: DISCONTINUED | OUTPATIENT
Start: 2019-04-18 | End: 2019-04-18 | Stop reason: HOSPADM

## 2019-04-18 RX ORDER — FERROUS SULFATE, DRIED 160(50) MG
1 TABLET, EXTENDED RELEASE ORAL
Status: DISCONTINUED | OUTPATIENT
Start: 2019-04-18 | End: 2019-04-19 | Stop reason: HOSPADM

## 2019-04-18 RX ORDER — ROCURONIUM BROMIDE 10 MG/ML
INJECTION, SOLUTION INTRAVENOUS AS NEEDED
Status: DISCONTINUED | OUTPATIENT
Start: 2019-04-18 | End: 2019-04-18 | Stop reason: HOSPADM

## 2019-04-18 RX ORDER — HEPARIN SODIUM 5000 [USP'U]/ML
5000 INJECTION, SOLUTION INTRAVENOUS; SUBCUTANEOUS EVERY 8 HOURS
Status: DISCONTINUED | OUTPATIENT
Start: 2019-04-18 | End: 2019-04-19 | Stop reason: HOSPADM

## 2019-04-18 RX ORDER — PROPOFOL 10 MG/ML
INJECTION, EMULSION INTRAVENOUS AS NEEDED
Status: DISCONTINUED | OUTPATIENT
Start: 2019-04-18 | End: 2019-04-18 | Stop reason: HOSPADM

## 2019-04-18 RX ORDER — DOXAZOSIN 4 MG/1
8 TABLET ORAL
Status: DISCONTINUED | OUTPATIENT
Start: 2019-04-18 | End: 2019-04-19 | Stop reason: HOSPADM

## 2019-04-18 RX ORDER — OXYCODONE AND ACETAMINOPHEN 10; 325 MG/1; MG/1
1 TABLET ORAL AS NEEDED
Status: DISCONTINUED | OUTPATIENT
Start: 2019-04-18 | End: 2019-04-18 | Stop reason: HOSPADM

## 2019-04-18 RX ORDER — MORPHINE SULFATE 2 MG/ML
2 INJECTION, SOLUTION INTRAMUSCULAR; INTRAVENOUS
Status: DISCONTINUED | OUTPATIENT
Start: 2019-04-18 | End: 2019-04-19 | Stop reason: HOSPADM

## 2019-04-18 RX ORDER — SODIUM CHLORIDE 0.9 % (FLUSH) 0.9 %
5-40 SYRINGE (ML) INJECTION EVERY 8 HOURS
Status: DISCONTINUED | OUTPATIENT
Start: 2019-04-18 | End: 2019-04-18 | Stop reason: HOSPADM

## 2019-04-18 RX ORDER — HYDROCODONE BITARTRATE AND ACETAMINOPHEN 5; 325 MG/1; MG/1
1 TABLET ORAL
Status: DISCONTINUED | OUTPATIENT
Start: 2019-04-18 | End: 2019-04-19 | Stop reason: HOSPADM

## 2019-04-18 RX ORDER — HYDROMORPHONE HYDROCHLORIDE 2 MG/ML
0.5 INJECTION, SOLUTION INTRAMUSCULAR; INTRAVENOUS; SUBCUTANEOUS
Status: DISCONTINUED | OUTPATIENT
Start: 2019-04-18 | End: 2019-04-18 | Stop reason: HOSPADM

## 2019-04-18 RX ORDER — SODIUM CHLORIDE 0.9 % (FLUSH) 0.9 %
5-40 SYRINGE (ML) INJECTION EVERY 8 HOURS
Status: DISCONTINUED | OUTPATIENT
Start: 2019-04-18 | End: 2019-04-19 | Stop reason: HOSPADM

## 2019-04-18 RX ORDER — DEXAMETHASONE SODIUM PHOSPHATE 4 MG/ML
INJECTION, SOLUTION INTRA-ARTICULAR; INTRALESIONAL; INTRAMUSCULAR; INTRAVENOUS; SOFT TISSUE AS NEEDED
Status: DISCONTINUED | OUTPATIENT
Start: 2019-04-18 | End: 2019-04-18 | Stop reason: HOSPADM

## 2019-04-18 RX ORDER — GLIPIZIDE 5 MG/1
10 TABLET ORAL 2 TIMES DAILY
Status: DISCONTINUED | OUTPATIENT
Start: 2019-04-18 | End: 2019-04-19 | Stop reason: HOSPADM

## 2019-04-18 RX ORDER — NALOXONE HYDROCHLORIDE 0.4 MG/ML
0.4 INJECTION, SOLUTION INTRAMUSCULAR; INTRAVENOUS; SUBCUTANEOUS AS NEEDED
Status: DISCONTINUED | OUTPATIENT
Start: 2019-04-18 | End: 2019-04-19 | Stop reason: HOSPADM

## 2019-04-18 RX ORDER — LISINOPRIL 5 MG/1
5 TABLET ORAL DAILY
Status: DISCONTINUED | OUTPATIENT
Start: 2019-04-19 | End: 2019-04-19 | Stop reason: HOSPADM

## 2019-04-18 RX ORDER — LIDOCAINE HYDROCHLORIDE AND EPINEPHRINE 10; 10 MG/ML; UG/ML
INJECTION, SOLUTION INFILTRATION; PERINEURAL AS NEEDED
Status: DISCONTINUED | OUTPATIENT
Start: 2019-04-18 | End: 2019-04-18 | Stop reason: HOSPADM

## 2019-04-18 RX ORDER — METFORMIN HYDROCHLORIDE 500 MG/1
500 TABLET ORAL
Status: DISCONTINUED | OUTPATIENT
Start: 2019-04-19 | End: 2019-04-19 | Stop reason: HOSPADM

## 2019-04-18 RX ORDER — OXYCODONE HYDROCHLORIDE 5 MG/1
5 TABLET ORAL
Status: DISCONTINUED | OUTPATIENT
Start: 2019-04-18 | End: 2019-04-18 | Stop reason: HOSPADM

## 2019-04-18 RX ORDER — ONDANSETRON 2 MG/ML
INJECTION INTRAMUSCULAR; INTRAVENOUS AS NEEDED
Status: DISCONTINUED | OUTPATIENT
Start: 2019-04-18 | End: 2019-04-18 | Stop reason: HOSPADM

## 2019-04-18 RX ORDER — FAMOTIDINE 20 MG/1
20 TABLET, FILM COATED ORAL DAILY
Status: DISCONTINUED | OUTPATIENT
Start: 2019-04-19 | End: 2019-04-19 | Stop reason: HOSPADM

## 2019-04-18 RX ORDER — SODIUM CHLORIDE 0.9 % (FLUSH) 0.9 %
5-40 SYRINGE (ML) INJECTION AS NEEDED
Status: DISCONTINUED | OUTPATIENT
Start: 2019-04-18 | End: 2019-04-18 | Stop reason: HOSPADM

## 2019-04-18 RX ORDER — DIPHENHYDRAMINE HCL 25 MG
25 CAPSULE ORAL
Status: DISCONTINUED | OUTPATIENT
Start: 2019-04-18 | End: 2019-04-19 | Stop reason: HOSPADM

## 2019-04-18 RX ORDER — SODIUM CHLORIDE, SODIUM LACTATE, POTASSIUM CHLORIDE, CALCIUM CHLORIDE 600; 310; 30; 20 MG/100ML; MG/100ML; MG/100ML; MG/100ML
100 INJECTION, SOLUTION INTRAVENOUS CONTINUOUS
Status: DISCONTINUED | OUTPATIENT
Start: 2019-04-18 | End: 2019-04-19 | Stop reason: HOSPADM

## 2019-04-18 RX ORDER — LIDOCAINE HYDROCHLORIDE 20 MG/ML
INJECTION, SOLUTION EPIDURAL; INFILTRATION; INTRACAUDAL; PERINEURAL AS NEEDED
Status: DISCONTINUED | OUTPATIENT
Start: 2019-04-18 | End: 2019-04-18 | Stop reason: HOSPADM

## 2019-04-18 RX ORDER — SODIUM CHLORIDE 0.9 % (FLUSH) 0.9 %
5-40 SYRINGE (ML) INJECTION AS NEEDED
Status: DISCONTINUED | OUTPATIENT
Start: 2019-04-18 | End: 2019-04-19 | Stop reason: HOSPADM

## 2019-04-18 RX ORDER — DOCUSATE SODIUM 100 MG/1
100 CAPSULE, LIQUID FILLED ORAL
Status: DISCONTINUED | OUTPATIENT
Start: 2019-04-18 | End: 2019-04-19 | Stop reason: HOSPADM

## 2019-04-18 RX ORDER — EPINEPHRINE 1 MG/ML
INJECTION INTRAMUSCULAR; INTRAVENOUS; SUBCUTANEOUS AS NEEDED
Status: DISCONTINUED | OUTPATIENT
Start: 2019-04-18 | End: 2019-04-18 | Stop reason: HOSPADM

## 2019-04-18 RX ORDER — SODIUM CHLORIDE, SODIUM LACTATE, POTASSIUM CHLORIDE, CALCIUM CHLORIDE 600; 310; 30; 20 MG/100ML; MG/100ML; MG/100ML; MG/100ML
75 INJECTION, SOLUTION INTRAVENOUS CONTINUOUS
Status: DISCONTINUED | OUTPATIENT
Start: 2019-04-18 | End: 2019-04-18 | Stop reason: HOSPADM

## 2019-04-18 RX ORDER — ONDANSETRON 2 MG/ML
4 INJECTION INTRAMUSCULAR; INTRAVENOUS
Status: DISCONTINUED | OUTPATIENT
Start: 2019-04-18 | End: 2019-04-19 | Stop reason: HOSPADM

## 2019-04-18 RX ORDER — GEMFIBROZIL 600 MG/1
600 TABLET, FILM COATED ORAL 2 TIMES DAILY
Status: DISCONTINUED | OUTPATIENT
Start: 2019-04-18 | End: 2019-04-19 | Stop reason: HOSPADM

## 2019-04-18 RX ADMIN — HYDROMORPHONE HYDROCHLORIDE 0.5 MG: 2 INJECTION, SOLUTION INTRAMUSCULAR; INTRAVENOUS; SUBCUTANEOUS at 16:22

## 2019-04-18 RX ADMIN — Medication 10 ML: at 22:15

## 2019-04-18 RX ADMIN — DOXAZOSIN 8 MG: 4 TABLET ORAL at 22:14

## 2019-04-18 RX ADMIN — ONDANSETRON 4 MG: 2 INJECTION INTRAMUSCULAR; INTRAVENOUS at 15:40

## 2019-04-18 RX ADMIN — SODIUM CHLORIDE, SODIUM LACTATE, POTASSIUM CHLORIDE, AND CALCIUM CHLORIDE 75 ML/HR: 600; 310; 30; 20 INJECTION, SOLUTION INTRAVENOUS at 10:24

## 2019-04-18 RX ADMIN — ROCURONIUM BROMIDE 50 MG: 10 INJECTION, SOLUTION INTRAVENOUS at 12:20

## 2019-04-18 RX ADMIN — SODIUM CHLORIDE, SODIUM LACTATE, POTASSIUM CHLORIDE, AND CALCIUM CHLORIDE: 600; 310; 30; 20 INJECTION, SOLUTION INTRAVENOUS at 15:32

## 2019-04-18 RX ADMIN — FENTANYL CITRATE 50 MCG: 50 INJECTION, SOLUTION INTRAMUSCULAR; INTRAVENOUS at 12:59

## 2019-04-18 RX ADMIN — PROPOFOL 150 MG: 10 INJECTION, EMULSION INTRAVENOUS at 12:20

## 2019-04-18 RX ADMIN — Medication 10 ML: at 18:00

## 2019-04-18 RX ADMIN — FENTANYL CITRATE 50 MCG: 50 INJECTION, SOLUTION INTRAMUSCULAR; INTRAVENOUS at 12:54

## 2019-04-18 RX ADMIN — HEPARIN SODIUM 5000 UNITS: 5000 INJECTION INTRAVENOUS; SUBCUTANEOUS at 23:15

## 2019-04-18 RX ADMIN — OXYCODONE HYDROCHLORIDE AND ACETAMINOPHEN 1 TABLET: 10; 325 TABLET ORAL at 16:56

## 2019-04-18 RX ADMIN — MORPHINE SULFATE 2 MG: 2 INJECTION, SOLUTION INTRAMUSCULAR; INTRAVENOUS at 22:15

## 2019-04-18 RX ADMIN — LIDOCAINE HYDROCHLORIDE 60 MG: 20 INJECTION, SOLUTION EPIDURAL; INFILTRATION; INTRACAUDAL; PERINEURAL at 12:20

## 2019-04-18 RX ADMIN — MORPHINE SULFATE 2 MG: 2 INJECTION, SOLUTION INTRAMUSCULAR; INTRAVENOUS at 17:48

## 2019-04-18 RX ADMIN — DEXAMETHASONE SODIUM PHOSPHATE 10 MG: 4 INJECTION, SOLUTION INTRA-ARTICULAR; INTRALESIONAL; INTRAMUSCULAR; INTRAVENOUS; SOFT TISSUE at 15:40

## 2019-04-18 RX ADMIN — FENTANYL CITRATE 100 MCG: 50 INJECTION, SOLUTION INTRAMUSCULAR; INTRAVENOUS at 12:20

## 2019-04-18 RX ADMIN — HYDROMORPHONE HYDROCHLORIDE 0.5 MG: 2 INJECTION, SOLUTION INTRAMUSCULAR; INTRAVENOUS; SUBCUTANEOUS at 16:34

## 2019-04-18 NOTE — PROGRESS NOTES
TRANSFER - IN REPORT: 
 
Verbal report received from Janes Daley RN(name) on Destiny Cary  being received from PACU 
(unit) for routine progression of care Report consisted of patients Situation, Background, Assessment and  
Recommendations(SBAR). Information from the following report(s) SBAR, Kardex, OR Summary and Procedure Summary was reviewed with the receiving nurse. Opportunity for questions and clarification was provided. Assessment completed upon patients arrival to unit and care assumed.

## 2019-04-18 NOTE — H&P
67 yo male who has had persistent hypercalcemia and was recently evaluated by Dr. Kiran Rodriguez- his [address] revealed a a R sup thyroid nodule and he also underwent FNA which was + for PTC. He has no personal or FH of thyroid cancer or other pathology. Denies any voice changes or swallowing problems but he does have long h/o severe GERD. He has some fatigue and h/o kidney stone as well. Also suffered a tibial fx last yr. DEXA scan revealed some osteopenia. Past Medical History:  
Diagnosis Date  Arrhythmia   
 bradycardia with pacemaker  Arthritis   
 general/ knees/ ankles/ wrists  Bradycardia  Cancer ([address] PROSTATE  Cardiac pacemaker 1/18/2016  Chest discomfort  Diabetes ([address] oral medication; 1/23/19 A1c 7.0; Avg fasting 150; denies history of hypo  Dyspnea  GERD (gastroesophageal reflux disease)   
 controlled with medications  Hyperlipidemia  Hypertension   
 controlled with med  Hypertrophy of prostate without urinary obstruction and other lower urinary tract symptoms (LUTS)  Impotence of organic origin  Microscopic hematuria  Nausea & vomiting   
 age 6- POV \"from chloroform\"  Personal history of prostate cancer  Rosacea  Shortness of breath 8/30/2016  
 10/2014: Normal echo  Thyroid nodule 3/7/2019  
 scheduled for thyroid removal 4/18/19 and will start Levothyroxine Past Surgical History:  
Procedure Laterality Date  BIOPSY PROSTATE  CARDIAC SURG PROCEDURE UNLIST  8/15 and 2/17  
 pacemaker  HX CARPAL TUNNEL RELEASE    
 right  HX HEMORRHOIDECTOMY  HX KNEE ARTHROSCOPY Right 07/2017  HX ORTHOPAEDIC Left   
 thumb surgery  HX ORTHOPAEDIC Right 12/2001  
 torn ligament off foot  HX ORTHOPAEDIC Left 09/2017  
 broke left leg  HX OTHER SURGICAL    
 sinus  HX OTHER SURGICAL    
 foot surgery  HX PACEMAKER  2015 and 2017  
 original in 2014 and device changed 2016  HX PROSTATECTOMY  2015 biopsy & radiation  HX TONSILLECTOMY    
 as a child  HX TUMOR REMOVAL    
 L thigh benign Social History Socioeconomic History  Marital status:  Spouse name: Not on file  Number of children: Not on file  Years of education: Not on file  Highest education level: Not on file Occupational History  Not on file Social Needs  Financial resource strain: Not on file  Food insecurity:  
  Worry: Not on file Inability: Not on file  Transportation needs:  
  Medical: Not on file Non-medical: Not on file Tobacco Use  Smoking status: Former Smoker Types: Cigars Last attempt to quit: 2001 Years since quittin.0  Smokeless tobacco: Current User  Tobacco comment: smoke cigars for 30 years now chews cigars Substance and Sexual Activity  Alcohol use: No  
 Drug use: No  
 Sexual activity: Not on file Lifestyle  Physical activity:  
  Days per week: Not on file Minutes per session: Not on file  Stress: Not on file Relationships  Social connections:  
  Talks on phone: Not on file Gets together: Not on file Attends Restorationist service: Not on file Active member of club or organization: Not on file Attends meetings of clubs or organizations: Not on file Relationship status: Not on file  Intimate partner violence:  
  Fear of current or ex partner: Not on file Emotionally abused: Not on file Physically abused: Not on file Forced sexual activity: Not on file Other Topics Concern  Not on file Social History Narrative  Not on file Family History Problem Relation Age of Onset  Cancer Mother  Hypertension Mother  Heart Disease Mother  Cancer Father  Heart Disease Father MI  
 Coronary Artery Disease Other   
     family history No Known Allergies No current facility-administered medications on file prior to encounter. Current Outpatient Medications on File Prior to Encounter Medication Sig Dispense Refill  levothyroxine (SYNTHROID) 175 mcg tablet Take 1 Tab by mouth Daily (before breakfast). Start after thyroid surgery 30 Tab 11  
 ergocalciferol (ERGOCALCIFEROL) 50,000 unit capsule 1 capsule 2 times a week x 6 weeks, then Day 1 and 15 of each month 12 Cap 11  
 ASA/dp-hydramine/sod bicarb/ca (JACQUIE-SELTZER PM PO) Take  by mouth.  calcium carbonate (CALCIUM 500 PO) Take  by mouth.  calcium carbonate (TUMS PO) Take  by mouth.  mag hydrox/aluminum hyd/simeth (ANTACID-ANTI GAS PO) Take  by mouth.  glipiZIDE (GLUCOTROL) 10 mg tablet Take 1 Tab by mouth two (2) times a day. 180 Tab 3  
 lisinopril (PRINIVIL, ZESTRIL) 5 mg tablet Take 1 Tab by mouth daily. 30 Tab 0  
 metFORMIN ER (GLUCOPHAGE XR) 750 mg tablet Take 1 Tab by mouth daily. 90 Tab 3  
 glucose blood VI test strips (ASCENSIA CONTOUR) strip by Does Not Apply route See Admin Instructions. Testing BS's daily as directed Dx E11.9 50 Strip 12  
 naproxen (NAPROSYN) 500 mg tablet Take 500 mg by mouth two (2) times daily as needed.  fluticasone (FLONASE) 50 mcg/actuation nasal spray 2 Sprays by Both Nostrils route daily. 1 Bottle 6  
 famotidine (PEPCID) 20 mg tablet Take 1 Tab by mouth nightly. 30 Tab 6  
 azelaic acid (FINACEA) 15 % topical gel Apply  to affected area daily.  doxycycline (VIBRAMYCIN) 100 mg capsule Take 100 mg by mouth daily as needed.  omeprazole (PRILOSEC) 40 mg capsule Take 40 mg by mouth daily as needed.  gemfibrozil (LOPID) 600 mg tablet Take 600 mg by mouth two (2) times a day.  cetirizine (ZYRTEC) 10 mg tablet Take 10 mg by mouth every morning.  doxazosin (CARDURA) 8 mg tablet Take 8 mg by mouth nightly. Indications: HYPERTENSION, SYMPTOMATIC BENIGN PROSTATIC HYPERPLASIA  multivitamin (ONE A DAY) tablet Take 1 Tab by mouth daily. EXAM: 
General: NAD, well-appearing Neuro: No gross neuro deficits. CN's II-XII intact. No facial weakness. Eyes: EOMI. Pupils reactive. No periorbital edema/ecchymosis. No nystagmus. Skin: No facial erythema, rashes or concerning lesions. Nose: No external deviations or saddling. Intranasally, septum is midline without perforations, nasal mucosa appears healthy with no erythema, mucopurulence, or polyps. Mouth: Moist mucus membranes, normal tongue/palate mobility, no concerning mucosal lesions. Oropharynx clear with no erythema/exudate, no tonsillar hypertrophy. Ears: Normal appearing auricles, no hematomas. EACs clear with no cerumen impaction, healthy canal skin, TM's intact with no perforations or retraction pockets. No middle ear effusions. Neck: Soft, supple, no palpable neck masses. No thyromegaly or palpable thyroid nodules. No surgical scars. Lymphatics: No palpable cervical LAD. Resp: No audible stridor or wheezing. CV: No murmurs. Normal rhythm. Extremities: No clubbing or cyanosis. 
  
Thyroid ultrasound 3/7/2019: Right lobe 1.69 x 1.66 x 4.54 cm.  In the superior right lobe there is a solid, heterogeneous nodule with both isoechoic and hypoechoic components measuring 0.83 x 1.09 x 1.15 cm containing peripheral blood flow and no obvious microcalcifications.  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  Isthmus measures 0.37 cm.  Left lobe 1.77 x 2.18 x 4.79 cm.  There is a cyst in the mid left lobe measuring 0.21 x 0.26 x 0.27 cm. Lab Results Component Value Date/Time Calcium 10.9 (H) 03/07/2019 12:13 PM  
 Phosphorus 2.5 03/07/2019 12:13 PM  
 PTH, Intact 67 (H) 03/07/2019 12:13 PM  
 
A/P: 
He has primary HPTH and his US suggested a poss R parathyroid adenoma. He also has a R superior thyroid nodule just > 1 cm that revealed PTC on recent FNA. I recommend total thyroidectomy and parathyroidectomy.  I discussed the risks of surgery including bleeding/hematoma, damage to recurrent laryngeal nerve w/ subsequent hoarseness or stridor, possible need for tracheostomy, hypocalcemia, need for lifetime thyroid supplementation, recurrence, and need for further procedures, and he would like to proceed Cynthia Stewart MD

## 2019-04-18 NOTE — ANESTHESIA POSTPROCEDURE EVALUATION
Procedure(s): 
PARATHYROIDECTOMY WITH INTRAOPERATIVE PTH MONITORING/NIMS THYROIDECTOMY/NIMS/  . 
 
general 
 
Anesthesia Post Evaluation Multimodal analgesia: multimodal analgesia used between 6 hours prior to anesthesia start to PACU discharge Patient location during evaluation: PACU Patient participation: complete - patient participated Level of consciousness: awake Pain management: adequate Airway patency: patent Anesthetic complications: no 
Cardiovascular status: acceptable and hemodynamically stable Respiratory status: acceptable Hydration status: acceptable Comments: Acceptable for discharge from PACU. Post anesthesia nausea and vomiting:  none Vitals Value Taken Time /68 4/18/2019  5:05 PM  
Temp Pulse 92 4/18/2019  5:05 PM  
Resp 15 4/18/2019  5:05 PM  
SpO2 95 % 4/18/2019  5:05 PM  
Vitals shown include unvalidated device data.

## 2019-04-18 NOTE — PROGRESS NOTES
04/18/19 1800 Dual Skin Pressure Injury Assessment Dual Skin Pressure Injury Assessment [facility] Second Care Provider (Based on [address] [place] [address]) Marcus Records

## 2019-04-18 NOTE — BRIEF OP NOTE
BRIEF OPERATIVE NOTE Date of Procedure: 4/18/2019 Preoperative Diagnosis: Primary hyperparathyroidism [address] [address] [E21.0] Papillary carcinoma of thyroid ([address] Sharita Ax Postoperative Diagnosis: Primary hyperparathyroidism ([address] [E21.0] Papillary carcinoma of thyroid ([address] Sharita Ax Procedure(s): 
PARATHYROIDECTOMY WITH INTRAOPERATIVE PTH MONITORING/NIMS THYROIDECTOMY/NIMS/   
 
Surgeon(s) and Role: Nadege Canas MD - Primary Surgical Staff: 
Circ-1: Laureano Celestin RN 
Circ-Cheryl Pinzon RN Scrub Tech-1: Antony Don Scrub Tech-2: Altagracia Quezada CNA Scrub Tech-Relief: Chinyere Hoover Event Time In Time Out Incision Start [id number] Incision Close Anesthesia: General  
 
IVF: 1100 cc Estimated Blood Loss: 15 cc Specimens:  
ID Type Source Tests Collected by Time Destination 2 : total thyroidectomy Preservative Thyroid  Chivo Story MD 4/18/2019 1520 Pathology 3 :  Right Superior Parathyroid Frozen Section Parathyroid  Chivo Story MD 4/18/2019 1349 Pathology Findings: R superior parathyroid adenoma- PTH decreased down to 14.9, R sup thyroid nodule Complications: none Implants: * No implants in log *

## 2019-04-18 NOTE — ANESTHESIA PREPROCEDURE EVALUATION
Relevant Problems No relevant active problems Anesthetic History PONV Review of Systems / Medical History Patient summary reviewed and pertinent labs reviewed Pulmonary Within defined limits Neuro/Psych Within defined limits Cardiovascular Hypertension: well controlled Dysrhythmias (Complete heart block) Pacemaker Exercise tolerance: >4 METS 
  
GI/Hepatic/Renal 
  
GERD: well controlled Endo/Other Diabetes: well controlled, type 2 Obesity and arthritis Other Findings Physical Exam 
 
Airway Mallampati: II 
TM Distance: > 6 cm Neck ROM: normal range of motion Mouth opening: Normal 
 
 Cardiovascular Rhythm: regular Rate: normal 
 
 
 
 Dental 
 
Dentition: Full lower dentures and Full upper dentures Pulmonary Breath sounds clear to auscultation Abdominal 
 
 
 
 Other Findings Anesthetic Plan ASA: 3 Anesthesia type: general 
 
Monitoring Plan: Arterial line Anesthetic plan and risks discussed with: Patient and Spouse

## 2019-04-18 NOTE — PERIOP NOTES
TRANSFER - OUT REPORT: 
 
Verbal report given to [address] [place] [address] Road on [place] [place]  being transferred to [facility] for routine post - op Report consisted of patients Situation, Background, Assessment and  
Recommendations(SBAR). Information from the following report(s) SBAR, OR Summary, Procedure Summary, Intake/Output and MAR was reviewed with the receiving nurse. Lines:  
Peripheral IV 04/18/19 Anterior;Right Forearm (Active) Site Assessment Clean, dry, & intact 4/18/2019  4:01 PM  
Phlebitis Assessment 0 4/18/2019  4:01 PM  
Infiltration Assessment 0 4/18/2019  4:01 PM  
Dressing Status Clean, dry, & intact 4/18/2019  4:01 PM  
Dressing Type Tape;Transparent 4/18/2019  4:01 PM  
Hub Color/Line Status Green; Infusing 4/18/2019  4:01 PM  
Action Taken Blood drawn 4/18/2019 10:40 AM  
Alcohol Cap Used No 4/18/2019  4:01 PM  
   
Arterial Line 04/18/19 Right Radial artery (Active) Site Assessment Clean, dry, & intact 4/18/2019  4:01 PM  
Dressing Status Clean, dry, & intact 4/18/2019  4:01 PM  
Dressing Type Tape;Transparent 4/18/2019  4:01 PM  
Line Status Intact and in place 4/18/2019  4:01 PM  
Treatment Arm board on;Zeroed or re-zeroed 4/18/2019  4:01 PM  
Affected Extremity/Extremities Pulses palpable;Color distal to insertion site pink (or appropriate for race) 4/18/2019  4:01 PM  
  
 
Opportunity for questions and clarification was provided. Patient transported with: 
 O2 @ 2 liters VTE prophylaxis orders have been written for [facility]. 
 
Patient and family given floor number and nurses name. Family updated re: pt status after security code verified.

## 2019-04-18 NOTE — ANESTHESIA PROCEDURE NOTES
Arterial Line Placement    Start time: 4/18/2019 12:14 PM  End time: 4/18/2019 12:18 PM  Performed by: Aliyah Perez MD  Authorized by: Aliyah Perez MD     Pre-Procedure  Indications:  Blood sampling  Preanesthetic Checklist: patient identified, risks and benefits discussed, anesthesia consent, site marked, patient being monitored, timeout performed and patient being monitored    Timeout Time: 12:13        Procedure:   Prep:  Chlorhexidine  Seldinger Technique?: Yes    Orientation:  Right  Location:  Radial artery  Catheter size:  20 G  Number of attempts:  1  Cont Cardiac Output Sensor: No      Assessment:   Post-procedure:  Line secured and sterile dressing applied  Patient Tolerance:  Patient tolerated the procedure well with no immediate complications

## 2019-04-19 VITALS
BODY MASS INDEX: 32.88 KG/M2 | HEART RATE: 72 BPM | WEIGHT: 203.7 LBS | SYSTOLIC BLOOD PRESSURE: 130 MMHG | RESPIRATION RATE: 18 BRPM | DIASTOLIC BLOOD PRESSURE: 76 MMHG | TEMPERATURE: 98.1 F | OXYGEN SATURATION: 98 %

## 2019-04-19 LAB
CALCIUM SERPL-MCNC: 10.2 MG/DL (ref 8.3–10.4)
GLUCOSE BLD STRIP.AUTO-MCNC: 148 MG/DL (ref 65–100)

## 2019-04-19 PROCEDURE — 36415 COLL VENOUS BLD VENIPUNCTURE: CPT

## 2019-04-19 PROCEDURE — 74011250636 HC RX REV CODE- 250/636: Performed by: OTOLARYNGOLOGY

## 2019-04-19 PROCEDURE — 82962 GLUCOSE BLOOD TEST: CPT

## 2019-04-19 PROCEDURE — 82310 ASSAY OF CALCIUM: CPT

## 2019-04-19 PROCEDURE — 74011250637 HC RX REV CODE- 250/637: Performed by: OTOLARYNGOLOGY

## 2019-04-19 PROCEDURE — 99218 HC RM OBSERVATION: CPT

## 2019-04-19 RX ADMIN — GLIPIZIDE 5 MG: 5 TABLET ORAL at 08:14

## 2019-04-19 RX ADMIN — GEMFIBROZIL 600 MG: 600 TABLET ORAL at 08:16

## 2019-04-19 RX ADMIN — CALCIUM CARBONATE-VITAMIN D TAB 500 MG-200 UNIT 1 TABLET: 500-200 TAB at 08:17

## 2019-04-19 RX ADMIN — HEPARIN SODIUM 5000 UNITS: 5000 INJECTION INTRAVENOUS; SUBCUTANEOUS at 06:06

## 2019-04-19 RX ADMIN — LEVOTHYROXINE SODIUM 175 MCG: 100 TABLET ORAL at 06:06

## 2019-04-19 RX ADMIN — HYDROCODONE BITARTRATE AND ACETAMINOPHEN 1 TABLET: 5; 325 TABLET ORAL at 06:12

## 2019-04-19 RX ADMIN — LISINOPRIL 5 MG: 5 TABLET ORAL at 08:19

## 2019-04-19 RX ADMIN — METFORMIN HYDROCHLORIDE 500 MG: 500 TABLET ORAL at 08:15

## 2019-04-19 RX ADMIN — FAMOTIDINE 20 MG: 20 TABLET ORAL at 08:17

## 2019-04-19 NOTE — PROGRESS NOTES
69 yo male POD 1 after parathyroidectomy and total thyroidectomy. Did well overnight. Still having some neck pain but throat pain has improved. No voice or swallowing issues. Denies any numbness/tingling of his extremities. Visit Vitals /76 Pulse 72 Temp 98.1 °F (36.7 °C) Resp 18 Wt 203 lb 11.2 oz (92.4 kg) SpO2 98% BMI 32.88 kg/m²  
 
-NAD, well-appearing 
-Normal voice- no stridor or hoarseness 
-Neck w/ steri-strips in place over incision- no hematomas or ecchymosis Ca: 10.7/10.2 A/P: PTC and primary HPTH- doing well after surgery yesterday. Ca is stable and he is stable for D/C home.  
 
[address]

## 2019-04-19 NOTE — PROGRESS NOTES
04/19/19 0500 Pain 1 Pain Scale 1 Numeric (0 - 10) Pain Intensity 1 5 Pain Location 1 Neck Pain Intervention(s) 1 Medication (see MAR)

## 2019-04-19 NOTE — PROGRESS NOTES
04/18/19 2215 Pain 1 Pain Scale 1 Numeric (0 - 10) Pain Intensity 1 7 Pain Location 1 Neck Pain Intervention(s) 1 Medication (see MAR)

## 2019-04-19 NOTE — DISCHARGE INSTRUCTIONS
Patient Education        Thyroidectomy: What to Expect at Home  Your Recovery    Thyroidectomy is the removal of the thyroid gland, which is shaped like a butterfly and lies across the windpipe (trachea). The gland makes hormones that control how your body makes and uses energy (metabolism). A doctor removes the gland when a tumor is present. The doctor may also remove the gland if you have an enlarged thyroid that is causing symptoms that bother you. Most tumors that grow in the thyroid gland are benign, meaning they are not cancer. You may leave the hospital with stitches in the cut (incision) the doctor made. Your doctor will tell you if you need to come back to have these removed. You may still have a tube called a drain in your neck. Your doctor will take this out a few days after your surgery. You may have some trouble chewing and swallowing after you go home. Your voice probably will be hoarse, and you may have trouble talking. For most people, these problems get better within 3 to 4 months, but it can take as long as a year. In some cases, this surgery causes permanent problems with chewing, speaking, or swallowing. This care sheet gives you a general idea about how long it will take for you to recover. But each person recovers at a different pace. Follow the steps below to get better as quickly as possible. How can you care for yourself at home? Activity    · Rest when you feel tired. Getting enough sleep will help you recover. When you lie down, put two or three pillows under your head to keep it raised.     · Try to walk each day. Start by walking a little more than you did the day before. Bit by bit, increase the amount you walk. Walking boosts blood flow and helps prevent pneumonia and constipation.     · Avoid strenuous physical activity and lifting heavy objects for 3 weeks after surgery or until your doctor says it is okay.     · Do not over-extend your neck backwards for 2 weeks after surgery.   · Ask your doctor when you can drive again.     · You may take a shower, unless you still have a drain near your incision. Pat the incision dry. If you have a drain, follow your doctor's instructions to care for it. Diet    · If it is painful to swallow, start out with cold drinks, flavored ice pops, and ice cream. Next, try soft foods like pudding, yogurt, canned or cooked fruit, scrambled eggs, and mashed potatoes. Avoid eating hard or scratchy foods like chips or raw vegetables. Avoid orange or tomato juice and other acidic foods that can sting the throat.     · If you cough right after drinking, try drinking thicker liquids, such as a smoothie.     · You may notice that your bowel movements are not regular right after your surgery. This is common. Try to avoid constipation and straining with bowel movements. You may want to take a fiber supplement every day. If you have not had a bowel movement after a couple of days, ask your doctor about taking a mild laxative. Medicines    · Your doctor will tell you if and when you can restart your medicines. He or she will also give you instructions about taking any new medicines.     · If you take blood thinners, such as warfarin (Coumadin), clopidogrel (Plavix), or aspirin, be sure to talk to your doctor. He or she will tell you if and when to start taking those medicines again. Make sure that you understand exactly what your doctor wants you to do.     · Be safe with medicines. Take pain medicines exactly as directed. ? If the doctor gave you a prescription medicine for pain, take it as prescribed. ? If you are not taking a prescription pain medicine, ask your doctor if you can take an over-the-counter medicine.     · If you think your pain medicine is making you sick to your stomach:  ? Take your medicine after meals (unless your doctor has told you not to). ?  Ask your doctor for a different pain medicine.     · Your doctor may prescribe calcium to prevent problems after surgery from low calcium. Not having enough calcium can cause symptoms such as tingling around your mouth or in your hands and feet.     · Your doctor may have prescribed antibiotics. Take them as directed. Do not stop taking them just because you feel better. You need to take the full course of antibiotics. Incision care    · If your doctor told you how to care for your incision, follow your doctor's instructions. If you did not get instructions, follow this general advice:  ? After the first 24 to 48 hours, wash around the wound with clean water 2 times a day. Don't use hydrogen peroxide or alcohol, which can slow healing.     · You may have a drain near your incision. Your doctor will tell you how to take care of it. Follow-up care is a key part of your treatment and safety. Be sure to make and go to all appointments, and call your doctor if you are having problems. It's also a good idea to know your test results and keep a list of the medicines you take. When should you call for help? Call 911 anytime you think you may need emergency care. For example, call if:    · You passed out (lost consciousness).     · You have sudden chest pain and shortness of breath, or you cough up blood.     · You have severe trouble breathing.    Call your doctor now or seek immediate medical care if:    · You have loose stitches, or your incision comes open.     · Bleeding from your incision soaks through your bandages.     · You have signs of infection, such as:  ? Increased pain, swelling, warmth, or redness. ? Red streaks leading from the incision. ? Pus draining from the incision. ?  A fever.     · You have a tingling feeling around your mouth.     · You have cramping or tingling in your hands and feet.    Watch closely for changes in your health, and be sure to contact your doctor if:    · You have trouble talking.     · You are sick to your stomach or cannot keep fluids down.     · You do not have a bowel movement after taking a laxative. Where can you learn more? Go to http://audrey-lavinia.info/. Enter [id number] in the search box to learn more about \"Thyroidectomy: What to Expect at Home. \"  Current as of: March 14, 2018  Content Version: 11.9  © 7169-0677 [employer]. Care instructions adapted under license by [employer] (which disclaims liability or warranty for this information). If you have questions about a medical condition or this instruction, always ask your healthcare professional. [address] any warranty or liability for your use of this information. DISCHARGE SUMMARY from Nurse    PATIENT INSTRUCTIONS:    After general anesthesia or intravenous sedation, for 24 hours or while taking prescription Narcotics:  · Limit your activities  · Do not drive and operate hazardous machinery  · Do not make important personal or business decisions  · Do  not drink alcoholic beverages  · If you have not urinated within 8 hours after discharge, please contact your surgeon on call. Report the following to your surgeon:  · Excessive pain, swelling, redness or odor of or around the surgical area  · Temperature over 100.5  · Nausea and vomiting lasting longer than 4 hours or if unable to take medications  · Any signs of decreased circulation or nerve impairment to extremity: change in color, persistent  numbness, tingling, coldness or increase pain  · Any questions    What to do at Home:  Recommended activity: Activity as tolerated. If you experience any of the following symptoms:  Fever greater than 100.5,  Hoarseness,   swelling in surgical area,  Redness/ warmth or drainage from incision area,  please follow up with your doctor. *  Please give a list of your current medications to your Primary Care Provider.     *  Please update this list whenever your medications are discontinued, doses are      changed, or new medications (including over-the-counter products) are added. *  Please carry medication information at all times in case of emergency situations. These are general instructions for a healthy lifestyle:    No smoking/ No tobacco products/ Avoid exposure to second hand smoke  Surgeon General's Warning:  Quitting smoking now greatly reduces serious risk to your health. Obesity, smoking, and sedentary lifestyle greatly increases your risk for illness    A healthy diet, regular physical exercise & weight monitoring are important for maintaining a healthy lifestyle    You may be retaining fluid if you have a history of heart failure or if you experience any of the following symptoms:  Weight gain of 3 pounds or more overnight or 5 pounds in a week, increased swelling in our hands or feet or shortness of breath while lying flat in bed. Please call your doctor as soon as you notice any of these symptoms; do not wait until your next office visit. Recognize signs and symptoms of STROKE:    F-face looks uneven    A-arms unable to move or move unevenly    S-speech slurred or non-existent    T-time-call 911 as soon as signs and symptoms begin-DO NOT go       Back to bed or wait to see if you get better-TIME IS BRAIN. Warning Signs of HEART ATTACK     Call 911 if you have these symptoms:   Chest discomfort. Most heart attacks involve discomfort in the center of the chest that lasts more than a few minutes, or that goes away and comes back. It can feel like uncomfortable pressure, squeezing, fullness, or pain.  Discomfort in other areas of the upper body. Symptoms can include pain or discomfort in one or both arms, the back, neck, jaw, or stomach.  Shortness of breath with or without chest discomfort.  Other signs may include breaking out in a cold sweat, nausea, or lightheadedness. Don't wait more than five minutes to call 911 - MINUTES MATTER! Fast action can save your life.  Calling 911 is almost always the fastest way to get lifesaving treatment. Emergency Medical Services staff can begin treatment when they arrive -- up to an hour sooner than if someone gets to the hospital by car. The discharge information has been reviewed with the patient. The patient verbalized understanding. Discharge medications reviewed with the patient and appropriate educational materials and side effects teaching were provided. ___________________________________________________________________________________________________________________________________-There are steri-strips in place over your neck incision. These will be removed at your post-op visit.  You may shower/bathe as long as these steri-strips remain out of the water.  -No strenuous activity or heavy lifting for 2 weeks  -Please start w/ soft foods and advance to a regular diet

## 2019-04-19 NOTE — DISCHARGE SUMMARY
Ear/Nose/Throat Discharge Summary      Patient ID:  Salvador Underwood  [id number]  46 y.o.  1945    Allergies: Patient has no known allergies. Admit Date: 4/18/2019    Discharge Date: 4/19/2019     Admitting Physician: Darleen Byrd MD     Discharge Physician: Saint Ducking    * Admission Diagnoses:   1. Primary hyperparathyroidism  2. Papillary thyroid carcinoma    * Discharge Diagnoses:   1. Primary hyperparathyroidism  2. Papillary thyroid carcinoma    Admission Condition: good    * Discharged Condition: good    [facility] Course: Admitted after undergoing total thyroidectomy and parathyroidectomy in OR on 4/18/19. Did well in PACU and then to 2nd floor. He had some neck and throat pain post-op but no other issues. Ca was stable and decreased as expected- 10.7/10.2. Seen on morning of POD 1 and deemed stable for D/C home. * Procedures:   Procedure(s):  PARATHYROIDECTOMY WITH INTRAOPERATIVE PTH MONITORING/NIMS  THYROIDECTOMY/NIMS/        Consults: None    Significant Diagnostic Studies: routine Ca checks    Treatments: post-op pain control and IV hydration    Discharge Exam:  -NAD, well-appearing  -Neck w/ steri-strips over incision, no hematomas or ecchymosis  -Strong voice- no stridor or hoarseness  -No palpable cervical LAD    * Disposition: Home    Discharge Medications:   Current Discharge Medication List      CONTINUE these medications which have NOT CHANGED    Details   ergocalciferol (ERGOCALCIFEROL) 50,000 unit capsule 1 capsule 2 times a week x 6 weeks, then Day 1 and 15 of each month  Qty: 12 Cap, Refills: 11      glipiZIDE (GLUCOTROL) 10 mg tablet Take 1 Tab by mouth two (2) times a day. Qty: 180 Tab, Refills: 3    Associated Diagnoses: Type 2 diabetes mellitus without complication, without long-term current use of insulin (HCC)      lisinopril (PRINIVIL, ZESTRIL) 5 mg tablet Take 1 Tab by mouth daily.   Qty: 30 Tab, Refills: 0      metFORMIN ER (GLUCOPHAGE XR) 750 mg tablet Take 1 Tab by mouth daily. Qty: 90 Tab, Refills: 3      naproxen (NAPROSYN) 500 mg tablet Take 500 mg by mouth two (2) times daily as needed. fluticasone (FLONASE) 50 mcg/actuation nasal spray 2 Sprays by Both Nostrils route daily. Qty: 1 Bottle, Refills: 6    Associated Diagnoses: Allergic rhinitis, unspecified seasonality, unspecified trigger      famotidine (PEPCID) 20 mg tablet Take 1 Tab by mouth nightly. Qty: 30 Tab, Refills: 6    Associated Diagnoses: Gastroesophageal reflux disease without esophagitis      doxycycline (VIBRAMYCIN) 100 mg capsule Take 100 mg by mouth daily as needed. omeprazole (PRILOSEC) 40 mg capsule Take 40 mg by mouth daily as needed. gemfibrozil (LOPID) 600 mg tablet Take 600 mg by mouth two (2) times a day. cetirizine (ZYRTEC) 10 mg tablet Take 10 mg by mouth every morning. doxazosin (CARDURA) 8 mg tablet Take 8 mg by mouth nightly. Indications: HYPERTENSION, SYMPTOMATIC BENIGN PROSTATIC HYPERPLASIA      levothyroxine (SYNTHROID) 175 mcg tablet Take 1 Tab by mouth Daily (before breakfast). Start after thyroid surgery  Qty: 30 Tab, Refills: 11    Associated Diagnoses: Papillary thyroid carcinoma (HCC)      ASA/dp-hydramine/sod bicarb/ca (JACQUIE-SELTZER PM PO) Take  by mouth.      calcium carbonate (CALCIUM 500 PO) Take  by mouth.      calcium carbonate (TUMS PO) Take  by mouth.      mag hydrox/aluminum hyd/simeth (ANTACID-ANTI GAS PO) Take  by mouth. glucose blood VI test strips (ASCENSIA CONTOUR) strip by Does Not Apply route See Admin Instructions. Testing BS's daily as directed Dx E11.9  Qty: 50 Strip, Refills: 12      azelaic acid (FINACEA) 15 % topical gel Apply  to affected area daily. multivitamin (ONE A DAY) tablet Take 1 Tab by mouth daily. STOP taking these medications       calcium carbonate-vitamin D3 500mg (1,250mg) -600 unit tab Comments:   Reason for Stopping:               * Follow-up Care/Patient Instructions:    Activity: No lifting, Driving, or Strenuous exercise for 1 wk    Diet: Regular Diet    Wound Care: Keep wound clean and dry- you may shower/bathe as long as the steri strips stay out of the water    POST-OP APPT- Dr. Adama Dorantes- 4/24/19 @ [id number]      Signed:  Lucio Hernandez MD  4/19/2019  7:46 AM

## 2019-04-19 NOTE — OP NOTES
[facility]  OPERATIVE REPORT    Name:  Rebecca Salamanca  MR#:  [id number]  :  1945  ACCOUNT #:  [de-identified]  DATE OF SERVICE:  2019    PREOPERATIVE DIAGNOSES:  1. Primary hyperparathyroidism. 2.  Papillary thyroid carcinoma. POSTOPERATIVE DIAGNOSES:  1. Primary hyperparathyroidism. 2.  Right superior parathyroid adenoma. 3.  Papillary thyroid carcinoma. PROCEDURE PERFORMED:  1. Parathyroidectomy. 2.  Total thyroidectomy. SURGEON:  Magy Royal MD    ANESTHESIA:  General endotracheal.    ANESTHESIOLOGIST:  Hawa Duran MD    COMPLICATIONS:  None. SPECIMENS REMOVED:  1. Right superior parathyroid gland - frozen. 2.  Total thyroidectomy - permanent. ESTIMATED BLOOD LOSS:  15 mL. IV FLUIDS:  1100 mL crystalloid. DRAINS:  None. DISPOSITION:  PACU, then third floor. CONDITION:  Stable. OPERATIVE FINDINGS:  1. There was an enlarged right superior parathyroid gland, consistent with parathyroid adenoma. The intraoperative PTH level decreased from 93.3 preoperatively down to 14.9 after excision of the adenoma. The left superior parathyroid gland was also slightly enlarged and hypervascular, but it was left in place as the intra-operative PTH had already decreased down to normal after removal of the right superior gland. 2.  There was a firm 1-cm nodule within the right superior pole of the thyroid gland, consistent with his known papillary thyroid carcinoma. Total thyroidectomy was performed. 3.  Both recurrent laryngeal nerves were identified, carefully dissected out and stimulated strongly at the end of the case. 4.  The left inferior and left superior parathyroid glands were identified and preserved. I could not identify the right inferior parathyroid gland. BRIEF HISTORY:  The patient is a 71-year-old male with a history of hypercalcemia.   This was worked up by Dr. Janeen Zamudio in Endocrinology and he was diagnosed with likely primary hyperparathyroidism. He had suffered a recent bony fracture and his DEXA scan revealed osteopenia. He was worked up with an ultrasound which suggested a right inferior parathyroid adenoma. A sestamibi scan was inconclusive. His thyroid ultrasound also revealed a 1 cm nodule within the right superior pole of the thyroid gland. He underwent ultrasound-guided fine needle aspirate which was positive for papillary thyroid carcinoma. Therefore, the decision was made to take him to the operating room for parathyroidectomy and total thyroidectomy. DESCRIPTION OF PROCEDURE:  The patient was brought back to the operating room, placed on the table in a supine position. A general endotracheal anesthesia was inducted without any complications. A [employer] recurrent laryngeal nerve monitoring tube was placed and its position was confirmed within the glottis using the GlideScope. Once the patient was adequately sedated, a total of 10 mL of 1% lidocaine with 1:100,000 epinephrine was injected along the planned incision line. He was then sterilely prepped and draped in usual fashion. I began by designing a 4-cm incision along the natural neck skin crease, approximately 2 fingerbreadths above the sternal notch. I incised through the skin and dermis with a #15 blade and then dissected through some subcutaneous fat and platysma muscle using Bovie electrocautery. Next, superior and inferiorly based subplatysmal skin flaps were raised for improved exposure. I dissected down along the midline raphe and lateralized the strap muscles. Both anterior jugular veins were carefully preserved. I dissected down onto the thyroid isthmus and then I began my dissection of the right thyroid lobe. I dissected deep to the strap muscles to expose the entire right thyroid lobe. On palpation, there was a firm nodule within the right superior pole, consistent with preoperative radiographic findings.   There was no evidence of any extracapsular spread or strap muscle invasion. I dissected laterally all the way to the carotid artery. The middle thyroid vein was ligated and divided. Next, I continued my dissection along the superior pole. I developed an avascular dissection plane just medial to the superior pole then I grasped the superior pole with a Barbara clamp and carefully ligated and divided the superior vascular pedicle. I continued my dissection along the superior and lateral aspect of the gland. I then moved inferolaterally. I ligated and divided the inferior thyroid vein. I stayed tight to the thyroid capsule but could not identify the right inferior parathyroid gland. I continued my dissection along the lateral aspect of the gland. There was a small tubercle of Zuckerkandl which was carefully dissected out. Just lateral to the tubercle was an enlarged right superior parathyroid gland which was concerning for possible adenoma. This was carefully dissected out and passed off for frozen section analysis. I continued dissecting just lateral to the tubercle, and with retraction of the gland from lateral to medial, I was able to identify the right recurrent laryngeal nerve distally near its insertion at the cricothyroid joint. I carefully dissected out the course of the nerve. Anesthesia had drawn an initial parathyroid hormone level which returned at 93.3. I had them draw subsequent levels 10, 20 and 30 minutes after excision of this likely adenoma. I dissected across Black's ligament while protecting the underlying right recurrent laryngeal nerve. I continued my dissection onto the anterior tracheal wall. Surgical Pathology called and confirmed that the tissue removed was indeed hypercellular parathyroid tissue. While awaiting post-excision PTH results, I continued my dissection on the left side.   I developed a dissection plane just deep to the strap muscles and dissected laterally all the way to the carotid artery. There were no palpable nodules on the left side. I ligated and divided the middle thyroid vein. I then moved superiorly and after grasping the superior pole with the Samson clamp, I carefully ligated and divided the superior vascular pedicle. I continued my dissection along the thyroid capsule superiorly and laterally. There was a small tubercle on the left side as well. I then moved inferolaterally and ligated and divided the inferior thyroid vein. I identified the left inferior parathyroid gland which was normal on palpation and it was carefully dissected away from the thyroid capsule. I continued dissection along the tubercle and then while retracting the gland from lateral to medial, I identified the left recurrent laryngeal nerve distally near its insertion at the cricothyroid joint. I did identify the left superior parathyroid gland which was slightly enlarged and hypervascular but I left it in place while awaiting the final PTH levels. The lab called and the subsequent three parathyroid hormone levels were 69.9, 57.4 and finally 14.9. As the PTH had dropped to within normal limits, no further parathyroid dissection was required. I carefully dissected out the course of the left recurrent laryngeal nerve and then dissected across Black's ligament and onto the anterior tracheal wall. I completed dissection across the anterior tracheal wall and note that total thyroid specimen was removed en bloc. It was passed off for permanent pathology with the suture on the left superior pole for pathologic orientation. I irrigated out the wound bed and ensured adequate hemostasis using bipolar electrocautery. I re-stimulated both recurrent laryngeal nerves which stimulated strongly. I then reapproximated the strap muscles in the midline with a running 3-0 Vicryl suture.   The incision was then closed in layers using 3-0 undyed Vicryl deep suture for the platysma and dermis followed by 5-0 running subcutaneous Monocryl suture for the skin. Mastisol and Steri-Strips were placed over the incision. This concluded the surgical portion of the procedure. The patient was then awakened from anesthesia, extubated and taken to PACU in stable condition afterwards.         Bravo James MD      [id number]/  D:  04/18/2019 16:02  T:  04/19/2019 23:06  JOB #:  [id number]

## 2019-04-19 NOTE — PROGRESS NOTES
Shift assessment complete via doc flow sheet. All needs met at this time. Staff will monitor with hourly rounds. 04/18/19 1944 Psychosocial  
Psychosocial (WDL) WDL Purposeful Interaction Yes Pt Identified Daily Priority Clinical issues (comment) [facility] Process Attend basic human needs Caring Interventions Reassure Reassure Caring rounds

## 2019-07-15 ENCOUNTER — APPOINTMENT (RX ONLY)
Dept: URBAN - METROPOLITAN AREA CLINIC 24 | Facility: CLINIC | Age: 74
Setting detail: DERMATOLOGY
End: 2019-07-15

## 2019-07-15 DIAGNOSIS — L70.8 OTHER ACNE: ICD-10-CM

## 2019-07-15 DIAGNOSIS — L738 OTHER SPECIFIED DISEASES OF HAIR AND HAIR FOLLICLES: ICD-10-CM

## 2019-07-15 DIAGNOSIS — L663 OTHER SPECIFIED DISEASES OF HAIR AND HAIR FOLLICLES: ICD-10-CM

## 2019-07-15 DIAGNOSIS — D18.0 HEMANGIOMA: ICD-10-CM

## 2019-07-15 DIAGNOSIS — L73.9 FOLLICULAR DISORDER, UNSPECIFIED: ICD-10-CM

## 2019-07-15 DIAGNOSIS — Z87.2 PERSONAL HISTORY OF DISEASES OF THE SKIN AND SUBCUTANEOUS TISSUE: ICD-10-CM

## 2019-07-15 DIAGNOSIS — L82.1 OTHER SEBORRHEIC KERATOSIS: ICD-10-CM

## 2019-07-15 DIAGNOSIS — D22 MELANOCYTIC NEVI: ICD-10-CM

## 2019-07-15 DIAGNOSIS — L91.8 OTHER HYPERTROPHIC DISORDERS OF THE SKIN: ICD-10-CM

## 2019-07-15 PROBLEM — L02.223 FURUNCLE OF CHEST WALL: Status: ACTIVE | Noted: 2019-07-15

## 2019-07-15 PROBLEM — L40.0 PSORIASIS VULGARIS: Status: ACTIVE | Noted: 2019-07-15

## 2019-07-15 PROBLEM — D18.01 HEMANGIOMA OF SKIN AND SUBCUTANEOUS TISSUE: Status: ACTIVE | Noted: 2019-07-15

## 2019-07-15 PROBLEM — D22.5 MELANOCYTIC NEVI OF TRUNK: Status: ACTIVE | Noted: 2019-07-15

## 2019-07-15 PROCEDURE — ? ACNE SURGERY

## 2019-07-15 PROCEDURE — ? COUNSELING

## 2019-07-15 PROCEDURE — 11200 RMVL SKIN TAGS UP TO&INC 15: CPT | Mod: 59

## 2019-07-15 PROCEDURE — 10040 EXTRACTION: CPT

## 2019-07-15 PROCEDURE — 11300 SHAVE SKIN LESION 0.5 CM/<: CPT

## 2019-07-15 PROCEDURE — ? SKIN TAG REMOVAL MULTI

## 2019-07-15 PROCEDURE — 99214 OFFICE O/P EST MOD 30 MIN: CPT | Mod: 25

## 2019-07-15 PROCEDURE — ? SHAVE REMOVAL

## 2019-07-15 ASSESSMENT — LOCATION DETAILED DESCRIPTION DERM
LOCATION DETAILED: LEFT MEDIAL INFERIOR CHEST
LOCATION DETAILED: LEFT MEDIAL UPPER BACK
LOCATION DETAILED: RIGHT POSTERIOR SHOULDER
LOCATION DETAILED: RIGHT ANTERIOR PROXIMAL THIGH
LOCATION DETAILED: LEFT MEDIAL TRAPEZIAL NECK
LOCATION DETAILED: RIGHT MEDIAL SUPERIOR CHEST
LOCATION DETAILED: RIGHT ANTERIOR MEDIAL PROXIMAL THIGH
LOCATION DETAILED: LEFT PROXIMAL PRETIBIAL REGION
LOCATION DETAILED: STERNUM
LOCATION DETAILED: LEFT MEDIAL KNEE
LOCATION DETAILED: EPIGASTRIC SKIN
LOCATION DETAILED: LEFT ANTERIOR MEDIAL PROXIMAL THIGH
LOCATION DETAILED: RIGHT SUPERIOR UPPER BACK
LOCATION DETAILED: LEFT INFERIOR MEDIAL UPPER BACK

## 2019-07-15 ASSESSMENT — LOCATION ZONE DERM
LOCATION ZONE: NECK
LOCATION ZONE: ARM
LOCATION ZONE: TRUNK
LOCATION ZONE: LEG

## 2019-07-15 ASSESSMENT — LOCATION SIMPLE DESCRIPTION DERM
LOCATION SIMPLE: RIGHT UPPER BACK
LOCATION SIMPLE: RIGHT THIGH
LOCATION SIMPLE: LEFT THIGH
LOCATION SIMPLE: LEFT PRETIBIAL REGION
LOCATION SIMPLE: LEFT UPPER BACK
LOCATION SIMPLE: POSTERIOR NECK
LOCATION SIMPLE: LEFT KNEE
LOCATION SIMPLE: ABDOMEN
LOCATION SIMPLE: CHEST
LOCATION SIMPLE: RIGHT SHOULDER

## 2019-07-15 NOTE — PROCEDURE: SKIN TAG REMOVAL MULTI
Add Associated Diagnoses If Applicable When Selecting Medical Necessity: Yes
Medical Necessity Information: It is in your best interest to select a reason for this procedure from the list below. All of these items fulfill various CMS LCD requirements except the new and changing color options.
Detail Level: Detailed
Total Number Of Lesions Treated: 8
Hemostasis: Aluminum Chloride
Include Z78.9 (Other Specified Conditions Influencing Health Status) As An Associated Diagnosis?: No
Consent: Written consent obtained and the risks of skin tag removal was reviewed with the patient including but not limited to bleeding, pigmentary change, infection, pain, and remote possibility of scarring.
Anesthesia Volume In Cc: 0.5
Anesthesia Type: 1% lidocaine without epinephrine and a 1:10 solution of 8.4% sodium bicarbonate
Medical Necessity Clause: This procedure was medically necessary because the lesions that were treated were

## 2019-07-15 NOTE — PROCEDURE: ACNE SURGERY
Extraction Method: lancet and extractor
Post-Care Instructions: I reviewed with the patient in detail post-care instructions. Patient is to keep the treatment areaas dry overnight, and then apply bacitracin twice daily until healed. Patient may apply hydrogen peroxide soaks to remove any crusting.
Detail Level: Detailed
Prep Text (Optional): Prior to removal the treatment areas were prepped in the usual fashion.
Acne Type: Milial cysts
Render Number Of Lesions Treated: no
Consent was obtained and risks were reviewed including but not limited to scarring, infection, bleeding, scabbing, incomplete removal, and allergy to anesthesia.

## 2019-07-15 NOTE — PROCEDURE: SHAVE REMOVAL
Billing Type: Third-Party Bill
Hemostasis: Aluminum Chloride
Bill For Surgical Tray: no
Biopsy Method: double edge Personna blade
Anesthesia Volume In Cc: 0.5
Was A Bandage Applied: Yes
Anesthesia Type: 1% lidocaine without epinephrine and a 1:10 solution of 8.4% sodium bicarbonate
X Size Of Lesion In Cm (Optional): 0
Wound Care: Petrolatum
Medical Necessity Clause: This procedure was medically necessary because the lesion is
Consent was obtained from the patient. The risks and benefits to therapy were discussed in detail. Specifically, the risks of infection, scarring, bleeding, prolonged wound healing, incomplete removal, allergy to anesthesia, nerve injury and recurrence were addressed. Prior to the procedure, the treatment site was clearly identified and confirmed by the patient. All components of Universal Protocol/PAUSE Rule completed.
Post-Care Instructions: I reviewed with the patient in detail post-care instructions. Patient is to keep the biopsy site dry overnight, and then apply bacitracin twice daily until healed. Patient may apply hydrogen peroxide soaks to remove any crusting.
Medical Necessity Information: It is in your best interest to select a reason for this procedure from the list below. All of these items fulfill various CMS LCD requirements except the new and changing color options.
Notification Instructions: Patient will be notified of biopsy results. However, patient instructed to call the office if not contacted within 2 weeks.
Path Notes (To The Dermatopathologist): Please check margins.
Detail Level: Detailed

## 2019-07-31 PROBLEM — E55.9 VITAMIN D DEFICIENCY: Status: ACTIVE | Noted: 2019-07-31

## 2019-07-31 PROBLEM — E83.52 HYPERCALCEMIA: Status: RESOLVED | Noted: 2018-08-23 | Resolved: 2019-07-31

## 2019-07-31 PROBLEM — M85.80 OSTEOPENIA: Status: ACTIVE | Noted: 2019-07-31

## 2019-07-31 PROBLEM — E89.0 POSTSURGICAL HYPOTHYROIDISM: Status: ACTIVE | Noted: 2019-07-31

## 2020-01-22 PROBLEM — E66.01 SEVERE OBESITY (BMI 35.0-39.9) WITH COMORBIDITY (HCC): Status: RESOLVED | Noted: 2018-04-17 | Resolved: 2020-01-22

## 2020-05-22 ENCOUNTER — HOSPITAL ENCOUNTER (OUTPATIENT)
Dept: ULTRASOUND IMAGING | Age: 75
Discharge: HOME | End: 2020-05-22
Attending: INTERNAL MEDICINE
Payer: MEDICARE

## 2020-05-22 DIAGNOSIS — C73 PAPILLARY THYROID CARCINOMA (HCC): ICD-10-CM

## 2020-05-22 PROCEDURE — 76536 US EXAM OF HEAD AND NECK: CPT

## 2020-11-17 PROBLEM — Z86.39 HISTORY OF PRIMARY HYPERPARATHYROIDISM: Status: ACTIVE | Noted: 2019-01-23

## 2021-01-14 ENCOUNTER — APPOINTMENT (RX ONLY)
Dept: URBAN - METROPOLITAN AREA CLINIC 23 | Facility: CLINIC | Age: 76
Setting detail: DERMATOLOGY
End: 2021-01-14

## 2021-01-14 DIAGNOSIS — L57.8 OTHER SKIN CHANGES DUE TO CHRONIC EXPOSURE TO NONIONIZING RADIATION: ICD-10-CM | Status: STABLE

## 2021-01-14 DIAGNOSIS — L08.89 OTHER SPECIFIED LOCAL INFECTIONS OF THE SKIN AND SUBCUTANEOUS TISSUE: ICD-10-CM

## 2021-01-14 DIAGNOSIS — L57.0 ACTINIC KERATOSIS: ICD-10-CM

## 2021-01-14 DIAGNOSIS — Z87.2 PERSONAL HISTORY OF DISEASES OF THE SKIN AND SUBCUTANEOUS TISSUE: ICD-10-CM

## 2021-01-14 DIAGNOSIS — L71.8 OTHER ROSACEA: ICD-10-CM | Status: INADEQUATELY CONTROLLED

## 2021-01-14 DIAGNOSIS — Z71.89 OTHER SPECIFIED COUNSELING: ICD-10-CM

## 2021-01-14 PROBLEM — M12.9 ARTHROPATHY, UNSPECIFIED: Status: ACTIVE | Noted: 2021-01-14

## 2021-01-14 PROBLEM — J30.1 ALLERGIC RHINITIS DUE TO POLLEN: Status: ACTIVE | Noted: 2021-01-14

## 2021-01-14 PROCEDURE — 17000 DESTRUCT PREMALG LESION: CPT

## 2021-01-14 PROCEDURE — ? COUNSELING

## 2021-01-14 PROCEDURE — ? PRESCRIPTION MEDICATION MANAGEMENT

## 2021-01-14 PROCEDURE — ? LIQUID NITROGEN

## 2021-01-14 PROCEDURE — ? SUNSCREEN RECOMMENDATIONS

## 2021-01-14 PROCEDURE — 99214 OFFICE O/P EST MOD 30 MIN: CPT | Mod: 25

## 2021-01-14 ASSESSMENT — LOCATION SIMPLE DESCRIPTION DERM
LOCATION SIMPLE: RIGHT UPPER BACK
LOCATION SIMPLE: NOSE
LOCATION SIMPLE: SCALP
LOCATION SIMPLE: LEFT FOREHEAD
LOCATION SIMPLE: RIGHT SHOULDER

## 2021-01-14 ASSESSMENT — LOCATION ZONE DERM
LOCATION ZONE: ARM
LOCATION ZONE: NOSE
LOCATION ZONE: SCALP
LOCATION ZONE: FACE
LOCATION ZONE: TRUNK

## 2021-01-14 ASSESSMENT — LOCATION DETAILED DESCRIPTION DERM
LOCATION DETAILED: LEFT INFERIOR LATERAL FOREHEAD
LOCATION DETAILED: LEFT CENTRAL FRONTAL SCALP
LOCATION DETAILED: RIGHT POSTERIOR SHOULDER
LOCATION DETAILED: NASAL SUPRATIP
LOCATION DETAILED: RIGHT SUPERIOR UPPER BACK

## 2021-01-14 NOTE — PROCEDURE: PRESCRIPTION MEDICATION MANAGEMENT
Plan: The medication is  but the patient declined Rx today. He stated that he has 1 full tube left.
Continue Regimen: Metrocream
Render In Strict Bullet Format?: No
Detail Level: Zone

## 2021-05-17 ENCOUNTER — HOSPITAL ENCOUNTER (OUTPATIENT)
Dept: MAMMOGRAPHY | Age: 76
Discharge: HOME | End: 2021-05-17
Attending: INTERNAL MEDICINE
Payer: MEDICARE

## 2021-05-17 DIAGNOSIS — Z86.39 HISTORY OF PRIMARY HYPERPARATHYROIDISM: ICD-10-CM

## 2021-05-17 DIAGNOSIS — M85.89 OSTEOPENIA OF MULTIPLE SITES: ICD-10-CM

## 2021-05-17 DIAGNOSIS — E55.9 VITAMIN D DEFICIENCY: ICD-10-CM

## 2021-05-17 PROCEDURE — 77080 DXA BONE DENSITY AXIAL: CPT

## 2022-01-13 ENCOUNTER — HOSPITAL ENCOUNTER (OUTPATIENT)
Dept: LAB | Age: 77
Discharge: HOME | End: 2022-01-13
Payer: MEDICARE

## 2022-01-13 DIAGNOSIS — C61 MALIGNANT NEOPLASM OF PROSTATE (HCC): ICD-10-CM

## 2022-01-13 DIAGNOSIS — E11.9 TYPE 2 DIABETES MELLITUS WITHOUT COMPLICATION, WITHOUT LONG-TERM CURRENT USE OF INSULIN (HCC): ICD-10-CM

## 2022-01-13 LAB
ALBUMIN SERPL-MCNC: 3.5 G/DL (ref 3.2–4.6)
ALBUMIN/GLOB SERPL: 1 {RATIO} (ref 1.2–3.5)
ALP SERPL-CCNC: 55 U/L (ref 50–136)
ALT SERPL-CCNC: 14 U/L (ref 12–65)
ANION GAP SERPL CALC-SCNC: 8 MMOL/L (ref 7–16)
AST SERPL-CCNC: 20 U/L (ref 15–37)
BILIRUB SERPL-MCNC: 0.3 MG/DL (ref 0.2–1.1)
BUN SERPL-MCNC: 18 MG/DL (ref 8–23)
CALCIUM SERPL-MCNC: 9 MG/DL (ref 8.3–10.4)
CHLORIDE SERPL-SCNC: 104 MMOL/L (ref 98–107)
CHOLEST SERPL-MCNC: 161 MG/DL
CO2 SERPL-SCNC: 25 MMOL/L (ref 21–32)
CREAT SERPL-MCNC: 0.97 MG/DL (ref 0.8–1.5)
EST. AVERAGE GLUCOSE BLD GHB EST-MCNC: 163 MG/DL
GLOBULIN SER CALC-MCNC: 3.6 G/DL (ref 2.3–3.5)
GLUCOSE SERPL-MCNC: 176 MG/DL (ref 65–100)
HBA1C MFR BLD: 7.3 % (ref 4.2–6.3)
HDLC SERPL-MCNC: 36 MG/DL (ref 40–60)
HDLC SERPL: 4.5 {RATIO}
LDLC SERPL CALC-MCNC: ABNORMAL MG/DL
LDLC SERPL DIRECT ASSAY-MCNC: 72 MG/DL
POTASSIUM SERPL-SCNC: 4 MMOL/L (ref 3.5–5.1)
PROT SERPL-MCNC: 7.1 G/DL (ref 6.3–8.2)
SODIUM SERPL-SCNC: 137 MMOL/L (ref 138–145)
TRIGL SERPL-MCNC: 416 MG/DL (ref 35–150)
VLDLC SERPL CALC-MCNC: 83.2 MG/DL (ref 6–23)

## 2022-01-13 PROCEDURE — 80061 LIPID PANEL: CPT

## 2022-01-13 PROCEDURE — 36415 COLL VENOUS BLD VENIPUNCTURE: CPT

## 2022-01-13 PROCEDURE — 80053 COMPREHEN METABOLIC PANEL: CPT

## 2022-01-13 PROCEDURE — 84153 ASSAY OF PSA TOTAL: CPT

## 2022-01-13 PROCEDURE — 83721 ASSAY OF BLOOD LIPOPROTEIN: CPT

## 2022-01-13 PROCEDURE — 83036 HEMOGLOBIN GLYCOSYLATED A1C: CPT

## 2022-01-14 LAB — PSA SERPL DL<=0.01 NG/ML-MCNC: 0.08 NG/ML (ref 0–4)

## 2022-01-14 NOTE — PROGRESS NOTES
Trig higher at 416 as expected -over 500 it is treated so still no gemfibrozil--the LDL 72-since is diabetic low dose statin would be st -use 20mg pravastatin for this(helps trig also)-A1C 7.3 up 0.2 -watch diet better and no med change yet

## 2022-01-20 ENCOUNTER — APPOINTMENT (RX ONLY)
Dept: URBAN - METROPOLITAN AREA CLINIC 24 | Facility: CLINIC | Age: 77
Setting detail: DERMATOLOGY
End: 2022-01-20

## 2022-01-20 DIAGNOSIS — L82.1 OTHER SEBORRHEIC KERATOSIS: ICD-10-CM

## 2022-01-20 DIAGNOSIS — L72.0 EPIDERMAL CYST: ICD-10-CM

## 2022-01-20 DIAGNOSIS — D18.0 HEMANGIOMA: ICD-10-CM

## 2022-01-20 DIAGNOSIS — L57.8 OTHER SKIN CHANGES DUE TO CHRONIC EXPOSURE TO NONIONIZING RADIATION: ICD-10-CM

## 2022-01-20 DIAGNOSIS — Z87.2 PERSONAL HISTORY OF DISEASES OF THE SKIN AND SUBCUTANEOUS TISSUE: ICD-10-CM

## 2022-01-20 PROBLEM — I10 ESSENTIAL (PRIMARY) HYPERTENSION: Status: ACTIVE | Noted: 2022-01-20

## 2022-01-20 PROBLEM — D18.01 HEMANGIOMA OF SKIN AND SUBCUTANEOUS TISSUE: Status: ACTIVE | Noted: 2022-01-20

## 2022-01-20 PROCEDURE — ? COUNSELING

## 2022-01-20 PROCEDURE — 10040 EXTRACTION: CPT

## 2022-01-20 PROCEDURE — ? SUNSCREEN RECOMMENDATIONS

## 2022-01-20 PROCEDURE — 99213 OFFICE O/P EST LOW 20 MIN: CPT | Mod: 25

## 2022-01-20 PROCEDURE — ? ACNE SURGERY

## 2022-01-20 ASSESSMENT — LOCATION DETAILED DESCRIPTION DERM
LOCATION DETAILED: LEFT CLAVICULAR NECK
LOCATION DETAILED: RIGHT POSTERIOR SHOULDER
LOCATION DETAILED: RIGHT SUPERIOR UPPER BACK
LOCATION DETAILED: LEFT CENTRAL PARIETAL SCALP
LOCATION DETAILED: LEFT LATERAL CANTHUS
LOCATION DETAILED: SUPERIOR THORACIC SPINE

## 2022-01-20 ASSESSMENT — LOCATION SIMPLE DESCRIPTION DERM
LOCATION SIMPLE: RIGHT SHOULDER
LOCATION SIMPLE: LEFT ANTERIOR NECK
LOCATION SIMPLE: SCALP
LOCATION SIMPLE: LEFT EYELID
LOCATION SIMPLE: UPPER BACK
LOCATION SIMPLE: RIGHT UPPER BACK

## 2022-01-20 ASSESSMENT — LOCATION ZONE DERM
LOCATION ZONE: EYELID
LOCATION ZONE: NECK
LOCATION ZONE: SCALP
LOCATION ZONE: ARM
LOCATION ZONE: TRUNK

## 2022-01-20 NOTE — PROCEDURE: ACNE SURGERY
Post-Care Instructions: I reviewed with the patient in detail post-care instructions. Patient is to keep the treatment areaas dry overnight, and then apply bacitracin twice daily until healed. Patient may apply hydrogen peroxide soaks to remove any crusting.
Acne Type: Comedonal Lesions
Render Post-Care Instructions In Note?: no
Detail Level: Detailed
Consent was obtained and risks were reviewed including but not limited to scarring, infection, bleeding, scabbing, incomplete removal, and allergy to anesthesia.
Prep Text (Optional): Prior to removal the treatment areas were prepped in the usual fashion.
Extraction Method: lancet and extractor

## 2022-03-18 PROBLEM — Z86.39 HISTORY OF PRIMARY HYPERPARATHYROIDISM: Status: ACTIVE | Noted: 2019-01-23

## 2022-03-18 PROBLEM — E55.9 VITAMIN D DEFICIENCY: Status: ACTIVE | Noted: 2019-07-31

## 2022-03-19 PROBLEM — L71.9 ROSACEA: Status: ACTIVE | Noted: 2018-04-17

## 2022-03-19 PROBLEM — E89.0 POSTSURGICAL HYPOTHYROIDISM: Status: ACTIVE | Noted: 2019-07-31

## 2022-03-19 PROBLEM — C73 PAPILLARY THYROID CARCINOMA (HCC): Status: ACTIVE | Noted: 2019-03-07

## 2022-03-19 PROBLEM — M85.80 OSTEOPENIA: Status: ACTIVE | Noted: 2019-07-31

## 2022-03-19 PROBLEM — K21.9 GASTROESOPHAGEAL REFLUX DISEASE WITHOUT ESOPHAGITIS: Status: ACTIVE | Noted: 2018-04-12

## 2022-03-19 PROBLEM — R09.81 NASAL CONGESTION: Status: ACTIVE | Noted: 2018-03-02

## 2022-03-19 PROBLEM — I50.22 SYSTOLIC CHF, CHRONIC (HCC): Status: ACTIVE | Noted: 2017-02-15

## 2022-04-18 ENCOUNTER — HOSPITAL ENCOUNTER (OUTPATIENT)
Dept: ULTRASOUND IMAGING | Age: 77
Discharge: HOME | End: 2022-04-18
Attending: INTERNAL MEDICINE
Payer: MEDICARE

## 2022-04-18 DIAGNOSIS — C73 PAPILLARY THYROID CARCINOMA (HCC): ICD-10-CM

## 2022-04-18 PROCEDURE — 76536 US EXAM OF HEAD AND NECK: CPT

## 2022-06-09 DIAGNOSIS — C73 PAPILLARY THYROID CARCINOMA (HCC): ICD-10-CM

## 2022-06-09 DIAGNOSIS — Z86.39 HISTORY OF PRIMARY HYPERPARATHYROIDISM: ICD-10-CM

## 2022-06-09 DIAGNOSIS — E55.9 VITAMIN D DEFICIENCY: ICD-10-CM

## 2022-06-09 DIAGNOSIS — E89.0 POSTSURGICAL HYPOTHYROIDISM: Primary | ICD-10-CM

## 2022-07-05 ENCOUNTER — TELEPHONE (OUTPATIENT)
Dept: INTERNAL MEDICINE CLINIC | Facility: CLINIC | Age: 77
End: 2022-07-05

## 2022-07-05 RX ORDER — PRAVASTATIN SODIUM 20 MG
20 TABLET ORAL DAILY
Qty: 90 TABLET | Refills: 3 | Status: SHIPPED | OUTPATIENT
Start: 2022-07-05 | End: 2022-09-07 | Stop reason: SDUPTHER

## 2022-07-07 DIAGNOSIS — C73 PAPILLARY THYROID CARCINOMA (HCC): ICD-10-CM

## 2022-07-07 DIAGNOSIS — E55.9 VITAMIN D DEFICIENCY: ICD-10-CM

## 2022-07-07 DIAGNOSIS — Z86.39 HISTORY OF PRIMARY HYPERPARATHYROIDISM: ICD-10-CM

## 2022-07-07 DIAGNOSIS — E89.0 POSTSURGICAL HYPOTHYROIDISM: ICD-10-CM

## 2022-07-07 LAB
25(OH)D3 SERPL-MCNC: 55.2 NG/ML (ref 30–100)
ANION GAP SERPL CALC-SCNC: 11 MMOL/L (ref 7–16)
BUN SERPL-MCNC: 16 MG/DL (ref 8–23)
CALCIUM SERPL-MCNC: 9.3 MG/DL (ref 8.3–10.4)
CALCIUM SERPL-MCNC: 9.4 MG/DL (ref 8.3–10.4)
CHLORIDE SERPL-SCNC: 101 MMOL/L (ref 98–107)
CO2 SERPL-SCNC: 24 MMOL/L (ref 21–32)
CREAT SERPL-MCNC: 1.1 MG/DL (ref 0.8–1.5)
GLUCOSE SERPL-MCNC: 196 MG/DL (ref 65–100)
POTASSIUM SERPL-SCNC: 3.9 MMOL/L (ref 3.5–5.1)
PTH-INTACT SERPL-MCNC: 36 PG/ML (ref 18.5–88)
SODIUM SERPL-SCNC: 136 MMOL/L (ref 138–145)
TSH W FREE THYROID IF ABNORMAL: 0.59 UIU/ML (ref 0.36–3.74)

## 2022-07-08 LAB
THYROGLOB AB SERPL-ACNC: <1 IU/ML (ref 0–0.9)
THYROGLOBULIN: 0.4 NG/ML (ref 1.4–29.2)

## 2022-07-13 ENCOUNTER — OFFICE VISIT (OUTPATIENT)
Dept: INTERNAL MEDICINE CLINIC | Facility: CLINIC | Age: 77
End: 2022-07-13
Payer: MEDICARE

## 2022-07-13 VITALS
BODY MASS INDEX: 35.68 KG/M2 | HEIGHT: 66 IN | SYSTOLIC BLOOD PRESSURE: 138 MMHG | HEART RATE: 72 BPM | WEIGHT: 222 LBS | DIASTOLIC BLOOD PRESSURE: 82 MMHG

## 2022-07-13 DIAGNOSIS — C61 MALIGNANT NEOPLASM OF PROSTATE (HCC): ICD-10-CM

## 2022-07-13 DIAGNOSIS — E78.5 DYSLIPIDEMIA: ICD-10-CM

## 2022-07-13 DIAGNOSIS — E11.9 TYPE 2 DIABETES MELLITUS WITHOUT COMPLICATION, WITHOUT LONG-TERM CURRENT USE OF INSULIN (HCC): Primary | ICD-10-CM

## 2022-07-13 DIAGNOSIS — E11.9 TYPE 2 DIABETES MELLITUS WITHOUT COMPLICATION, WITHOUT LONG-TERM CURRENT USE OF INSULIN (HCC): ICD-10-CM

## 2022-07-13 DIAGNOSIS — Z95.0 BIVENTRICULAR CARDIAC PACEMAKER IN SITU: ICD-10-CM

## 2022-07-13 DIAGNOSIS — C73 PAPILLARY THYROID CARCINOMA (HCC): ICD-10-CM

## 2022-07-13 DIAGNOSIS — E89.0 POSTSURGICAL HYPOTHYROIDISM: ICD-10-CM

## 2022-07-13 LAB
CHOLEST SERPL-MCNC: 110 MG/DL
EST. AVERAGE GLUCOSE BLD GHB EST-MCNC: 203 MG/DL
HBA1C MFR BLD: 8.7 % (ref 4.8–5.6)
HDLC SERPL-MCNC: 41 MG/DL (ref 40–60)
HDLC SERPL: 2.7 {RATIO}
LDLC SERPL CALC-MCNC: 19 MG/DL
TRIGL SERPL-MCNC: 250 MG/DL (ref 35–150)
VLDLC SERPL CALC-MCNC: 50 MG/DL (ref 6–23)

## 2022-07-13 PROCEDURE — 1123F ACP DISCUSS/DSCN MKR DOCD: CPT | Performed by: INTERNAL MEDICINE

## 2022-07-13 PROCEDURE — 3017F COLORECTAL CA SCREEN DOC REV: CPT | Performed by: INTERNAL MEDICINE

## 2022-07-13 PROCEDURE — G8427 DOCREV CUR MEDS BY ELIG CLIN: HCPCS | Performed by: INTERNAL MEDICINE

## 2022-07-13 PROCEDURE — G8419 CALC BMI OUT NRM PARAM NOF/U: HCPCS | Performed by: INTERNAL MEDICINE

## 2022-07-13 PROCEDURE — 3051F HG A1C>EQUAL 7.0%<8.0%: CPT | Performed by: INTERNAL MEDICINE

## 2022-07-13 PROCEDURE — 99213 OFFICE O/P EST LOW 20 MIN: CPT | Performed by: INTERNAL MEDICINE

## 2022-07-13 PROCEDURE — 4004F PT TOBACCO SCREEN RCVD TLK: CPT | Performed by: INTERNAL MEDICINE

## 2022-07-13 PROCEDURE — 2022F DILAT RTA XM EVC RTNOPTHY: CPT | Performed by: INTERNAL MEDICINE

## 2022-07-13 RX ORDER — CARVEDILOL 25 MG/1
TABLET, FILM COATED ORAL
COMMUNITY
Start: 2022-05-13

## 2022-07-13 RX ORDER — PERPHENAZINE 16 MG/1
TABLET, FILM COATED ORAL
COMMUNITY
Start: 2022-02-02

## 2022-07-13 ASSESSMENT — PATIENT HEALTH QUESTIONNAIRE - PHQ9
1. LITTLE INTEREST OR PLEASURE IN DOING THINGS: 0
SUM OF ALL RESPONSES TO PHQ9 QUESTIONS 1 & 2: 0
SUM OF ALL RESPONSES TO PHQ QUESTIONS 1-9: 0
2. FEELING DOWN, DEPRESSED OR HOPELESS: 0
SUM OF ALL RESPONSES TO PHQ QUESTIONS 1-9: 0

## 2022-07-13 ASSESSMENT — ENCOUNTER SYMPTOMS
SHORTNESS OF BREATH: 0
COUGH: 0

## 2022-07-13 NOTE — PROGRESS NOTES
SUBJECTIVE:   Faith Hollis is a 77 y.o. male seen for a visit regarding   Chief Complaint   Patient presents with    Cholesterol Problem     6 month f/u-fasting    Hypertension    Diabetes        Hypertension  This is a chronic problem. The current episode started more than 1 year ago. The problem is unchanged. The problem is controlled. Pertinent negatives include no chest pain, palpitations or shortness of breath. Diabetes  He presents for his follow-up diabetic visit. He has type 2 diabetes mellitus. His disease course has been stable. Pertinent negatives for diabetes include no chest pain. (Checks qd -random - am 130-150--can be 180 later if ate--A1C 7.3 in Jan)   Hyperlipidemia  This is a chronic problem. The current episode started more than 1 year ago. Exacerbating diseases include diabetes. Pertinent negatives include no chest pain, myalgias or shortness of breath. Current antihyperlipidemic treatment includes statins (pravastatin started in Jan-trig 400 -direct LDL at 72). Past Medical History, Past Surgical History, Family history, Social History, and Medications were all reviewed with the patient today and updated as necessary.        Current Outpatient Medications   Medication Sig Dispense Refill    CONTOUR TEST strip USE STRIP TO CHECK GLUCOSE ONCE DAILY      blood glucose test strips (CONTOUR NEXT TEST) strip Testing BS one time a day as directed, E11.9      pravastatin (PRAVACHOL) 20 MG tablet Take 1 tablet by mouth daily 90 tablet 3    cetirizine (ZYRTEC) 10 MG tablet Take 10 mg by mouth      doxazosin (CARDURA) 8 MG tablet TAKE 1 TABLET EVERY NIGHT FOR HIGH BLOOD PRESSURE AND ENLARGED PROSTATE WITH URINATION PROBLEM      ergocalciferol (ERGOCALCIFEROL) 1.25 MG (27180 UT) capsule Take 50,000 Units by mouth every 7 days      glipiZIDE (GLUCOTROL) 10 MG tablet 2 in the AM and 1 at HS      levothyroxine (SYNTHROID) 150 MCG tablet 1 tablet once a day except for 1/2 tablet on Sundays      lisinopril (PRINIVIL;ZESTRIL) 5 MG tablet TAKE 1 TABLET EVERY DAY      omeprazole (PRILOSEC) 40 MG delayed release capsule Take 40 mg by mouth daily      pioglitazone (ACTOS) 30 MG tablet Take 30 mg by mouth daily       No current facility-administered medications for this visit. Allergies   Allergen Reactions    Metformin And Related Diarrhea     Patient Active Problem List   Diagnosis    Vitamin D deficiency    Dyslipidemia    History of primary hyperparathyroidism    Nasal congestion    Postsurgical hypothyroidism    Impotence of organic origin    Gastroesophageal reflux disease without esophagitis    Type II diabetes mellitus (HCC)    Papillary thyroid carcinoma (HCC)    Rosacea    Systolic CHF, chronic (HCC)    Symptomatic bradycardia    Elevated prostate specific antigen (PSA)    Biventricular cardiac pacemaker in situ    Malignant neoplasm of prostate ([facility] [facility]    Osteopenia    Hypertension     Social History     Tobacco Use    Smoking status: Former Smoker     Quit date: 2001     Years since quittin.2    Smokeless tobacco: Current User    Tobacco comment: Quit smoking: smoke cigars for 30 years now chews cigars   Substance Use Topics    Alcohol use: No          Review of Systems   Respiratory: Negative for cough and shortness of breath. Cardiovascular: Negative for chest pain and palpitations. Genitourinary: Negative for difficulty urinating and dysuria. Prostate caner several years ago-had RT--psa 0.78 2022   Musculoskeletal: Negative for myalgias. OBJECTIVE:  /82   Pulse 72   Ht 5' 6\" (1.676 m)   Wt 222 lb (100.7 kg)   BMI 35.83 kg/m²      Physical Exam  Constitutional:       General: He is not in acute distress. Appearance: Normal appearance. Cardiovascular:      Rate and Rhythm: Normal rate and regular rhythm. Heart sounds: No murmur heard. Pulmonary:      Breath sounds: No wheezing or rales. Medical problems and test results were reviewed with the patient today. ASSESSMENT and PLAN     1. Type 2 diabetes mellitus without complication, without long-term current use of insulin (HCC)  -     Lipid Panel; Future  -     Hemoglobin A1C; Future  2. Papillary thyroid carcinoma ([facility] [address]  3. Postsurgical hypothyroidism  4. Biventricular cardiac pacemaker in situ  5. Malignant neoplasm of prostate ([facility] [address]  6. Dyslipidemia  -     Lipid Panel; Future     Check lipid on statin ; also A1C -he got labs but lab done as endo labs by the lab--last psa ok   Current treatment plan is effective. no changes in therapy  lab results and schedule for future lab studies reviewed with patient  Cardiovascular risk and specific lipid/ LDL goals reviewed  reviewed medications and side effects in detail     No follow-ups on file.

## 2022-07-19 ENCOUNTER — TELEPHONE (OUTPATIENT)
Dept: INTERNAL MEDICINE CLINIC | Facility: CLINIC | Age: 77
End: 2022-07-19

## 2022-07-20 ENCOUNTER — TELEPHONE (OUTPATIENT)
Dept: INTERNAL MEDICINE CLINIC | Facility: CLINIC | Age: 77
End: 2022-07-20

## 2022-07-21 NOTE — TELEPHONE ENCOUNTER
Spoke with patient and [facility] and cancelled the Dearing Ciro and sent Jardiance 10mg to [facility]

## 2022-08-18 PROCEDURE — 93294 REM INTERROG EVL PM/LDLS PM: CPT | Performed by: INTERNAL MEDICINE

## 2022-08-18 PROCEDURE — 93296 REM INTERROG EVL PM/IDS: CPT | Performed by: INTERNAL MEDICINE

## 2022-09-02 ENCOUNTER — TELEPHONE (OUTPATIENT)
Dept: INTERNAL MEDICINE CLINIC | Facility: CLINIC | Age: 77
End: 2022-09-02

## 2022-09-02 NOTE — TELEPHONE ENCOUNTER
Patient called this afternoon(after 4pm) stating the medication that was sent in for him 7/21 for DM was also over $500 and needs something in it's place/TD

## 2022-09-06 RX ORDER — GLIPIZIDE 10 MG/1
TABLET ORAL
Qty: 270 TABLET | Refills: 3 | Status: SHIPPED | OUTPATIENT
Start: 2022-09-06

## 2022-09-07 RX ORDER — PRAVASTATIN SODIUM 20 MG
20 TABLET ORAL DAILY
Qty: 90 TABLET | Refills: 3 | Status: SHIPPED | OUTPATIENT
Start: 2022-09-07

## 2022-09-07 RX ORDER — PIOGLITAZONEHYDROCHLORIDE 30 MG/1
30 TABLET ORAL DAILY
Qty: 90 TABLET | Refills: 3 | Status: SHIPPED | OUTPATIENT
Start: 2022-09-07

## 2022-10-05 DIAGNOSIS — Z95.0 BIVENTRICULAR CARDIAC PACEMAKER IN SITU: ICD-10-CM

## 2022-10-05 DIAGNOSIS — R00.1 SYMPTOMATIC BRADYCARDIA: ICD-10-CM

## 2022-10-05 DIAGNOSIS — I50.22 SYSTOLIC CHF, CHRONIC (HCC): ICD-10-CM

## 2022-10-05 DIAGNOSIS — R00.1 SYMPTOMATIC BRADYCARDIA: Primary | ICD-10-CM

## 2022-11-07 DIAGNOSIS — I10 ESSENTIAL (PRIMARY) HYPERTENSION: Primary | ICD-10-CM

## 2022-11-09 RX ORDER — LISINOPRIL 5 MG/1
TABLET ORAL
Qty: 90 TABLET | Refills: 4 | Status: SHIPPED | OUTPATIENT
Start: 2022-11-09

## 2022-11-09 RX ORDER — DOXAZOSIN 8 MG/1
TABLET ORAL
Qty: 90 TABLET | Refills: 4 | Status: SHIPPED | OUTPATIENT
Start: 2022-11-09

## 2022-11-16 ENCOUNTER — OFFICE VISIT (OUTPATIENT)
Dept: CARDIOLOGY CLINIC | Age: 77
End: 2022-11-16
Payer: MEDICARE

## 2022-11-16 VITALS
DIASTOLIC BLOOD PRESSURE: 82 MMHG | WEIGHT: 225 LBS | BODY MASS INDEX: 36.16 KG/M2 | HEART RATE: 80 BPM | HEIGHT: 66 IN | SYSTOLIC BLOOD PRESSURE: 138 MMHG

## 2022-11-16 DIAGNOSIS — E11.65 TYPE 2 DIABETES MELLITUS WITH HYPERGLYCEMIA, WITHOUT LONG-TERM CURRENT USE OF INSULIN (HCC): ICD-10-CM

## 2022-11-16 DIAGNOSIS — E11.9 TYPE 2 DIABETES MELLITUS WITHOUT COMPLICATION, WITHOUT LONG-TERM CURRENT USE OF INSULIN (HCC): ICD-10-CM

## 2022-11-16 DIAGNOSIS — I50.22 SYSTOLIC CHF, CHRONIC (HCC): Primary | ICD-10-CM

## 2022-11-16 DIAGNOSIS — I10 PRIMARY HYPERTENSION: ICD-10-CM

## 2022-11-16 DIAGNOSIS — E78.5 DYSLIPIDEMIA: ICD-10-CM

## 2022-11-16 DIAGNOSIS — Z95.0 BIVENTRICULAR CARDIAC PACEMAKER IN SITU: ICD-10-CM

## 2022-11-16 PROCEDURE — 3052F HG A1C>EQUAL 8.0%<EQUAL 9.0%: CPT | Performed by: INTERNAL MEDICINE

## 2022-11-16 PROCEDURE — G8484 FLU IMMUNIZE NO ADMIN: HCPCS | Performed by: INTERNAL MEDICINE

## 2022-11-16 PROCEDURE — 1123F ACP DISCUSS/DSCN MKR DOCD: CPT | Performed by: INTERNAL MEDICINE

## 2022-11-16 PROCEDURE — 4004F PT TOBACCO SCREEN RCVD TLK: CPT | Performed by: INTERNAL MEDICINE

## 2022-11-16 PROCEDURE — 3078F DIAST BP <80 MM HG: CPT | Performed by: INTERNAL MEDICINE

## 2022-11-16 PROCEDURE — 3074F SYST BP LT 130 MM HG: CPT | Performed by: INTERNAL MEDICINE

## 2022-11-16 PROCEDURE — G8417 CALC BMI ABV UP PARAM F/U: HCPCS | Performed by: INTERNAL MEDICINE

## 2022-11-16 PROCEDURE — 99214 OFFICE O/P EST MOD 30 MIN: CPT | Performed by: INTERNAL MEDICINE

## 2022-11-16 PROCEDURE — G8428 CUR MEDS NOT DOCUMENT: HCPCS | Performed by: INTERNAL MEDICINE

## 2022-11-16 ASSESSMENT — ENCOUNTER SYMPTOMS
COUGH: 0
ABDOMINAL PAIN: 0
BACK PAIN: 0
SHORTNESS OF BREATH: 0

## 2022-11-16 NOTE — PROGRESS NOTES
[address], [facility] [address]      22      NAME:  Miriam Barrett  : 1945  MRN: [id number]      SUBJECTIVE:   Miriam Barrett is a 69 y.o. male seen for a follow up visit regarding the following:     Chief Complaint   Patient presents with    Hypertension    Congestive Heart Failure       HPI:    Patient with history of dyspnea and chest pain and normal echo and stress testing in . He developed CHB and symptomatic bradycardia s/p PM.  His echo showed severe reduction in EF that occurred in the face of chronic RV pacing. He had normal coronaries and EF improved to 50% with BiV pacing. He is NYHA class II CHF. Echo 2022 was again normal with EF 50-55%. Currently asymptomatic. Past Medical History, Past Surgical History, Family history, Social History, and Medications were all reviewed with the patient today and updated as necessary.      Current Outpatient Medications   Medication Sig Dispense Refill    lisinopril (PRINIVIL;ZESTRIL) 5 MG tablet TAKE 1 TABLET EVERY DAY 90 tablet 4    doxazosin (CARDURA) 8 MG tablet TAKE 1 TABLET EVERY NIGHT FOR HIGH BLOOD PRESSURE AND ENLARGED PROSTATE WITH URINATION PROBLEM 90 tablet 4    pravastatin (PRAVACHOL) 20 MG tablet Take 1 tablet by mouth daily 90 tablet 3    pioglitazone (ACTOS) 30 MG tablet Take 1 tablet by mouth daily 90 tablet 3    glipiZIDE (GLUCOTROL) 10 MG tablet TAKE 2 TABLETS IN THE MORNING AND 1 TABLET AT BEDTIME 270 tablet 3    CONTOUR TEST strip USE STRIP TO CHECK GLUCOSE ONCE DAILY      blood glucose test strips (CONTOUR NEXT TEST) strip Testing BS one time a day as directed, E11.9      cetirizine (ZYRTEC) 10 MG tablet Take 10 mg by mouth      ergocalciferol (ERGOCALCIFEROL) 1.25 MG (96835 UT) capsule Take 50,000 Units by mouth every 7 days      levothyroxine (SYNTHROID) 150 MCG tablet 1 tablet once a day except for 1/2 tablet on Sundays       No current facility-administered medications for this visit.      Patient Active Problem List    Diagnosis Date Noted    Type 2 diabetes mellitus with hyperglycemia 2022     Priority: High    Vitamin D deficiency 2019     Priority: Low    Postsurgical hypothyroidism 2019     Priority: Low    Osteopenia 2019     Priority: Low    Papillary thyroid carcinoma ([facility] 2019     Priority: Low    History of primary hyperparathyroidism 2019     Priority: Low    Rosacea 2018     Priority: Low    Gastroesophageal reflux disease without esophagitis 2018     Priority: Low    Nasal congestion 2018     Priority: Low    Systolic CHF, chronic ([facility] 02/15/2017     Priority: Low     10/2014:  EF 65%  2017:  EF 30-35%  2017:  EF 30-35% - Normal coronaries  2017:  EF 50%  10/2018:  EF 55%  2020:  EF 50-55%  2022:  EF 50-55%        Biventricular cardiac pacemaker in situ 2016     Priority: Low     normal function        Dyslipidemia 2015     Priority: Low    Type II diabetes mellitus ([facility] 2015     Priority: Low    Symptomatic bradycardia 2015     Priority: Low    Hypertension 2015     Priority: Low    Malignant neoplasm of prostate ([facility] 05/15/2015     Priority: Low    Impotence of organic origin 2014     Priority: Low    Elevated prostate specific antigen (PSA) 2014     Priority: Low      Family History   Problem Relation Age of Onset    Cancer Mother     Hypertension Mother     Heart Disease Mother     Cancer Father     Heart Disease Father         MI    Coronary Art Dis Other         family history     Social History     Tobacco Use    Smoking status: Former     Types: Cigarettes     Quit date: 2001     Years since quittin.5    Smokeless tobacco: Current    Tobacco comments:     Quit smoking: smoke cigars for 30 years now chews cigars   Substance Use Topics    Alcohol use: No       Review Of Symptoms    Review of Systems Constitutional: Negative for fever and malaise/fatigue. HENT:  Negative for nosebleeds. Cardiovascular:  Negative for chest pain, dyspnea on exertion and palpitations. Respiratory:  Negative for cough and shortness of breath. Musculoskeletal:  Negative for back pain, muscle cramps, muscle weakness and myalgias. Gastrointestinal:  Negative for abdominal pain. Neurological:  Negative for dizziness. Physical Exam  Blood pressure 138/82, pulse 80, height 5' 6\" (1.676 m), weight 225 lb (102.1 kg). Physical Exam  HENT:      Head: Normocephalic and atraumatic. Eyes:      Extraocular Movements: Extraocular movements intact. Cardiovascular:      Rate and Rhythm: Normal rate and regular rhythm. Heart sounds: No murmur heard. Pulmonary:      Effort: Pulmonary effort is normal.      Breath sounds: Normal breath sounds. Abdominal:      Palpations: Abdomen is soft. Musculoskeletal:         General: Normal range of motion. Skin:     General: Skin is warm and dry. Neurological:      General: No focal deficit present. Mental Status: He is oriented to person, place, and time. Medical problems, medical history and test results were reviewed with the patient today.       Lab Results   Component Value Date    CHOL 110 07/13/2022    CHOL 161 01/13/2022    CHOL 124 02/03/2021     Lab Results   Component Value Date    TRIG 250 (H) 07/13/2022    TRIG 416 (H) 01/13/2022    TRIG 170 (H) 02/03/2021     Lab Results   Component Value Date    HDL 41 07/13/2022    HDL 36 (L) 01/13/2022    HDL 40 02/03/2021     Lab Results   Component Value Date    LDLCALC 19 07/13/2022    [address] Not calculated due to elevated triglyceride level 01/13/2022    LDLCALC 55 02/03/2021     Lab Results   Component Value Date    LABVLDL 50 (H) 07/13/2022    LABVLDL 83.2 (H) 01/13/2022    LABVLDL 37 05/29/2020    VLDL 29 02/03/2021     Lab Results   Component Value Date    CHOLHDLRATIO 2.7 07/13/2022    CHOLHDLRATIO 4.5 01/13/2022        Lab Results   Component Value Date    LABA1C 8.7 (H) 07/13/2022     Lab Results   Component Value Date     07/13/2022        Lab Results   Component Value Date     (L) 07/07/2022    K 3.9 07/07/2022     07/07/2022    CO2 24 07/07/2022    BUN 16 07/07/2022    CREATININE 1.10 07/07/2022    GLUCOSE 196 (H) 07/07/2022    CALCIUM 9.3 07/07/2022    CALCIUM 9.4 07/07/2022    PROT 7.1 01/13/2022    LABALBU 3.5 01/13/2022    BILITOT 0.3 01/13/2022    ALKPHOS 55 01/13/2022    AST 20 01/13/2022    ALT 14 01/13/2022    LABGLOM >60 07/07/2022    GFRAA >60 07/07/2022    AGRATIO 1.0 (L) 01/13/2022    GLOB 3.6 (H) 01/13/2022       Lab Results   Component Value Date/Time     07/07/2022 11:26 AM    K 3.9 07/07/2022 11:26 AM     07/07/2022 11:26 AM    CO2 24 07/07/2022 11:26 AM    BUN 16 07/07/2022 11:26 AM    CREATININE 1.10 07/07/2022 11:26 AM    GLUCOSE 196 07/07/2022 11:26 AM    CALCIUM 9.3 07/07/2022 11:26 AM    CALCIUM 9.4 07/07/2022 11:26 AM        Lab Results   Component Value Date    WBC 7.6 06/10/2021    HGB 14.6 06/10/2021    HCT 45.1 06/10/2021    MCV 81 06/10/2021     06/10/2021             ASSESSMENT:    Keri Thompson was seen today for follow-up. Diagnoses and all orders for this visit:    Chronic systolic congestive heart failure - EF was 30-35% and now 50-55%. Likely was due to chronic RV pacing as he has no CAD. S/P BiVPM and EF improved. No BB at this time due to fatigue. Continue lisinopril 5mg daily. Biventricular cardiac pacemaker - Normal function. Type 2 diabetes mellitus without complication (HCC) - This has been reasonably well controlled. Hgb A1c - 8.7% 07/2022. Patient educated about diet and lifestyle changes to improve BG control. Essential hypertension with goal blood pressure less than 140/90 - This has been controlled on lisinopril.      Dyslipidemia - Well controlled lofibra  - LDL 19 08/2022    Problem List Items Addressed This Visit          Circulatory    Systolic CHF, chronic (HCC) - Primary    Biventricular cardiac pacemaker in situ    Hypertension       Endocrine    Type II diabetes mellitus ([facility] [address]       Other    Dyslipidemia       Medications Discontinued During This Encounter   Medication Reason    omeprazole (PRILOSEC) 40 MG delayed release capsule LIST CLEANUP             Patient has been instructed and agrees to call our office with any issues or other concerns related to their cardiac condition(s) and/or complaint(s). Return in about 6 months (around 5/16/2023).        Geremias Cho MD  11/16/2022

## 2022-11-23 PROCEDURE — 93294 REM INTERROG EVL PM/LDLS PM: CPT | Performed by: INTERNAL MEDICINE

## 2022-11-23 PROCEDURE — 93296 REM INTERROG EVL PM/IDS: CPT | Performed by: INTERNAL MEDICINE

## 2022-12-21 DIAGNOSIS — Z95.0 BIVENTRICULAR CARDIAC PACEMAKER IN SITU: ICD-10-CM

## 2022-12-21 DIAGNOSIS — I50.22 SYSTOLIC CHF, CHRONIC (HCC): ICD-10-CM

## 2022-12-21 DIAGNOSIS — R00.1 SYMPTOMATIC BRADYCARDIA: ICD-10-CM

## 2023-01-10 ENCOUNTER — TELEPHONE (OUTPATIENT)
Dept: INTERNAL MEDICINE CLINIC | Facility: CLINIC | Age: 78
End: 2023-01-10

## 2023-01-10 NOTE — TELEPHONE ENCOUNTER
Attempted to call pt back. Picked up, but didn't respond and then hung up. Pt appt on 1/17 is for AWE, should come fasting and will get labs drawn at that appt.

## 2023-01-10 NOTE — TELEPHONE ENCOUNTER
----- Message from Robe Manzanares sent at 1/10/2023 11:04 AM EST -----  Subject: Message to Provider    QUESTIONS  Information for Provider? Patient has an appt scheduled on 1/17/23;   patient wanted to make sure his lab work is from Dr. Lorrie Pettit. Patient states   that last month was incorrect  ---------------------------------------------------------------------------  --------------  Ashley Delgado INFO  [phone]; OK to leave message on voicemail  ---------------------------------------------------------------------------  --------------  SCRIPT ANSWERS  Relationship to Patient?  Self

## 2023-01-17 ENCOUNTER — OFFICE VISIT (OUTPATIENT)
Dept: INTERNAL MEDICINE CLINIC | Facility: CLINIC | Age: 78
End: 2023-01-17
Payer: MEDICARE

## 2023-01-17 VITALS
WEIGHT: 223 LBS | BODY MASS INDEX: 35.84 KG/M2 | DIASTOLIC BLOOD PRESSURE: 84 MMHG | HEIGHT: 66 IN | SYSTOLIC BLOOD PRESSURE: 128 MMHG

## 2023-01-17 DIAGNOSIS — E11.65 TYPE 2 DIABETES MELLITUS WITH HYPERGLYCEMIA, WITHOUT LONG-TERM CURRENT USE OF INSULIN (HCC): ICD-10-CM

## 2023-01-17 DIAGNOSIS — E11.9 TYPE 2 DIABETES MELLITUS WITHOUT COMPLICATION, WITHOUT LONG-TERM CURRENT USE OF INSULIN (HCC): ICD-10-CM

## 2023-01-17 DIAGNOSIS — Z00.00 MEDICARE ANNUAL WELLNESS VISIT, SUBSEQUENT: Primary | ICD-10-CM

## 2023-01-17 DIAGNOSIS — C73 PAPILLARY THYROID CARCINOMA (HCC): ICD-10-CM

## 2023-01-17 DIAGNOSIS — E66.01 SEVERE OBESITY (BMI 35.0-39.9) WITH COMORBIDITY (HCC): ICD-10-CM

## 2023-01-17 DIAGNOSIS — I44.2 ATRIOVENTRICULAR BLOCK, COMPLETE (HCC): ICD-10-CM

## 2023-01-17 DIAGNOSIS — C61 MALIGNANT NEOPLASM OF PROSTATE (HCC): ICD-10-CM

## 2023-01-17 DIAGNOSIS — E89.2 POSTPROCEDURAL HYPOPARATHYROIDISM (HCC): ICD-10-CM

## 2023-01-17 LAB
BASOPHILS # BLD: 0.1 K/UL (ref 0–0.2)
BASOPHILS NFR BLD: 1 % (ref 0–2)
CREAT UR-MCNC: 227 MG/DL
DIFFERENTIAL METHOD BLD: ABNORMAL
EOSINOPHIL # BLD: 0.1 K/UL (ref 0–0.8)
EOSINOPHIL NFR BLD: 2 % (ref 0.5–7.8)
ERYTHROCYTE [DISTWIDTH] IN BLOOD BY AUTOMATED COUNT: 13.8 % (ref 11.9–14.6)
EST. AVERAGE GLUCOSE BLD GHB EST-MCNC: 171 MG/DL
HBA1C MFR BLD: 7.6 % (ref 4.8–5.6)
HCT VFR BLD AUTO: 47.7 % (ref 41.1–50.3)
HGB BLD-MCNC: 15.5 G/DL (ref 13.6–17.2)
IMM GRANULOCYTES # BLD AUTO: 0 K/UL (ref 0–0.5)
IMM GRANULOCYTES NFR BLD AUTO: 0 % (ref 0–5)
LYMPHOCYTES # BLD: 2 K/UL (ref 0.5–4.6)
LYMPHOCYTES NFR BLD: 23 % (ref 13–44)
MCH RBC QN AUTO: 26.9 PG (ref 26.1–32.9)
MCHC RBC AUTO-ENTMCNC: 32.5 G/DL (ref 31.4–35)
MCV RBC AUTO: 82.8 FL (ref 82–102)
MICROALBUMIN UR-MCNC: 2.07 MG/DL (ref 0–3)
MICROALBUMIN/CREAT UR-RTO: 9 MG/G (ref 0–30)
MONOCYTES # BLD: 0.6 K/UL (ref 0.1–1.3)
MONOCYTES NFR BLD: 7 % (ref 4–12)
NEUTS SEG # BLD: 5.9 K/UL (ref 1.7–8.2)
NEUTS SEG NFR BLD: 67 % (ref 43–78)
NRBC # BLD: 0 K/UL (ref 0–0.2)
PLATELET # BLD AUTO: 214 K/UL (ref 150–450)
PMV BLD AUTO: 11.8 FL (ref 9.4–12.3)
RBC # BLD AUTO: 5.76 M/UL (ref 4.23–5.6)
WBC # BLD AUTO: 8.8 K/UL (ref 4.3–11.1)

## 2023-01-17 PROCEDURE — G8427 DOCREV CUR MEDS BY ELIG CLIN: HCPCS | Performed by: INTERNAL MEDICINE

## 2023-01-17 PROCEDURE — 3079F DIAST BP 80-89 MM HG: CPT | Performed by: INTERNAL MEDICINE

## 2023-01-17 PROCEDURE — 3074F SYST BP LT 130 MM HG: CPT | Performed by: INTERNAL MEDICINE

## 2023-01-17 PROCEDURE — G0439 PPPS, SUBSEQ VISIT: HCPCS | Performed by: INTERNAL MEDICINE

## 2023-01-17 PROCEDURE — G8484 FLU IMMUNIZE NO ADMIN: HCPCS | Performed by: INTERNAL MEDICINE

## 2023-01-17 PROCEDURE — G8417 CALC BMI ABV UP PARAM F/U: HCPCS | Performed by: INTERNAL MEDICINE

## 2023-01-17 PROCEDURE — 99213 OFFICE O/P EST LOW 20 MIN: CPT | Performed by: INTERNAL MEDICINE

## 2023-01-17 PROCEDURE — 1036F TOBACCO NON-USER: CPT | Performed by: INTERNAL MEDICINE

## 2023-01-17 PROCEDURE — 1123F ACP DISCUSS/DSCN MKR DOCD: CPT | Performed by: INTERNAL MEDICINE

## 2023-01-17 RX ORDER — PIOGLITAZONEHYDROCHLORIDE 30 MG/1
30 TABLET ORAL DAILY
Qty: 90 TABLET | Refills: 3 | Status: SHIPPED | OUTPATIENT
Start: 2023-01-17

## 2023-01-17 ASSESSMENT — PATIENT HEALTH QUESTIONNAIRE - PHQ9
SUM OF ALL RESPONSES TO PHQ QUESTIONS 1-9: 1
SUM OF ALL RESPONSES TO PHQ QUESTIONS 1-9: 1
SUM OF ALL RESPONSES TO PHQ9 QUESTIONS 1 & 2: 1
SUM OF ALL RESPONSES TO PHQ QUESTIONS 1-9: 1
SUM OF ALL RESPONSES TO PHQ QUESTIONS 1-9: 1
1. LITTLE INTEREST OR PLEASURE IN DOING THINGS: 1
2. FEELING DOWN, DEPRESSED OR HOPELESS: 0

## 2023-01-17 ASSESSMENT — LIFESTYLE VARIABLES
HOW MANY STANDARD DRINKS CONTAINING ALCOHOL DO YOU HAVE ON A TYPICAL DAY: PATIENT DOES NOT DRINK
HOW OFTEN DO YOU HAVE A DRINK CONTAINING ALCOHOL: NEVER

## 2023-01-17 NOTE — PROGRESS NOTES
SUBJECTIVE:   Donnie Rangel is a 69 y.o. male seen for a visit regarding   Chief Complaint   Patient presents with    Medicare AWV     Pt is here for awv-fasting        Diabetes  He presents for his follow-up diabetic visit. He has type 2 diabetes mellitus. His disease course has been stable. Pertinent negatives for diabetes include no polyuria (some frequency). Symptoms are stable. Current diabetic treatment includes oral agent (dual therapy) (glipizdie 3 tab; metformin). Home blood sugar record trend: checks qd - 120's-130's done 2 h after lunch if possible- 6 mo ago was 160-170-improved diet. (A1C in July 8.3 higher --farxiga and jardiance were sent but both over $500-did not want NPH also)     Past Medical History, Past Surgical History, Family history, Social History, and Medications were all reviewed with the patient today and updated as necessary. Current Outpatient Medications   Medication Sig Dispense Refill    pioglitazone (ACTOS) 30 MG tablet Take 1 tablet by mouth daily 90 tablet 3    lisinopril (PRINIVIL;ZESTRIL) 5 MG tablet TAKE 1 TABLET EVERY DAY 90 tablet 4    doxazosin (CARDURA) 8 MG tablet TAKE 1 TABLET EVERY NIGHT FOR HIGH BLOOD PRESSURE AND ENLARGED PROSTATE WITH URINATION PROBLEM 90 tablet 4    pravastatin (PRAVACHOL) 20 MG tablet Take 1 tablet by mouth daily 90 tablet 3    glipiZIDE (GLUCOTROL) 10 MG tablet TAKE 2 TABLETS IN THE MORNING AND 1 TABLET AT BEDTIME 270 tablet 3    CONTOUR TEST strip USE STRIP TO CHECK GLUCOSE ONCE DAILY      blood glucose test strips (CONTOUR NEXT TEST) strip Testing BS one time a day as directed, E11.9      cetirizine (ZYRTEC) 10 MG tablet Take 10 mg by mouth daily      ergocalciferol (ERGOCALCIFEROL) 1.25 MG (97198 UT) capsule Take 50,000 Units by mouth every 7 days      levothyroxine (SYNTHROID) 150 MCG tablet 1 tablet once a day except for 1/2 tablet on Sundays       No current facility-administered medications for this visit.      Allergies Allergen Reactions    Metformin And Related Diarrhea     Patient Active Problem List   Diagnosis    Vitamin D deficiency    Dyslipidemia    History of primary hyperparathyroidism    Nasal congestion    Postsurgical hypothyroidism    Impotence of organic origin    Gastroesophageal reflux disease without esophagitis    Type II diabetes mellitus (HCC)    Papillary thyroid carcinoma (HCC)    Rosacea    Systolic CHF, chronic (HCC)    Symptomatic bradycardia    Elevated prostate specific antigen (PSA)    Biventricular cardiac pacemaker in situ    Malignant neoplasm of prostate ([address]    Osteopenia    Hypertension    Type 2 diabetes mellitus with hyperglycemia    Atrioventricular block, complete (HCC)    Postprocedural hypoparathyroidism (HCC)     Social History     Tobacco Use    Smoking status: Former     Types: Cigarettes     Quit date: 2001     Years since quittin.7    Smokeless tobacco: Former     Types: Chew     Quit date: 2022    Tobacco comments:     Quit smoking: smoke cigars for 30 years now chews cigars   Substance Use Topics    Alcohol use: No          Review of Systems   Cardiovascular:         Hx low EF , heart block -had BiV pacer -EF up to 50-55 done 2022   Endocrine: Negative for polyuria (some frequency). Sees Dr AnnM-arie Mary thyroid ca (papillary)   Genitourinary:  Positive for frequency. Negative for dysuria. Had jose 7 prostate ca and observed 1-2 yr-RT done at psa 5 range in 2016-psa 0.078 done 2022-was 0.097 2020   Neurological:  Negative for numbness. OBJECTIVE:  /84   Ht 5' 6\" (1.676 m)   Wt 223 lb (101.2 kg)   BMI 35.99 kg/m²      Physical Exam     Diabetic foot exam:   Left Foot:   Visual Exam: normal   Pulse DP: 2+ (normal)   Filament test: normal sensation     Right Foot:   Visual Exam: normal   Pulse DP: 2+ (normal)   Filament test: normal sensation       Medical problems and test results were reviewed with the patient today. ASSESSMENT and PLAN     1. Medicare annual wellness visit, subsequent  2. Type 2 diabetes mellitus without complication, without long-term current use of insulin (HCC)  -     pioglitazone (ACTOS) 30 MG tablet; Take 1 tablet by mouth daily, Disp-90 tablet, R-3Normal  -     Hemoglobin A1C; Future  -     Lipid Panel; Future  -     CBC with Auto Differential; Future  -     Comprehensive Metabolic Panel; Future  -     Microalbumin / Creatinine Urine Ratio; Future  3. Type 2 diabetes mellitus with hyperglycemia, without long-term current use of insulin ([address]  4. Atrioventricular block, complete ([address]  5. Postprocedural hypoparathyroidism ([facility]  6. Severe obesity (BMI 35.0-39. 9) with comorbidity ([facility]  7. Malignant neoplasm of prostate (HCC)  -     PSA, ultrasensitive; Future  8. Papillary thyroid carcinoma (HCC)   Last A1C high; has improved diet; sugars sound better-recheck --if SGLT 2 needed has new plan --last trig improved also --on low dose statin ;heart stable and followed -get psa ; endo for thyroid    lab results and schedule for future lab studies reviewed with patient  Cardiovascular risk and specific lipid/ LDL goals reviewed  reviewed medications and side effects in detail     Return in 6 months (on 7/17/2023) for Medicare Annual Wellness Visit in 1 year, for diabetic f/u(6 mo), for diabetic f/u. Medicare Annual Wellness Visit    Jaswantjoana Guaman is here for Medicare AWV (Pt is here for awv-fasting)    Assessment & Plan   Medicare annual wellness visit, subsequent  Type 2 diabetes mellitus without complication, without long-term current use of insulin (HCC)  -     pioglitazone (ACTOS) 30 MG tablet; Take 1 tablet by mouth daily, Disp-90 tablet, R-3Normal  -     Hemoglobin A1C; Future  -     Lipid Panel; Future  -     CBC with Auto Differential; Future  -     Comprehensive Metabolic Panel; Future  -     Microalbumin / Creatinine Urine Ratio;  Future  Type 2 diabetes mellitus with hyperglycemia, without long-term current use of insulin (HCC)  Atrioventricular block, complete (HCC)  Postprocedural hypoparathyroidism (HCC)  Severe obesity (BMI 35.0-39. 9) with comorbidity ([facility]  Malignant neoplasm of prostate (HCC)  -     PSA, ultrasensitive; Future  Papillary thyroid carcinoma ([facility]    Recommendations for Preventive Services Due: see orders and patient instructions/AVS.  Recommended screening schedule for the next 5-10 years is provided to the patient in written form: see Patient Instructions/AVS.     Return in 6 months (on 7/17/2023) for Medicare Annual Wellness Visit in 1 year, for diabetic f/u(6 mo), for diabetic f/u. Subjective       Patient's complete Health Risk Assessment and screening values have been reviewed and are found in Flowsheets. The following problems were reviewed today and where indicated follow up appointments were made and/or referrals ordered.     Positive Risk Factor Screenings with Interventions:                 Weight and Activity:  Physical Activity: Insufficiently Active    Days of Exercise per Week: 1 day    Minutes of Exercise per Session: 30 min     On average, how many days per week do you engage in moderate to strenuous exercise (like a brisk walk)?: 1 day  Have you lost any weight without trying in the past 3 months?: No  Body mass index: (!) 35.99  Obesity Interventions:  Patient declines any further evaluation or treatment          Dentist Screen:  Have you seen the dentist within the past year?: (!) No    Intervention:  Patient declines any further evaluation or treatment      Safety:  Do you have either shower bars, grab bars, non-slip mats or non-slip surfaces in your shower or bathtub?: (!) No  Interventions:  Patient declined any further interventions or treatment    ADL's:   Patient reports needing help with:  Select all that apply: [employer], Shopping, Food Preparation  Interventions:  Girlfriend helps at home      Interventions:  Patient declined any further intervention or treatment                        Objective   Vitals:    01/17/23 1119   BP: 128/84   Weight: 223 lb (101.2 kg)   Height: 5' 6\" (1.676 m)      Body mass index is 35.99 kg/m². General Appearance: alert and oriented to person, place and time, well-developed and well-nourished, in no acute distress  Pulmonary/Chest: clear to auscultation bilaterally- no wheezes, rales or rhonchi, normal air movement, no respiratory distress  Cardiovascular: normal rate, normal S1 and S2, no gallops, intact distal pulses, and no carotid bruits     . mu  Allergies   Allergen Reactions    Metformin And Related Diarrhea     Prior to Visit Medications    Medication Sig Taking?  Authorizing Provider   pioglitazone (ACTOS) 30 MG tablet Take 1 tablet by mouth daily Yes Shari Toledo MD   lisinopril (PRINIVIL;ZESTRIL) 5 MG tablet TAKE 1 TABLET EVERY DAY Yes Shari Toledo MD   doxazosin (CARDURA) 8 MG tablet TAKE 1 TABLET EVERY NIGHT FOR HIGH BLOOD PRESSURE AND ENLARGED PROSTATE WITH URINATION PROBLEM Yes Shari Toledo MD   pravastatin (PRAVACHOL) 20 MG tablet Take 1 tablet by mouth daily Yes Shari Toledo MD   glipiZIDE (GLUCOTROL) 10 MG tablet TAKE 2 TABLETS IN THE MORNING AND 1 TABLET AT BEDTIME Yes Shari Toledo MD   CONTOUR TEST strip USE STRIP TO CHECK GLUCOSE ONCE DAILY Yes Historical Provider, MD   blood glucose test strips (CONTOUR NEXT TEST) strip Testing BS one time a day as directed, E11.9 Yes Historical Provider, MD   cetirizine (ZYRTEC) 10 MG tablet Take 10 mg by mouth daily Yes Ar Automatic Reconciliation   ergocalciferol (ERGOCALCIFEROL) 1.25 MG (36003 UT) capsule Take 50,000 Units by mouth every 7 days Yes Ar Automatic Reconciliation   levothyroxine (SYNTHROID) 150 MCG tablet 1 tablet once a day except for 1/2 tablet on Sundays Yes Ar Automatic Reconciliation       CareTeam (Including outside providers/suppliers regularly involved in providing care):   Patient Care Team:  Shari Toledo MD as PCP - Grady Adames MD as PCP - [facility] Empaneled Provider  Estrella Baker MD as Physician  Dr Luc Ruff -cardiology; Dr Bailey Shell and updated this visit:  Tobacco  Allergies  Meds  Problems  Med Hx  Surg Hx  Soc Hx  Fam Hx

## 2023-01-17 NOTE — PATIENT INSTRUCTIONS
Learning About Dental Care for Older Adults  Dental care for older adults: Overview  Dental care for older people is much the same as for younger adults. But older adults do have concerns that younger adults do not. Older adults may have problems with gum disease and decay on the roots of their teeth. They may need missing teeth replaced or broken fillings fixed. Or they may have dentures that need to be cared for. Some older adults may have trouble holding a toothbrush. You can help remind the person you are caring for to brush and floss their teeth or to clean their dentures. In some cases, you may need to do the brushing and other dental care tasks. People who have trouble using their hands or who have dementia may need this extra help. How can you help with dental care? Normal dental care  To keep the teeth and gums healthy:  Brush the teeth with fluoride toothpaste twice a day--in the morning and at night--and floss at least once a day. Plaque can quickly build up on the teeth of older adults. Watch for the signs of gum disease. These signs include gums that bleed after brushing or after eating hard foods, such as apples. See a dentist regularly. Many experts recommend checkups every 6 months. Keep the dentist up to date on any new medications the person is taking. Encourage a balanced diet that includes whole grains, vegetables, and fruits, and that is low in saturated fat and sodium. Encourage the person you're caring for not to use tobacco products. They can affect dental and general health. Many older adults have a fixed income and feel that they can't afford dental care. But most [facility] and [facility] have programs in which dentists help older adults by lowering fees. Contact your area's public health offices or  for information about dental care in your area.   Using a toothbrush  Older adults with arthritis sometimes have trouble brushing their teeth because they can't easily hold the toothbrush. Their hands and fingers may be stiff, painful, or weak. If this is the case, you can: Offer an electric toothbrush. Enlarge the handle of a non-electric toothbrush by wrapping a sponge, an elastic bandage, or adhesive tape around it. Push the toothbrush handle through a ball made of rubber or soft foam.  Make the handle longer and thicker by taping Popsicle sticks or tongue depressors to it. You may also be able to buy special toothbrushes, toothpaste dispensers, and floss holders. Your doctor may recommend a soft-bristle toothbrush if the person you care for bleeds easily. Bleeding can happen because of a health problem or from certain medicines. A toothpaste for sensitive teeth may help if the person you care for has sensitive teeth. How do you brush and floss someone's teeth? If the person you are caring for has a hard time cleaning their teeth on their own, you may need to brush and floss their teeth for them. It may be easiest to have the person sit and face away from you, and to sit or stand behind them. That way you can steady their head against your arm as you reach around to floss and brush their teeth. Choose a place that has good lighting and is comfortable for both of you. Before you begin, gather your supplies. You will need gloves, floss, a toothbrush, and a container to hold water if you are not near a sink. Wash and dry your hands well and put on gloves. Start by flossing:  Gently work a piece of floss between each of the teeth toward the gums. A plastic flossing tool may make this easier, and they are available at most [facility]. Curve the floss around each tooth into a U-shape and gently slide it under the gum line. Move the floss firmly up and down several times to scrape off the plaque. After you've finished flossing, throw away the used floss and begin brushing:  Wet the brush and apply toothpaste. Place the brush at a 45-degree angle where the teeth meet the gums. Press firmly, and move the brush in small circles over the surface of the teeth. Be careful not to brush too hard. Vigorous brushing can make the gums pull away from the teeth and can scratch the tooth enamel. Brush all surfaces of the teeth, on the tongue side and on the cheek side. Pay special attention to the front teeth and all surfaces of the back teeth. Brush chewing surfaces with short back-and-forth strokes. After you've finished, help the person rinse the remaining toothpaste from their mouth. Where can you learn more? Go to http://www.woods.com/ and enter F944 to learn more about \"Learning About Dental Care for Older Adults. \"  Current as of: June 16, 2022               Content Version: 13.5  © 2006-2022 Healthwise, [employer]. Care instructions adapted under license by [facility] ([facility]). If you have questions about a medical condition or this instruction, always ask your healthcare professional. [place] [address] any warranty or liability for your use of this information. Learning About Activities of Daily Living  What are activities of daily living? Activities of daily living (ADLs) are the basic self-care tasks you do every day. As you age, and if you have health problems, you may find that it's harder to do these things for yourself. That's when you may need some help. Your doctor uses ADLs to measure how much help you need. Knowing what you can and can't do for yourself is an important first step to getting help. And when you have the help you need, you can stay as independent as possible. Your doctor will want to know if you are able to do tasks such as: Take a bath or shower without help. Go to the bathroom by yourself. Dress and undress without help. Shave, comb your hair, and brush teeth on your own. Get in and out of bed or a chair without help. Feed yourself without help.   If you are having trouble doing basic self-care tasks, talk with your doctor. You may want to bring a caregiver or family member who can help the doctor understand your needs and abilities. How will a doctor assess your ADLs? Asking about ADLs is part of a routine health checkup your doctor will likely do as you age. Your health check might be done in a doctor's office, in your home, or at a hospital. The goal is to find out if you are having any problems that could make your health problems worse or that make it unsafe for you to be on your own. To measure your ADLs, your doctor will ask how hard it is for you to do routine tasks. He or she may also want to know if you have changed the way you do a task because of a health problem. He or she may watch how you:  Walk back and forth. Keep your balance while you stand or walk. Move from sitting to standing or from a bed to a chair. Button or unbutton a shirt or sweater. Remove and put on your shoes. It's normal to feel a little worried or anxious if you find you can't do all the things you used to be able to do. Talking with your doctor about ADLs isn't a test that you either pass or fail. It's just a way to get more information about your health and safety. Follow-up care is a key part of your treatment and safety. Be sure to make and go to all appointments, and call your doctor if you are having problems. It's also a good idea to know your test results and keep a list of the medicines you take. Current as of: October 6, 2021               Content Version: 13.5  © 2006-2022 Healthwise, Incorporated. Care instructions adapted under license by [facility] ([facility]). If you have questions about a medical condition or this instruction, always ask your healthcare professional. [place] [address] any warranty or liability for your use of this information. Advance Directives: Care Instructions  Overview  An advance directive is a legal way to state your wishes at the end of your life.  It tells your family and your doctor what to do if you can't say what you want. There are two main types of advance directives. You can change them any time your wishes change. Living will. This form tells your family and your doctor your wishes about life support and other treatment. The form is also called a declaration. Medical power of . This form lets you name a person to make treatment decisions for you when you can't speak for yourself. This person is called a health care agent (health care proxy, health care surrogate). The form is also called a durable power of  for health care. If you do not have an advance directive, decisions about your medical care may be made by a family member, or by a doctor or a  who doesn't know you. It may help to think of an advance directive as a gift to the people who care for you. If you have one, they won't have to make tough decisions by themselves. For more information, including forms for your state, see the [address] website (www.caringinfo.org/planning/advance-directives/). Follow-up care is a key part of your treatment and safety. Be sure to make and go to all appointments, and call your doctor if you are having problems. It's also a good idea to know your test results and keep a list of the medicines you take. What should you include in an advance directive? Many states have a unique advance directive form. (It may ask you to address specific issues.) Or you might use a universal form that's approved by many states. If your form doesn't tell you what to address, it may be hard to know what to include in your advance directive. Use the questions below to help you get started. Who do you want to make decisions about your medical care if you are not able to? What life-support measures do you want if you have a serious illness that gets worse over time or can't be cured? What are you most afraid of that might happen?  (Maybe you're afraid of having pain, losing your independence, or being kept alive by machines.)  Where would you prefer to die? (Your home? A hospital? A nursing home?)  Do you want to donate your organs when you die? Do you want certain [employer]an practices performed before you die? When should you call for help? Be sure to contact your doctor if you have any questions. Where can you learn more? Go to http://www.chen.com/ and enter R264 to learn more about \"Advance Directives: Care Instructions. \"  Current as of: June 16, 2022               Content Version: 13.5  © 3208-7693 Healthwise, [employer]. Care instructions adapted under license by [facility] [address] If you have questions about a medical condition or this instruction, always ask your healthcare professional. [address] any warranty or liability for your use of this information. Personalized Preventive Plan for Rea Ernie - 1/17/2023  Medicare offers a range of preventive health benefits. Some of the tests and screenings are paid in full while other may be subject to a deductible, co-insurance, and/or copay. Some of these benefits include a comprehensive review of your medical history including lifestyle, illnesses that may run in your family, and various assessments and screenings as appropriate. After reviewing your medical record and screening and assessments performed today your provider may have ordered immunizations, labs, imaging, and/or referrals for you. A list of these orders (if applicable) as well as your Preventive Care list are included within your After Visit Summary for your review. Other Preventive Recommendations:    A preventive eye exam performed by an eye specialist is recommended every 1-2 years to screen for glaucoma; cataracts, macular degeneration, and other eye disorders. A preventive dental visit is recommended every 6 months.   Try to get at least 150 minutes of exercise per week or 10,000 steps per day on a pedometer . Order or download the FREE \"Exercise & Physical Activity: Your Everyday Guide\" from [employer]. Call 5-315.859.5124 or search [employer] online. You need 8766-2576 mg of calcium and 7593-6630 IU of vitamin D per day. It is possible to meet your calcium requirement with diet alone, but a vitamin D supplement is usually necessary to meet this goal.  When exposed to the sun, use a sunscreen that protects against both UVA and UVB radiation with an SPF of 30 or greater. Reapply every 2 to 3 hours or after sweating, drying off with a towel, or swimming. Always wear a seat belt when traveling in a car. Always wear a helmet when riding a bicycle or motorcycle.

## 2023-01-18 LAB
ALBUMIN SERPL-MCNC: 4.2 G/DL (ref 3.2–4.6)
ALBUMIN/GLOB SERPL: 1.4 (ref 0.4–1.6)
ALP SERPL-CCNC: 58 U/L (ref 50–136)
ALT SERPL-CCNC: 9 U/L (ref 12–65)
ANION GAP SERPL CALC-SCNC: 7 MMOL/L (ref 2–11)
AST SERPL-CCNC: 14 U/L (ref 15–37)
BILIRUB SERPL-MCNC: 0.6 MG/DL (ref 0.2–1.1)
BUN SERPL-MCNC: 18 MG/DL (ref 8–23)
CALCIUM SERPL-MCNC: 9.6 MG/DL (ref 8.3–10.4)
CHLORIDE SERPL-SCNC: 105 MMOL/L (ref 101–110)
CHOLEST SERPL-MCNC: 123 MG/DL
CO2 SERPL-SCNC: 28 MMOL/L (ref 21–32)
CREAT SERPL-MCNC: 1 MG/DL (ref 0.8–1.5)
GLOBULIN SER CALC-MCNC: 3.1 G/DL (ref 2.8–4.5)
GLUCOSE SERPL-MCNC: 130 MG/DL (ref 65–100)
HDLC SERPL-MCNC: 43 MG/DL (ref 40–60)
HDLC SERPL: 2.9
LDLC SERPL CALC-MCNC: 21.2 MG/DL
POTASSIUM SERPL-SCNC: 4.4 MMOL/L (ref 3.5–5.1)
PROT SERPL-MCNC: 7.3 G/DL (ref 6.3–8.2)
SODIUM SERPL-SCNC: 140 MMOL/L (ref 133–143)
TRIGL SERPL-MCNC: 294 MG/DL (ref 35–150)
VLDLC SERPL CALC-MCNC: 58.8 MG/DL (ref 6–23)

## 2023-01-19 LAB — PSA SERPL DL<=0.01 NG/ML-MCNC: 0.08 NG/ML (ref 0–4)

## 2023-03-02 DIAGNOSIS — E11.9 TYPE 2 DIABETES MELLITUS WITHOUT COMPLICATION, WITHOUT LONG-TERM CURRENT USE OF INSULIN (HCC): Primary | ICD-10-CM

## 2023-03-03 ENCOUNTER — TELEPHONE (OUTPATIENT)
Dept: INTERNAL MEDICINE CLINIC | Facility: CLINIC | Age: 78
End: 2023-03-03

## 2023-03-03 RX ORDER — CARVEDILOL 25 MG/1
1 TABLET, FILM COATED ORAL DAILY
Qty: 100 EACH | Refills: 3 | Status: SHIPPED | OUTPATIENT
Start: 2023-03-03

## 2023-03-09 RX ORDER — ERGOCALCIFEROL 1.25 MG/1
CAPSULE ORAL
Qty: 12 CAPSULE | Refills: 3 | Status: SHIPPED | OUTPATIENT
Start: 2023-03-09

## 2023-03-10 DIAGNOSIS — I10 ESSENTIAL (PRIMARY) HYPERTENSION: ICD-10-CM

## 2023-03-10 DIAGNOSIS — E11.65 TYPE 2 DIABETES MELLITUS WITH HYPERGLYCEMIA, WITHOUT LONG-TERM CURRENT USE OF INSULIN (HCC): Primary | ICD-10-CM

## 2023-03-10 DIAGNOSIS — E78.5 DYSLIPIDEMIA: ICD-10-CM

## 2023-03-10 RX ORDER — LISINOPRIL 5 MG/1
TABLET ORAL
Qty: 90 TABLET | Refills: 4 | Status: SHIPPED | OUTPATIENT
Start: 2023-03-10

## 2023-03-10 RX ORDER — GLIPIZIDE 10 MG/1
TABLET ORAL
Qty: 270 TABLET | Refills: 4 | Status: SHIPPED | OUTPATIENT
Start: 2023-03-10

## 2023-03-10 RX ORDER — PRAVASTATIN SODIUM 20 MG
20 TABLET ORAL DAILY
Qty: 90 TABLET | Refills: 4 | Status: SHIPPED | OUTPATIENT
Start: 2023-03-10

## 2023-03-10 RX ORDER — DOXAZOSIN 8 MG/1
TABLET ORAL
Qty: 90 TABLET | Refills: 4 | Status: SHIPPED | OUTPATIENT
Start: 2023-03-10

## 2023-03-10 NOTE — TELEPHONE ENCOUNTER
Pt requests refills on dozasozin, lisinopril, glipizide, and pravastatin to [facility]. All rx's were previously sent in November, but pt no longer uses mail order pharmacy. Rx's pended.

## 2023-03-16 ENCOUNTER — APPOINTMENT (RX ONLY)
Dept: URBAN - METROPOLITAN AREA CLINIC 24 | Facility: CLINIC | Age: 78
Setting detail: DERMATOLOGY
End: 2023-03-16

## 2023-03-16 DIAGNOSIS — Z87.2 PERSONAL HISTORY OF DISEASES OF THE SKIN AND SUBCUTANEOUS TISSUE: ICD-10-CM

## 2023-03-16 DIAGNOSIS — L82.1 OTHER SEBORRHEIC KERATOSIS: ICD-10-CM

## 2023-03-16 DIAGNOSIS — D18.0 HEMANGIOMA: ICD-10-CM

## 2023-03-16 DIAGNOSIS — L57.8 OTHER SKIN CHANGES DUE TO CHRONIC EXPOSURE TO NONIONIZING RADIATION: ICD-10-CM

## 2023-03-16 DIAGNOSIS — L71.8 OTHER ROSACEA: ICD-10-CM

## 2023-03-16 PROBLEM — D18.01 HEMANGIOMA OF SKIN AND SUBCUTANEOUS TISSUE: Status: ACTIVE | Noted: 2023-03-16

## 2023-03-16 PROCEDURE — ? PRESCRIPTION MEDICATION MANAGEMENT

## 2023-03-16 PROCEDURE — ? SUNSCREEN RECOMMENDATIONS

## 2023-03-16 PROCEDURE — 99214 OFFICE O/P EST MOD 30 MIN: CPT

## 2023-03-16 PROCEDURE — ? COUNSELING

## 2023-03-16 ASSESSMENT — LOCATION DETAILED DESCRIPTION DERM
LOCATION DETAILED: RIGHT CENTRAL MALAR CHEEK
LOCATION DETAILED: EPIGASTRIC SKIN
LOCATION DETAILED: LEFT INFERIOR MEDIAL UPPER BACK
LOCATION DETAILED: RIGHT POSTERIOR SHOULDER
LOCATION DETAILED: RIGHT ANTERIOR PROXIMAL THIGH
LOCATION DETAILED: RIGHT SUPERIOR UPPER BACK
LOCATION DETAILED: LEFT CENTRAL MALAR CHEEK
LOCATION DETAILED: NASAL DORSUM
LOCATION DETAILED: LEFT ANTERIOR PROXIMAL THIGH
LOCATION DETAILED: SUPERIOR THORACIC SPINE

## 2023-03-16 ASSESSMENT — LOCATION SIMPLE DESCRIPTION DERM
LOCATION SIMPLE: LEFT THIGH
LOCATION SIMPLE: LEFT UPPER BACK
LOCATION SIMPLE: LEFT CHEEK
LOCATION SIMPLE: NOSE
LOCATION SIMPLE: RIGHT THIGH
LOCATION SIMPLE: ABDOMEN
LOCATION SIMPLE: RIGHT CHEEK
LOCATION SIMPLE: RIGHT SHOULDER
LOCATION SIMPLE: UPPER BACK
LOCATION SIMPLE: RIGHT UPPER BACK

## 2023-03-16 ASSESSMENT — LOCATION ZONE DERM
LOCATION ZONE: NOSE
LOCATION ZONE: LEG
LOCATION ZONE: FACE
LOCATION ZONE: TRUNK
LOCATION ZONE: ARM

## 2023-03-16 NOTE — PROCEDURE: PRESCRIPTION MEDICATION MANAGEMENT
Plan: The patient is being managed by another provider. I advised him to not take Doxycycline 100 daily. This is not good for gut health and long term. He should take for flares or be taking what we have given him previously, 50mg daily.
Detail Level: Zone
Render In Strict Bullet Format?: No

## 2023-03-27 DIAGNOSIS — Z95.0 BIVENTRICULAR CARDIAC PACEMAKER IN SITU: ICD-10-CM

## 2023-03-27 DIAGNOSIS — I50.22 SYSTOLIC CHF, CHRONIC (HCC): ICD-10-CM

## 2023-03-27 DIAGNOSIS — R00.1 SYMPTOMATIC BRADYCARDIA: ICD-10-CM

## 2023-04-20 ENCOUNTER — TELEPHONE (OUTPATIENT)
Dept: CARDIOLOGY CLINIC | Age: 78
End: 2023-04-20

## 2023-04-20 DIAGNOSIS — C73 MALIGNANT NEOPLASM OF THYROID GLAND (HCC): ICD-10-CM

## 2023-04-20 DIAGNOSIS — R06.09 DOE (DYSPNEA ON EXERTION): ICD-10-CM

## 2023-04-20 DIAGNOSIS — E55.9 VITAMIN D DEFICIENCY, UNSPECIFIED: ICD-10-CM

## 2023-04-20 DIAGNOSIS — Z86.39 HISTORY OF PRIMARY HYPERPARATHYROIDISM: ICD-10-CM

## 2023-04-20 DIAGNOSIS — E89.0 POSTPROCEDURAL HYPOTHYROIDISM: ICD-10-CM

## 2023-04-20 DIAGNOSIS — R06.09 DOE (DYSPNEA ON EXERTION): Primary | ICD-10-CM

## 2023-04-20 DIAGNOSIS — E55.9 VITAMIN D DEFICIENCY, UNSPECIFIED: Primary | ICD-10-CM

## 2023-04-20 LAB
25(OH)D3 SERPL-MCNC: 74.1 NG/ML (ref 30–100)
ANION GAP SERPL CALC-SCNC: 8 MMOL/L (ref 2–11)
BUN SERPL-MCNC: 27 MG/DL (ref 8–23)
CALCIUM SERPL-MCNC: 9.3 MG/DL (ref 8.3–10.4)
CALCIUM SERPL-MCNC: 9.6 MG/DL (ref 8.3–10.4)
CHLORIDE SERPL-SCNC: 104 MMOL/L (ref 101–110)
CO2 SERPL-SCNC: 24 MMOL/L (ref 21–32)
CREAT SERPL-MCNC: 1 MG/DL (ref 0.8–1.5)
GLUCOSE SERPL-MCNC: 163 MG/DL (ref 65–100)
POTASSIUM SERPL-SCNC: 3.6 MMOL/L (ref 3.5–5.1)
PTH-INTACT SERPL-MCNC: 30.8 PG/ML (ref 18.5–88)
SODIUM SERPL-SCNC: 136 MMOL/L (ref 133–143)
TSH W FREE THYROID IF ABNORMAL: 0.71 UIU/ML (ref 0.36–3.74)

## 2023-04-20 RX ORDER — METOPROLOL SUCCINATE 25 MG/1
25 TABLET, EXTENDED RELEASE ORAL DAILY
Qty: 30 TABLET | Refills: 5 | Status: SHIPPED | OUTPATIENT
Start: 2023-04-20 | End: 2023-04-21 | Stop reason: SDUPTHER

## 2023-04-21 ENCOUNTER — HOSPITAL ENCOUNTER (OUTPATIENT)
Dept: GENERAL RADIOLOGY | Age: 78
Discharge: HOME | End: 2023-04-24

## 2023-04-21 ENCOUNTER — TELEPHONE (OUTPATIENT)
Dept: CARDIOLOGY CLINIC | Age: 78
End: 2023-04-21

## 2023-04-21 DIAGNOSIS — R06.09 DOE (DYSPNEA ON EXERTION): ICD-10-CM

## 2023-04-21 LAB
NT PRO BNP: 15 PG/ML
THYROGLOB AB SERPL-ACNC: <1 IU/ML (ref 0–0.9)
THYROPEROXIDASE AB SERPL-ACNC: <9 IU/ML (ref 0–34)

## 2023-04-21 RX ORDER — METOPROLOL SUCCINATE 25 MG/1
25 TABLET, EXTENDED RELEASE ORAL DAILY
Qty: 30 TABLET | Refills: 5 | Status: SHIPPED | OUTPATIENT
Start: 2023-04-21

## 2023-04-21 NOTE — TELEPHONE ENCOUNTER
----- Message from Gisel Floyd MD sent at 4/21/2023  8:10 AM EDT -----  Patient and let him know that he does not appear to have any degree of heart failure. He should contact his primary care physician.   He may get echocardiogram/chest x-ray and follow-up me as we have discussed earlier

## 2023-04-24 ENCOUNTER — OFFICE VISIT (OUTPATIENT)
Dept: ENDOCRINOLOGY | Age: 78
End: 2023-04-24
Payer: MEDICARE

## 2023-04-24 VITALS
WEIGHT: 223 LBS | BODY MASS INDEX: 35.99 KG/M2 | HEART RATE: 83 BPM | DIASTOLIC BLOOD PRESSURE: 80 MMHG | OXYGEN SATURATION: 98 % | SYSTOLIC BLOOD PRESSURE: 114 MMHG

## 2023-04-24 DIAGNOSIS — C73 PAPILLARY THYROID CARCINOMA (HCC): ICD-10-CM

## 2023-04-24 DIAGNOSIS — E89.0 POSTSURGICAL HYPOTHYROIDISM: ICD-10-CM

## 2023-04-24 DIAGNOSIS — E55.9 VITAMIN D DEFICIENCY: ICD-10-CM

## 2023-04-24 DIAGNOSIS — C73 PAPILLARY THYROID CARCINOMA (HCC): Primary | ICD-10-CM

## 2023-04-24 DIAGNOSIS — M85.89 OSTEOPENIA OF MULTIPLE SITES: ICD-10-CM

## 2023-04-24 DIAGNOSIS — Z86.39 HISTORY OF PRIMARY HYPERPARATHYROIDISM: ICD-10-CM

## 2023-04-24 PROCEDURE — 3074F SYST BP LT 130 MM HG: CPT | Performed by: INTERNAL MEDICINE

## 2023-04-24 PROCEDURE — 3079F DIAST BP 80-89 MM HG: CPT | Performed by: INTERNAL MEDICINE

## 2023-04-24 PROCEDURE — G8427 DOCREV CUR MEDS BY ELIG CLIN: HCPCS | Performed by: INTERNAL MEDICINE

## 2023-04-24 PROCEDURE — G8417 CALC BMI ABV UP PARAM F/U: HCPCS | Performed by: INTERNAL MEDICINE

## 2023-04-24 PROCEDURE — 1036F TOBACCO NON-USER: CPT | Performed by: INTERNAL MEDICINE

## 2023-04-24 PROCEDURE — 99214 OFFICE O/P EST MOD 30 MIN: CPT | Performed by: INTERNAL MEDICINE

## 2023-04-24 PROCEDURE — 1123F ACP DISCUSS/DSCN MKR DOCD: CPT | Performed by: INTERNAL MEDICINE

## 2023-04-24 RX ORDER — ERGOCALCIFEROL 1.25 MG/1
CAPSULE ORAL
Qty: 12 CAPSULE | Refills: 3 | Status: SHIPPED | OUTPATIENT
Start: 2023-04-24

## 2023-04-24 RX ORDER — LEVOTHYROXINE SODIUM 0.15 MG/1
TABLET ORAL
Qty: 90 TABLET | Refills: 3 | Status: SHIPPED | OUTPATIENT
Start: 2023-04-24

## 2023-04-24 ASSESSMENT — ENCOUNTER SYMPTOMS
DIARRHEA: 0
CONSTIPATION: 0

## 2023-04-24 NOTE — PROGRESS NOTES
Steve Chen MD, [facility]  [address], [address] [facility], [address] [place] [address]  Phone [phone]  Facsimile [phone]          Annabel Ji is a 69 y.o. male seen 4/24/2023 for follow-up of thyroid cancer and hypothyroidism           ASSESSMENT AND PLAN:    1. Papillary thyroid carcinoma (HCC)  Multifocal bilateral micropapillary thyroid carcinoma with a dominant tumor in the right lobe measuring 0.8 cm status post total thyroidectomy 4/2019. There were no high risk features on final pathology and radioactive iodine remnant ablation was not recommended. Neck ultrasound negative 4/2022. His thyroglobulin level has been very low as expected. The lab did not run his thyroglobulin level with his most recent labs and this will be ordered today. Follow-up in 1 year with a thyroglobulin level prior to visit. 2. Postsurgical hypothyroidism  Goal TSH 0.4-2.0 (although 0.1-0.4 is acceptable in the setting of a normal free T4). - levothyroxine (SYNTHROID) 150 MCG tablet; 1 tablet once a day except for 1/2 tablet on Sundays  Dispense: 90 tablet; Refill: 3    3. History of primary hyperparathyroidism  Status post right superior parathyroidectomy 4/2019. His most recent serum calcium level was normal 4/2023. I will monitor him for recurrence once a year. 4. Vitamin D deficiency  Vitamin D level normal 4/2023.    - ergocalciferol (ERGOCALCIFEROL) 1.25 MG (71066 UT) capsule; Take 1 Capsule by mouth every seven (7) days. Dispense: 12 capsule; Refill: 3    5. Osteopenia of multiple sites  Bone density 5/2021 revealed an overall improvement in his osteopenia following parathyroidectomy. Follow-up and Dispositions    Return in about 1 year (around 4/24/2024).          HISTORY OF PRESENT ILLNESS:    THYROID CANCER     Presentation: Right thyroid nodule status post FNA biopsy revealing papillary thyroid carcinoma, status post total

## 2023-04-25 LAB
THYROGLOB AB SERPL-ACNC: <1 IU/ML (ref 0–0.9)
THYROGLOBULIN: 0.3 NG/ML (ref 1.4–29.2)

## 2023-05-25 ENCOUNTER — OFFICE VISIT (OUTPATIENT)
Age: 78
End: 2023-05-25

## 2023-05-25 VITALS
DIASTOLIC BLOOD PRESSURE: 72 MMHG | BODY MASS INDEX: 35.36 KG/M2 | HEART RATE: 74 BPM | WEIGHT: 220 LBS | SYSTOLIC BLOOD PRESSURE: 136 MMHG | HEIGHT: 66 IN

## 2023-05-25 DIAGNOSIS — E11.65 TYPE 2 DIABETES MELLITUS WITH HYPERGLYCEMIA, WITHOUT LONG-TERM CURRENT USE OF INSULIN (HCC): ICD-10-CM

## 2023-05-25 DIAGNOSIS — Z95.0 BIVENTRICULAR CARDIAC PACEMAKER IN SITU: ICD-10-CM

## 2023-05-25 DIAGNOSIS — I50.22 SYSTOLIC CHF, CHRONIC (HCC): ICD-10-CM

## 2023-05-25 DIAGNOSIS — E78.5 DYSLIPIDEMIA: ICD-10-CM

## 2023-05-25 DIAGNOSIS — I10 PRIMARY HYPERTENSION: Primary | ICD-10-CM

## 2023-05-25 PROBLEM — I44.2 ATRIOVENTRICULAR BLOCK, COMPLETE (HCC): Status: RESOLVED | Noted: 2023-01-17 | Resolved: 2023-05-25

## 2023-05-25 ASSESSMENT — ENCOUNTER SYMPTOMS
BACK PAIN: 0
ABDOMINAL PAIN: 0
SHORTNESS OF BREATH: 0
COUGH: 0

## 2023-05-25 NOTE — PROGRESS NOTES
[facility] CARDIOLOGY  [address], [address]      23      NAME:  Shavonne Gracia  : 1945  MRN: [id number]      SUBJECTIVE:   Shavonne Gracia is a 69 y.o. male seen for a follow up visit regarding the following:     Chief Complaint   Patient presents with    Hypertension    Congestive Heart Failure       HPI:    Patient with history of dyspnea and chest pain and normal echo and stress testing in . He developed CHB and symptomatic bradycardia s/p PM.  His echo showed severe reduction in EF that occurred in the face of chronic RV pacing. He had normal coronaries and EF normalized with BiV pacing. He is NYHA class II CHF. Echo 2023 was again normal with EF 55-60%. Patient was struggling with dyspnea last month. Chest x-ray with no acute cardiopulmonary disease. proBNP was 15. This was not consistent with heart failure. Past Medical History, Past Surgical History, Family history, Social History, and Medications were all reviewed with the patient today and updated as necessary. Current Outpatient Medications   Medication Sig Dispense Refill    levothyroxine (SYNTHROID) 150 MCG tablet 1 tablet once a day except for 1/2 tablet on Sundays 90 tablet 3    ergocalciferol (ERGOCALCIFEROL) 1.25 MG (91358 UT) capsule Take 1 Capsule by mouth every seven (7) days.  12 capsule 3    metoprolol succinate (TOPROL XL) 25 MG extended release tablet Take 1 tablet by mouth daily 30 tablet 5    doxazosin (CARDURA) 8 MG tablet TAKE 1 TABLET EVERY NIGHT FOR HIGH BLOOD PRESSURE AND ENLARGED PROSTATE WITH URINATION PROBLEM 90 tablet 4    lisinopril (PRINIVIL;ZESTRIL) 5 MG tablet TAKE 1 TABLET EVERY DAY 90 tablet 4    pravastatin (PRAVACHOL) 20 MG tablet Take 1 tablet by mouth daily 90 tablet 4    glipiZIDE (GLUCOTROL) 10 MG tablet TAKE 2 TABLETS IN THE MORNING AND 1 TABLET AT BEDTIME 270 tablet 4    blood glucose test strips (CONTOUR TEST) strip 1 each by Does

## 2023-06-27 ENCOUNTER — TELEPHONE (OUTPATIENT)
Age: 78
End: 2023-06-27

## 2023-06-27 DIAGNOSIS — I50.22 SYSTOLIC CHF, CHRONIC (HCC): Primary | ICD-10-CM

## 2023-07-12 ENCOUNTER — TELEPHONE (OUTPATIENT)
Dept: INTERNAL MEDICINE CLINIC | Facility: CLINIC | Age: 78
End: 2023-07-12

## 2023-07-12 DIAGNOSIS — E11.65 TYPE 2 DIABETES MELLITUS WITH HYPERGLYCEMIA, WITHOUT LONG-TERM CURRENT USE OF INSULIN (HCC): Primary | ICD-10-CM

## 2023-07-12 NOTE — TELEPHONE ENCOUNTER
Pt notified A1c ordered by Dr. Johanna Garcia. Pt will get labs for Dr. Colleen Chadwick after his appt with Dr. Johanna Garcia. Told him this was fine (can't get them earlier due to pre op time frame). Asked pt to make sure  is aware to only draw Dr. Jovani Almeida labs.

## 2023-07-12 NOTE — TELEPHONE ENCOUNTER
----- Message from Asya sent at 7/12/2023 12:30 PM EDT -----  Subject: Referral Request    Reason for referral request? Pt is calling the office to request labs for   him to get done down stairs before he comes to his appt. Please call pt   back when the are ordered. Provider patient wants to be referred to(if known):     Provider Phone Number(if known):     Additional Information for Provider?   ---------------------------------------------------------------------------  --------------  [address]    [phone]; OK to leave message on voicemail  ---------------------------------------------------------------------------  --------------

## 2023-07-13 DIAGNOSIS — E11.65 TYPE 2 DIABETES MELLITUS WITH HYPERGLYCEMIA, WITHOUT LONG-TERM CURRENT USE OF INSULIN (HCC): ICD-10-CM

## 2023-07-14 LAB
EST. AVERAGE GLUCOSE BLD GHB EST-MCNC: 143 MG/DL
HBA1C MFR BLD: 6.6 % (ref 4.8–5.6)

## 2023-07-17 ENCOUNTER — OFFICE VISIT (OUTPATIENT)
Dept: INTERNAL MEDICINE CLINIC | Facility: CLINIC | Age: 78
End: 2023-07-17
Payer: MEDICARE

## 2023-07-17 VITALS
DIASTOLIC BLOOD PRESSURE: 82 MMHG | HEART RATE: 72 BPM | SYSTOLIC BLOOD PRESSURE: 126 MMHG | HEIGHT: 66 IN | WEIGHT: 223 LBS | OXYGEN SATURATION: 97 % | BODY MASS INDEX: 35.84 KG/M2

## 2023-07-17 DIAGNOSIS — E11.9 TYPE 2 DIABETES MELLITUS WITHOUT COMPLICATION, WITHOUT LONG-TERM CURRENT USE OF INSULIN (HCC): Primary | ICD-10-CM

## 2023-07-17 DIAGNOSIS — C61 MALIGNANT NEOPLASM OF PROSTATE (HCC): ICD-10-CM

## 2023-07-17 DIAGNOSIS — C73 PAPILLARY THYROID CARCINOMA (HCC): ICD-10-CM

## 2023-07-17 DIAGNOSIS — E78.5 DYSLIPIDEMIA: ICD-10-CM

## 2023-07-17 DIAGNOSIS — Z95.0 BIVENTRICULAR CARDIAC PACEMAKER IN SITU: ICD-10-CM

## 2023-07-17 DIAGNOSIS — I10 HYPERTENSION, UNSPECIFIED TYPE: ICD-10-CM

## 2023-07-17 PROCEDURE — 99213 OFFICE O/P EST LOW 20 MIN: CPT | Performed by: INTERNAL MEDICINE

## 2023-07-17 PROCEDURE — G8427 DOCREV CUR MEDS BY ELIG CLIN: HCPCS | Performed by: INTERNAL MEDICINE

## 2023-07-17 PROCEDURE — G8417 CALC BMI ABV UP PARAM F/U: HCPCS | Performed by: INTERNAL MEDICINE

## 2023-07-17 PROCEDURE — 3044F HG A1C LEVEL LT 7.0%: CPT | Performed by: INTERNAL MEDICINE

## 2023-07-17 PROCEDURE — 1123F ACP DISCUSS/DSCN MKR DOCD: CPT | Performed by: INTERNAL MEDICINE

## 2023-07-17 PROCEDURE — 3079F DIAST BP 80-89 MM HG: CPT | Performed by: INTERNAL MEDICINE

## 2023-07-17 PROCEDURE — 1036F TOBACCO NON-USER: CPT | Performed by: INTERNAL MEDICINE

## 2023-07-17 PROCEDURE — 3074F SYST BP LT 130 MM HG: CPT | Performed by: INTERNAL MEDICINE

## 2023-07-17 SDOH — ECONOMIC STABILITY: FOOD INSECURITY: WITHIN THE PAST 12 MONTHS, YOU WORRIED THAT YOUR FOOD WOULD RUN OUT BEFORE YOU GOT MONEY TO BUY MORE.: NEVER TRUE

## 2023-07-17 SDOH — ECONOMIC STABILITY: HOUSING INSECURITY
IN THE LAST 12 MONTHS, WAS THERE A TIME WHEN YOU DID NOT HAVE A STEADY PLACE TO SLEEP OR SLEPT IN A SHELTER (INCLUDING NOW)?: NO

## 2023-07-17 SDOH — ECONOMIC STABILITY: FOOD INSECURITY: WITHIN THE PAST 12 MONTHS, THE FOOD YOU BOUGHT JUST DIDN'T LAST AND YOU DIDN'T HAVE MONEY TO GET MORE.: NEVER TRUE

## 2023-07-17 SDOH — ECONOMIC STABILITY: INCOME INSECURITY: HOW HARD IS IT FOR YOU TO PAY FOR THE VERY BASICS LIKE FOOD, HOUSING, MEDICAL CARE, AND HEATING?: NOT VERY HARD

## 2023-07-17 ASSESSMENT — ENCOUNTER SYMPTOMS: SHORTNESS OF BREATH: 0

## 2023-07-17 NOTE — PROGRESS NOTES
SUBJECTIVE:   Senthil Olivia is a 69 y.o. male seen for a visit regarding   Chief Complaint   Patient presents with    Diabetes     Pt here for 6 month f/u     Results     Labs were done 7/13/23        Diabetes  He presents for his follow-up diabetic visit. He has type 2 diabetes mellitus. His disease course has been stable. Pertinent negatives for diabetes include no chest pain. Symptoms are stable. Home blood sugar record trend: checks qd --after lunch 2h --150's ; after dinner about 200. (A1C now 6.6 ; was 7.6 in jan -changed diet around(junk food out)-alb neg in Jan)   Hyperlipidemia  This is a chronic problem. The current episode started more than 1 year ago. Condition status: LDL in 20's ; HDL 43 done 1/2023. Pertinent negatives include no chest pain or shortness of breath. Past Medical History, Past Surgical History, Family history, Social History, and Medications were all reviewed with the patient today and updated as necessary. Current Outpatient Medications   Medication Sig Dispense Refill    levothyroxine (SYNTHROID) 150 MCG tablet 1 tablet once a day except for 1/2 tablet on Sundays (Patient taking differently: Take 1 tablet by mouth Daily 1 tablet once a day except for 1/2 tablet on Sundays) 90 tablet 3    ergocalciferol (ERGOCALCIFEROL) 1.25 MG (88610 UT) capsule Take 1 Capsule by mouth every seven (7) days.  12 capsule 3    metoprolol succinate (TOPROL XL) 25 MG extended release tablet Take 1 tablet by mouth daily 30 tablet 5    doxazosin (CARDURA) 8 MG tablet TAKE 1 TABLET EVERY NIGHT FOR HIGH BLOOD PRESSURE AND ENLARGED PROSTATE WITH URINATION PROBLEM 90 tablet 4    lisinopril (PRINIVIL;ZESTRIL) 5 MG tablet TAKE 1 TABLET EVERY DAY (Patient taking differently: Take 1 tablet by mouth daily) 90 tablet 4    pravastatin (PRAVACHOL) 20 MG tablet Take 1 tablet by mouth daily 90 tablet 4    glipiZIDE (GLUCOTROL) 10 MG tablet TAKE 2 TABLETS IN THE MORNING AND 1 TABLET AT BEDTIME (Patient

## 2023-07-20 PROBLEM — Z45.010 ENCOUNTER FOR PACEMAKER AT END OF BATTERY LIFE: Status: ACTIVE | Noted: 2023-07-20

## 2023-07-28 DIAGNOSIS — I50.22 SYSTOLIC CHF, CHRONIC (HCC): ICD-10-CM

## 2023-07-28 LAB
BASOPHILS # BLD: 0.1 K/UL (ref 0–0.2)
BASOPHILS NFR BLD: 1 % (ref 0–2)
DIFFERENTIAL METHOD BLD: NORMAL
EOSINOPHIL # BLD: 0.2 K/UL (ref 0–0.8)
EOSINOPHIL NFR BLD: 2 % (ref 0.5–7.8)
ERYTHROCYTE [DISTWIDTH] IN BLOOD BY AUTOMATED COUNT: 14.3 % (ref 11.9–14.6)
HCT VFR BLD AUTO: 45.8 % (ref 41.1–50.3)
HGB BLD-MCNC: 14.6 G/DL (ref 13.6–17.2)
IMM GRANULOCYTES # BLD AUTO: 0 K/UL (ref 0–0.5)
IMM GRANULOCYTES NFR BLD AUTO: 0 % (ref 0–5)
INR PPP: 1
LYMPHOCYTES # BLD: 2 K/UL (ref 0.5–4.6)
LYMPHOCYTES NFR BLD: 26 % (ref 13–44)
MCH RBC QN AUTO: 26.8 PG (ref 26.1–32.9)
MCHC RBC AUTO-ENTMCNC: 31.9 G/DL (ref 31.4–35)
MCV RBC AUTO: 84 FL (ref 82–102)
MONOCYTES # BLD: 0.5 K/UL (ref 0.1–1.3)
MONOCYTES NFR BLD: 6 % (ref 4–12)
NEUTS SEG # BLD: 4.9 K/UL (ref 1.7–8.2)
NEUTS SEG NFR BLD: 65 % (ref 43–78)
NRBC # BLD: 0 K/UL (ref 0–0.2)
PLATELET # BLD AUTO: 190 K/UL (ref 150–450)
PMV BLD AUTO: 11.7 FL (ref 9.4–12.3)
PROTHROMBIN TIME: 13.3 SEC (ref 12.6–14.3)
RBC # BLD AUTO: 5.45 M/UL (ref 4.23–5.6)
WBC # BLD AUTO: 7.7 K/UL (ref 4.3–11.1)

## 2023-07-29 LAB
ANION GAP SERPL CALC-SCNC: 8 MMOL/L (ref 2–11)
BUN SERPL-MCNC: 20 MG/DL (ref 8–23)
CALCIUM SERPL-MCNC: 9 MG/DL (ref 8.3–10.4)
CHLORIDE SERPL-SCNC: 106 MMOL/L (ref 101–110)
CO2 SERPL-SCNC: 27 MMOL/L (ref 21–32)
CREAT SERPL-MCNC: 1.2 MG/DL (ref 0.8–1.5)
GLUCOSE SERPL-MCNC: 198 MG/DL (ref 65–100)
MAGNESIUM SERPL-MCNC: 2.2 MG/DL (ref 1.8–2.4)
POTASSIUM SERPL-SCNC: 4.1 MMOL/L (ref 3.5–5.1)
SODIUM SERPL-SCNC: 141 MMOL/L (ref 133–143)

## 2023-08-03 ENCOUNTER — ANESTHESIA EVENT (OUTPATIENT)
Dept: CARDIAC CATH/INVASIVE PROCEDURES | Age: 78
End: 2023-08-03
Payer: MEDICARE

## 2023-08-03 RX ORDER — MIDAZOLAM HYDROCHLORIDE 2 MG/2ML
2 INJECTION, SOLUTION INTRAMUSCULAR; INTRAVENOUS
Status: CANCELLED | OUTPATIENT
Start: 2023-08-03 | End: 2023-08-04

## 2023-08-03 RX ORDER — SODIUM CHLORIDE 0.9 % (FLUSH) 0.9 %
5-40 SYRINGE (ML) INJECTION EVERY 12 HOURS SCHEDULED
Status: CANCELLED | OUTPATIENT
Start: 2023-08-03

## 2023-08-03 RX ORDER — SCOLOPAMINE TRANSDERMAL SYSTEM 1 MG/1
1 PATCH, EXTENDED RELEASE TRANSDERMAL
Status: CANCELLED | OUTPATIENT
Start: 2023-08-03 | End: 2023-08-06

## 2023-08-03 RX ORDER — FENTANYL CITRATE 50 UG/ML
100 INJECTION, SOLUTION INTRAMUSCULAR; INTRAVENOUS
Status: CANCELLED | OUTPATIENT
Start: 2023-08-03 | End: 2023-08-04

## 2023-08-03 RX ORDER — SODIUM CHLORIDE, SODIUM LACTATE, POTASSIUM CHLORIDE, CALCIUM CHLORIDE 600; 310; 30; 20 MG/100ML; MG/100ML; MG/100ML; MG/100ML
INJECTION, SOLUTION INTRAVENOUS CONTINUOUS
Status: CANCELLED | OUTPATIENT
Start: 2023-08-03

## 2023-08-03 NOTE — PROGRESS NOTES
Patient pre-assessment complete for Generator Change with Dr. Laci Umaña scheduled for 23 at 2:30pm, arrival time 12:00pm. Patient verified using . Patient instructed to bring a list of home medications on the day of procedure. NPO status reinforced. Patient instructed to follow EP Information Sheet given at appointment. Patient verbalizes understanding of all instructions & denies any questions at this time.

## 2023-08-04 ENCOUNTER — HOSPITAL ENCOUNTER (OUTPATIENT)
Age: 78
Setting detail: OUTPATIENT SURGERY
Discharge: HOME | End: 2023-08-04
Attending: INTERNAL MEDICINE | Admitting: INTERNAL MEDICINE
Payer: MEDICARE

## 2023-08-04 ENCOUNTER — ANESTHESIA (OUTPATIENT)
Dept: CARDIAC CATH/INVASIVE PROCEDURES | Age: 78
End: 2023-08-04
Payer: MEDICARE

## 2023-08-04 VITALS
OXYGEN SATURATION: 98 % | SYSTOLIC BLOOD PRESSURE: 144 MMHG | RESPIRATION RATE: 16 BRPM | BODY MASS INDEX: 35.36 KG/M2 | TEMPERATURE: 98.2 F | HEART RATE: 68 BPM | HEIGHT: 66 IN | WEIGHT: 220 LBS | DIASTOLIC BLOOD PRESSURE: 90 MMHG

## 2023-08-04 DIAGNOSIS — Z45.010 ENCOUNTER FOR PACEMAKER AT END OF BATTERY LIFE: ICD-10-CM

## 2023-08-04 LAB
ECHO BSA: 2.16 M2
EKG ATRIAL RATE: 69 BPM
EKG DIAGNOSIS: NORMAL
EKG P AXIS: 59 DEGREES
EKG P-R INTERVAL: 172 MS
EKG Q-T INTERVAL: 472 MS
EKG QRS DURATION: 140 MS
EKG QTC CALCULATION (BAZETT): 505 MS
EKG R AXIS: -83 DEGREES
EKG T AXIS: 81 DEGREES
EKG VENTRICULAR RATE: 69 BPM
GLUCOSE BLD STRIP.AUTO-MCNC: 159 MG/DL (ref 65–100)
SERVICE CMNT-IMP: ABNORMAL

## 2023-08-04 PROCEDURE — 2500000003 HC RX 250 WO HCPCS: Performed by: INTERNAL MEDICINE

## 2023-08-04 PROCEDURE — 2580000003 HC RX 258: Performed by: INTERNAL MEDICINE

## 2023-08-04 PROCEDURE — 33228 REMV&REPLC PM GEN DUAL LEAD: CPT | Performed by: INTERNAL MEDICINE

## 2023-08-04 PROCEDURE — 3700000001 HC ADD 15 MINUTES (ANESTHESIA): Performed by: INTERNAL MEDICINE

## 2023-08-04 PROCEDURE — 93010 ELECTROCARDIOGRAM REPORT: CPT | Performed by: INTERNAL MEDICINE

## 2023-08-04 PROCEDURE — 82962 GLUCOSE BLOOD TEST: CPT

## 2023-08-04 PROCEDURE — C2621 PMKR, OTHER THAN SING/DUAL: HCPCS | Performed by: INTERNAL MEDICINE

## 2023-08-04 PROCEDURE — 6360000002 HC RX W HCPCS

## 2023-08-04 PROCEDURE — 2709999900 HC NON-CHARGEABLE SUPPLY: Performed by: INTERNAL MEDICINE

## 2023-08-04 PROCEDURE — 6360000002 HC RX W HCPCS: Performed by: INTERNAL MEDICINE

## 2023-08-04 PROCEDURE — 33229 REMV&REPLC PM GEN MULT LEADS: CPT | Performed by: INTERNAL MEDICINE

## 2023-08-04 PROCEDURE — 3700000000 HC ANESTHESIA ATTENDED CARE: Performed by: INTERNAL MEDICINE

## 2023-08-04 PROCEDURE — 93005 ELECTROCARDIOGRAM TRACING: CPT | Performed by: INTERNAL MEDICINE

## 2023-08-04 PROCEDURE — 2580000003 HC RX 258

## 2023-08-04 PROCEDURE — 2720000010 HC SURG SUPPLY STERILE: Performed by: INTERNAL MEDICINE

## 2023-08-04 DEVICE — CRT CARDIAC RESYNCHRONIZATION THERAPY PACEMAKER DDDRV
Type: IMPLANTABLE DEVICE | Status: FUNCTIONAL
Brand: QUADRA ALLURE MP™

## 2023-08-04 RX ORDER — LIDOCAINE HYDROCHLORIDE AND EPINEPHRINE 10; 10 MG/ML; UG/ML
INJECTION, SOLUTION INFILTRATION; PERINEURAL PRN
Status: DISCONTINUED | OUTPATIENT
Start: 2023-08-04 | End: 2023-08-04 | Stop reason: HOSPADM

## 2023-08-04 RX ORDER — METOCLOPRAMIDE HYDROCHLORIDE 5 MG/ML
10 INJECTION INTRAMUSCULAR; INTRAVENOUS
Status: DISCONTINUED | OUTPATIENT
Start: 2023-08-04 | End: 2023-08-04 | Stop reason: HOSPADM

## 2023-08-04 RX ORDER — HYDROMORPHONE HYDROCHLORIDE 2 MG/ML
0.5 INJECTION, SOLUTION INTRAMUSCULAR; INTRAVENOUS; SUBCUTANEOUS EVERY 10 MIN PRN
Status: DISCONTINUED | OUTPATIENT
Start: 2023-08-04 | End: 2023-08-04 | Stop reason: HOSPADM

## 2023-08-04 RX ORDER — PROPOFOL 10 MG/ML
INJECTION, EMULSION INTRAVENOUS PRN
Status: DISCONTINUED | OUTPATIENT
Start: 2023-08-04 | End: 2023-08-04 | Stop reason: SDUPTHER

## 2023-08-04 RX ORDER — PROPOFOL 10 MG/ML
INJECTION, EMULSION INTRAVENOUS CONTINUOUS PRN
Status: DISCONTINUED | OUTPATIENT
Start: 2023-08-04 | End: 2023-08-04 | Stop reason: SDUPTHER

## 2023-08-04 RX ORDER — FENTANYL CITRATE 50 UG/ML
25 INJECTION, SOLUTION INTRAMUSCULAR; INTRAVENOUS EVERY 5 MIN PRN
Status: DISCONTINUED | OUTPATIENT
Start: 2023-08-04 | End: 2023-08-04 | Stop reason: HOSPADM

## 2023-08-04 RX ORDER — SODIUM CHLORIDE, SODIUM LACTATE, POTASSIUM CHLORIDE, CALCIUM CHLORIDE 600; 310; 30; 20 MG/100ML; MG/100ML; MG/100ML; MG/100ML
INJECTION, SOLUTION INTRAVENOUS CONTINUOUS PRN
Status: DISCONTINUED | OUTPATIENT
Start: 2023-08-04 | End: 2023-08-04 | Stop reason: SDUPTHER

## 2023-08-04 RX ORDER — OXYCODONE HYDROCHLORIDE 5 MG/1
5 TABLET ORAL
Status: DISCONTINUED | OUTPATIENT
Start: 2023-08-04 | End: 2023-08-04 | Stop reason: HOSPADM

## 2023-08-04 RX ORDER — DIPHENHYDRAMINE HYDROCHLORIDE 50 MG/ML
12.5 INJECTION INTRAMUSCULAR; INTRAVENOUS
Status: DISCONTINUED | OUTPATIENT
Start: 2023-08-04 | End: 2023-08-04 | Stop reason: HOSPADM

## 2023-08-04 RX ORDER — SODIUM CHLORIDE, SODIUM LACTATE, POTASSIUM CHLORIDE, CALCIUM CHLORIDE 600; 310; 30; 20 MG/100ML; MG/100ML; MG/100ML; MG/100ML
INJECTION, SOLUTION INTRAVENOUS CONTINUOUS
Status: DISCONTINUED | OUTPATIENT
Start: 2023-08-04 | End: 2023-08-04 | Stop reason: HOSPADM

## 2023-08-04 RX ORDER — ONDANSETRON 2 MG/ML
4 INJECTION INTRAMUSCULAR; INTRAVENOUS
Status: DISCONTINUED | OUTPATIENT
Start: 2023-08-04 | End: 2023-08-04 | Stop reason: HOSPADM

## 2023-08-04 RX ADMIN — PROPOFOL 50 MG: 10 INJECTION, EMULSION INTRAVENOUS at 13:42

## 2023-08-04 RX ADMIN — Medication 2000 MG: at 13:37

## 2023-08-04 RX ADMIN — SODIUM CHLORIDE, SODIUM LACTATE, POTASSIUM CHLORIDE, AND CALCIUM CHLORIDE: 600; 310; 30; 20 INJECTION, SOLUTION INTRAVENOUS at 13:26

## 2023-08-04 RX ADMIN — PROPOFOL 50 MG: 10 INJECTION, EMULSION INTRAVENOUS at 13:31

## 2023-08-04 RX ADMIN — PROPOFOL 160 MCG/KG/MIN: 10 INJECTION, EMULSION INTRAVENOUS at 13:31

## 2023-08-04 NOTE — PROGRESS NOTES
TRANSFER - IN REPORT:    Verbal report received from [facility] on Jamaica Rosenthal  being received from EP lab for routine post-op      Report consisted of patient's Situation, Background, Assessment and   Recommendations(SBAR). Information from the following report(s) Nurse Handoff Report and Cardiac Rhythm AV paced  was reviewed with the receiving nurse. Opportunity for questions and clarification was provided. Assessment completed upon patient's arrival to unit and care assumed.

## 2023-08-04 NOTE — PROGRESS NOTES
Assisted OOB & ambulated to BR & on unit. Tolerated activity without difficulty. Left subclavian site without bleeding or hematoma. Discharge instructions reviewed with patient including post gen change care and medications side effects. Time allowed for questions and answers. INT removed with cath intact.

## 2023-08-04 NOTE — PROGRESS NOTES
Patient received to [facility] room # 9  Ambulatory from [facility]. Patient scheduled for gen change today with Dr Jennie Rosa. Procedure reviewed & questions answered, voiced good understanding consent obtained & placed on chart. All medications and medical history reviewed. Will prep patient per orders. Patient & family updated on plan of care. The patient has a fraility score of 3-MANAGING WELL, based on patient A&Ox3, patient able to ambulate to room without difficulty.

## 2023-08-04 NOTE — ANESTHESIA PRE PROCEDURE
Department of Anesthesiology  Preprocedure Note       Name:  Marilee Hernandez   Age:  69 y.o.  :  1945                                          MRN:  [id number]         Date:  2023      Surgeon: Keagan Arceo):  Prem Portillo MD    Procedure: Procedure(s):  Remove & replace PPM gen biv multi leads    Medications prior to admission:   Prior to Admission medications    Medication Sig Start Date End Date Taking? Authorizing Provider   levothyroxine (SYNTHROID) 150 MCG tablet 1 tablet once a day except for 1/2 tablet on Sundays  Patient taking differently: Take 1 tablet by mouth Daily 1 tablet once a day except for 1/2 tablet on Sundays 4/24/23   Raj Villalta MD   ergocalciferol (ERGOCALCIFEROL) 1.25 MG (99408 UT) capsule Take 1 Capsule by mouth every seven (7) days.  23   Raj Villalta MD   metoprolol succinate (TOPROL XL) 25 MG extended release tablet Take 1 tablet by mouth daily 23   Rae Jonas MD   doxazosin (CARDURA) 8 MG tablet TAKE 1 TABLET EVERY NIGHT FOR HIGH BLOOD PRESSURE AND ENLARGED PROSTATE WITH URINATION PROBLEM 3/10/23   Meri Thakkar MD   lisinopril (PRINIVIL;ZESTRIL) 5 MG tablet TAKE 1 TABLET EVERY DAY  Patient taking differently: Take 1 tablet by mouth daily 3/10/23   Meri Thakkar MD   pravastatin (PRAVACHOL) 20 MG tablet Take 1 tablet by mouth daily 3/10/23   W Cari Chacon MD   glipiZIDE (GLUCOTROL) 10 MG tablet TAKE 2 TABLETS IN THE MORNING AND 1 TABLET AT BEDTIME  Patient taking differently: Take 1 tablet by mouth 3 times daily TAKE 2 TABLETS IN THE MORNING AND 1 TABLET AT BEDTIME 3/10/23   Meri Thakkar MD   pioglitazone (ACTOS) 30 MG tablet Take 1 tablet by mouth daily 23   W Cari Chacon MD   blood glucose test strips (CONTOUR NEXT TEST) strip Testing BS one time a day as directed, E11.9 22   Historical Provider, MD   cetirizine (ZYRTEC) 10 MG tablet Take 1 tablet by mouth daily    Ar Automatic Reconciliation       Current medications:

## 2023-08-04 NOTE — H&P
8/30/2016    10/2014: Normal echo    Thyroid nodule 3/7/2019    scheduled for thyroid removal 4/18/19 and will start Levothyroxine      Past Surgical History:   Procedure Laterality Date    BIOPSY PROSTATE      CARPAL TUNNEL RELEASE      right    CATARACT REMOVAL  11/3 and 11/15    HEMORRHOID SURGERY      KNEE ARTHROSCOPY Right 07/2017    ORTHOPEDIC SURGERY Left 09/2017    broke left leg    ORTHOPEDIC SURGERY Right 12/2001    torn ligament off foot    ORTHOPEDIC SURGERY Left     thumb surgery    OTHER SURGICAL HISTORY      foot surgery    OTHER SURGICAL HISTORY      sinus    PACEMAKER  2015 and 2017    original in 2014 and device changed 2016    PARATHYROIDECTOMY  04/18/2019    [address] Short [employer]ze UNLISTED PROCEDURE CARDIAC SURGERY  8/15 and 2/17    pacemaker    PROSTATECTOMY  2015    biopsy & radiation    THYROIDECTOMY  04/18/2019    total thyroidectomy- Vi Numbers      as a child    TUMOR REMOVAL      L thigh benign     Allergies   Allergen Reactions    Metformin And Related Diarrhea      Family History   Problem Relation Age of Onset    Cancer Mother     Hypertension Mother     Heart Disease Mother     Cancer Father     Heart Disease Father         MI    Coronary Art Dis Other         family history        No current facility-administered medications for this encounter. Current Outpatient Medications   Medication Sig    levothyroxine (SYNTHROID) 150 MCG tablet 1 tablet once a day except for 1/2 tablet on Sundays (Patient taking differently: Take 1 tablet by mouth Daily 1 tablet once a day except for 1/2 tablet on Sundays)    ergocalciferol (ERGOCALCIFEROL) 1.25 MG (95271 UT) capsule Take 1 Capsule by mouth every seven (7) days.     metoprolol succinate (TOPROL XL) 25 MG extended release tablet Take 1 tablet by mouth daily    doxazosin (CARDURA) 8 MG tablet TAKE 1 TABLET EVERY NIGHT FOR HIGH BLOOD PRESSURE AND ENLARGED PROSTATE WITH URINATION PROBLEM    lisinopril (PRINIVIL;ZESTRIL) 5 MG tablet TAKE 1 TABLET EVERY DAY (Patient taking differently: Take 1 tablet by mouth daily)    pravastatin (PRAVACHOL) 20 MG tablet Take 1 tablet by mouth daily    glipiZIDE (GLUCOTROL) 10 MG tablet TAKE 2 TABLETS IN THE MORNING AND 1 TABLET AT BEDTIME (Patient taking differently: Take 1 tablet by mouth 3 times daily TAKE 2 TABLETS IN THE MORNING AND 1 TABLET AT BEDTIME)    pioglitazone (ACTOS) 30 MG tablet Take 1 tablet by mouth daily    blood glucose test strips (CONTOUR NEXT TEST) strip Testing BS one time a day as directed, E11.9    cetirizine (ZYRTEC) 10 MG tablet Take 1 tablet by mouth daily      Physical Exam  There were no vitals filed for this visit. Physical Exam:  General appearance - Alert, well appearing, and in no distress   Neck - Carotids upstroke normal bilaterally, no bruits, no JVD. Resp - Clear to auscultation, no wheezes, rales or rhonchi, symmetric air entry. Heart - Normal rate, regular rhythm, normal S1, S2, no murmurs, rubs, clicks or gallops. GI - Soft, nontender, nondistended      Cardiographics    Telemetry:   ECG (Indpendently visualized and interpreted):  Echocardiogram:     Labs: No results for input(s): NA, K, MG, BUN, CREA, GLU, WBC, HGB, HCT, PLT, INR, TRIGL, LDL, HDL in the last 72 hours. Invalid input(s): TROPI, TCHOL     Assessment:      Principal Problem:    Encounter for pacemaker at end of battery life  Resolved Problems:    * No resolved hospital problems. *         Plan:   1. BiV PPM CARMEN: I had a discussion today with the patient regarding the risks, benefits, and alternatives of a device generator change. I discussed with the patient the potential risks of PM gen change, including the risk of bleeding, infection, or exposure of a lead integrity issue. I explained there is currently no evidence of a lead problem, but if something were to be exposed during the procedure would plan for lead replacement.  If a new lead needed to be placed, there are risks of large blood vessel

## 2023-08-18 ENCOUNTER — NURSE ONLY (OUTPATIENT)
Age: 78
End: 2023-08-18

## 2023-08-18 DIAGNOSIS — I50.22 CHRONIC SYSTOLIC (CONGESTIVE) HEART FAILURE (HCC): Primary | ICD-10-CM

## 2023-08-29 DIAGNOSIS — I50.22 SYSTOLIC CHF, CHRONIC (HCC): ICD-10-CM

## 2023-08-29 DIAGNOSIS — Z95.0 BIVENTRICULAR CARDIAC PACEMAKER IN SITU: Primary | Chronic | ICD-10-CM

## 2023-10-11 ENCOUNTER — OFFICE VISIT (OUTPATIENT)
Dept: ORTHOPEDIC SURGERY | Age: 78
End: 2023-10-11

## 2023-10-11 DIAGNOSIS — M65.331 TRIGGER FINGER, RIGHT MIDDLE FINGER: ICD-10-CM

## 2023-10-11 DIAGNOSIS — M79.644 PAIN OF RIGHT THUMB: Primary | ICD-10-CM

## 2023-10-11 DIAGNOSIS — M18.11 ARTHRITIS OF CARPOMETACARPAL (CMC) JOINT OF RIGHT THUMB: ICD-10-CM

## 2023-10-11 RX ORDER — BETAMETHASONE SODIUM PHOSPHATE AND BETAMETHASONE ACETATE 3; 3 MG/ML; MG/ML
6 INJECTION, SUSPENSION INTRA-ARTICULAR; INTRALESIONAL; INTRAMUSCULAR; SOFT TISSUE ONCE
Status: COMPLETED | OUTPATIENT
Start: 2023-10-11 | End: 2023-10-11

## 2023-10-11 RX ORDER — MELOXICAM 15 MG/1
15 TABLET ORAL DAILY
Qty: 28 TABLET | Refills: 0 | Status: SHIPPED | OUTPATIENT
Start: 2023-10-11 | End: 2023-11-08

## 2023-10-11 RX ADMIN — BETAMETHASONE SODIUM PHOSPHATE AND BETAMETHASONE ACETATE 6 MG: 3; 3 INJECTION, SUSPENSION INTRA-ARTICULAR; INTRALESIONAL; INTRAMUSCULAR; SOFT TISSUE at 11:58

## 2023-10-11 NOTE — PROGRESS NOTES
Patient was fit for a CMC splint for patients right hand/joint. I demonstrated that the thumb slides into the opening and Velcro on the dorsal side of the hand. The strap continues around the thumb and Velcro's again on the ventral side of hand or palm, and then continues through the thumb and first finger to Velcro again on the dorsal side of hand. Patient read and signed documenting they understand and agree to [facility]'s current DME return policy.
ASPIR&/INJ SMALL JT/BURSA W/O US          Plan:  We discussed the diagnosis and different treatment options. We discussed observation, day/night splinting, cortisone injection(s) and surgical reconstruction with trapeziectomy and suspension arthroplasty and the risks and benefits of all were clearly outlined. We discussed the fact that the vast majority of thumb CMC arthritis causes persistent chronic pain and that surgical reconstruction is the endpoint for patients whose symptoms are not properly alleviated with conservative measures but the vast majority of patients end up having surgery. After discussing all options in detail the patient elects to proceed with right middle trigger finger injection right thumb CMC injection. We will give him Mobic and Voltaren. Give him a figure-of-eight brace and a Comfort Cool brace. I will reassess him back as needed. Procedure Note    The risk, benefits and alternatives of injection and no injection therapy were discussed. Risks including skin blanching, subcutaneous fat atrophy, and elevations in blood glucose levels were discussed. The patient consented for an injection. The patient has been identified by name and birthdate. The injection site was identified, marked and prepped with a alcohol swab. Time out completed. The A1 pulley of the right middle finger was/were injected with a 25 gauge needle with 1ml of 6mg/ml Celestone and 1ml xylocaine plain 1%. The injection site was then dressed with a bandaid. The patient tolerated the injection well. The patient was instructed to monitor their blood sugars if diabetic and call if any concerns. The patient was instructed to call the office if any adverse local effects occurred or any if any questions or concerns arise. Procedure Note    The risk, benefits and alternatives of injection and no injection therapy were discussed.  Risks including skin blanching, subcutaneous fat atrophy, and elevations in blood glucose

## 2023-10-13 RX ORDER — MELOXICAM 15 MG/1
15 TABLET ORAL DAILY
Qty: 28 TABLET | Refills: 0 | OUTPATIENT
Start: 2023-10-13

## 2023-11-10 RX ORDER — MELOXICAM 15 MG/1
15 TABLET ORAL DAILY
Qty: 28 TABLET | Refills: 0 | OUTPATIENT
Start: 2023-11-10

## 2023-11-28 ENCOUNTER — TELEPHONE (OUTPATIENT)
Dept: ORTHOPEDIC SURGERY | Age: 78
End: 2023-11-28

## 2023-11-28 DIAGNOSIS — M18.11 ARTHRITIS OF CARPOMETACARPAL (CMC) JOINT OF RIGHT THUMB: ICD-10-CM

## 2023-11-28 DIAGNOSIS — M65.331 TRIGGER FINGER, RIGHT MIDDLE FINGER: Primary | ICD-10-CM

## 2023-11-28 RX ORDER — MELOXICAM 15 MG/1
15 TABLET ORAL DAILY
Qty: 28 TABLET | Refills: 0 | Status: SHIPPED | OUTPATIENT
Start: 2023-11-28 | End: 2023-12-26

## 2023-11-28 NOTE — TELEPHONE ENCOUNTER
He ran out of the meloxicam. He said it helped more than he thought because since he ran out his fingers have been hurting more. Can he get a refill? He says its also in the finger next to the one that you treated.  [facility].

## 2023-12-01 ENCOUNTER — OFFICE VISIT (OUTPATIENT)
Age: 78
End: 2023-12-01
Payer: MEDICARE

## 2023-12-01 VITALS
SYSTOLIC BLOOD PRESSURE: 136 MMHG | DIASTOLIC BLOOD PRESSURE: 74 MMHG | HEIGHT: 66 IN | WEIGHT: 220 LBS | BODY MASS INDEX: 35.36 KG/M2 | HEART RATE: 62 BPM

## 2023-12-01 DIAGNOSIS — Z95.0 BIVENTRICULAR CARDIAC PACEMAKER IN SITU: Chronic | ICD-10-CM

## 2023-12-01 DIAGNOSIS — E78.5 DYSLIPIDEMIA: ICD-10-CM

## 2023-12-01 DIAGNOSIS — I50.22 SYSTOLIC CHF, CHRONIC (HCC): Primary | ICD-10-CM

## 2023-12-01 DIAGNOSIS — I10 PRIMARY HYPERTENSION: ICD-10-CM

## 2023-12-01 DIAGNOSIS — E11.65 TYPE 2 DIABETES MELLITUS WITH HYPERGLYCEMIA, WITHOUT LONG-TERM CURRENT USE OF INSULIN (HCC): ICD-10-CM

## 2023-12-01 PROCEDURE — G8417 CALC BMI ABV UP PARAM F/U: HCPCS | Performed by: INTERNAL MEDICINE

## 2023-12-01 PROCEDURE — 99214 OFFICE O/P EST MOD 30 MIN: CPT | Performed by: INTERNAL MEDICINE

## 2023-12-01 PROCEDURE — 1123F ACP DISCUSS/DSCN MKR DOCD: CPT | Performed by: INTERNAL MEDICINE

## 2023-12-01 PROCEDURE — 3078F DIAST BP <80 MM HG: CPT | Performed by: INTERNAL MEDICINE

## 2023-12-01 PROCEDURE — G8484 FLU IMMUNIZE NO ADMIN: HCPCS | Performed by: INTERNAL MEDICINE

## 2023-12-01 PROCEDURE — 1036F TOBACCO NON-USER: CPT | Performed by: INTERNAL MEDICINE

## 2023-12-01 PROCEDURE — 3075F SYST BP GE 130 - 139MM HG: CPT | Performed by: INTERNAL MEDICINE

## 2023-12-01 PROCEDURE — 3044F HG A1C LEVEL LT 7.0%: CPT | Performed by: INTERNAL MEDICINE

## 2023-12-01 PROCEDURE — G8427 DOCREV CUR MEDS BY ELIG CLIN: HCPCS | Performed by: INTERNAL MEDICINE

## 2023-12-01 ASSESSMENT — ENCOUNTER SYMPTOMS
BACK PAIN: 0
ABDOMINAL PAIN: 0
COUGH: 0
SHORTNESS OF BREATH: 0

## 2023-12-01 NOTE — PROGRESS NOTES
[facility] CARDIOLOGY  [address] [address]      23      NAME:  Tsering Cid  : 1945  MRN: [id number]      SUBJECTIVE:   Tsering Cid is a 69 y.o. male seen for a follow up visit regarding the following:     Chief Complaint   Patient presents with    Hypertension    Congestive Heart Failure       HPI:    69 y.o. male with history of dyspnea and chest pain and normal echo and stress testing in . He developed CHB and symptomatic bradycardia s/p PM.  His echo showed severe reduction in EF that occurred in the face of chronic RV pacing. He had normal coronaries and EF normalized with BiV pacing. He is NYHA class II CHF. Echo 2023 was again normal with EF 55-60%. Patient has struggled with dyspnea. Chest x-ray with no acute cardiopulmonary disease. proBNP was 15. This was not consistent with heart failure. Past Medical History, Past Surgical History, Family history, Social History, and Medications were all reviewed with the patient today and updated as necessary. Current Outpatient Medications   Medication Sig Dispense Refill    meloxicam (MOBIC) 15 MG tablet Take 1 tablet by mouth daily for 28 days 28 tablet 0    meloxicam (MOBIC) 15 MG tablet Take 1 tablet by mouth daily for 28 days 28 tablet 0    diclofenac sodium (VOLTAREN) 1 % GEL Apply 2 g topically 3 times daily for 28 days 168 g 0    levothyroxine (SYNTHROID) 150 MCG tablet 1 tablet once a day except for 1/2 tablet on Sundays (Patient taking differently: Take 1 tablet by mouth Daily 1 tablet once a day except for 1/2 tablet on Sundays) 90 tablet 3    ergocalciferol (ERGOCALCIFEROL) 1.25 MG (45722 UT) capsule Take 1 Capsule by mouth every seven (7) days.  12 capsule 3    metoprolol succinate (TOPROL XL) 25 MG extended release tablet Take 1 tablet by mouth daily 30 tablet 5    doxazosin (CARDURA) 8 MG tablet TAKE 1 TABLET EVERY NIGHT FOR HIGH BLOOD PRESSURE AND ENLARGED

## 2023-12-06 RX ORDER — METOPROLOL SUCCINATE 25 MG/1
25 TABLET, EXTENDED RELEASE ORAL DAILY
Qty: 90 TABLET | Refills: 3 | Status: SHIPPED | OUTPATIENT
Start: 2023-12-06

## 2023-12-06 NOTE — TELEPHONE ENCOUNTER
MEDICATION REFILL REQUEST    Please call when filled      Name of Medication:  Metoprolol  Dose:  25 mg  Frequency:     Quantity:     Days' supply:             Pharmacy Name/Location:  [facility] in [facility]

## 2024-01-10 ENCOUNTER — TELEPHONE (OUTPATIENT)
Dept: INTERNAL MEDICINE CLINIC | Facility: CLINIC | Age: 79
End: 2024-01-10

## 2024-01-10 NOTE — TELEPHONE ENCOUNTER
Patient notified appointment on 1/26/24 will be NEW To Provider not AWV and labs will be ordered at appointment time. Patient verbalized understanding

## 2024-01-10 NOTE — TELEPHONE ENCOUNTER
Please release his AWE lab order downstairs.  His AWE appointment will be on 1/26/24 with Dr. Villeda.

## 2024-02-08 DIAGNOSIS — E55.9 VITAMIN D DEFICIENCY: ICD-10-CM

## 2024-02-08 RX ORDER — ERGOCALCIFEROL 1.25 MG/1
CAPSULE ORAL
Qty: 12 CAPSULE | Refills: 3 | Status: SHIPPED | OUTPATIENT
Start: 2024-02-08

## 2024-02-27 ENCOUNTER — OFFICE VISIT (OUTPATIENT)
Dept: FAMILY MEDICINE CLINIC | Facility: CLINIC | Age: 79
End: 2024-02-27

## 2024-02-27 VITALS
HEART RATE: 88 BPM | WEIGHT: 222.9 LBS | DIASTOLIC BLOOD PRESSURE: 80 MMHG | SYSTOLIC BLOOD PRESSURE: 118 MMHG | OXYGEN SATURATION: 99 % | TEMPERATURE: 98.2 F | RESPIRATION RATE: 17 BRPM | HEIGHT: 66 IN | BODY MASS INDEX: 35.82 KG/M2

## 2024-02-27 DIAGNOSIS — E11.9 TYPE 2 DIABETES MELLITUS WITHOUT COMPLICATION, WITHOUT LONG-TERM CURRENT USE OF INSULIN (HCC): Primary | ICD-10-CM

## 2024-02-27 DIAGNOSIS — I50.22 SYSTOLIC CHF, CHRONIC (HCC): ICD-10-CM

## 2024-02-27 DIAGNOSIS — I10 ESSENTIAL HYPERTENSION: ICD-10-CM

## 2024-02-27 DIAGNOSIS — E66.01 SEVERE OBESITY (BMI 35.0-39.9) WITH COMORBIDITY (HCC): ICD-10-CM

## 2024-02-27 DIAGNOSIS — C61 MALIGNANT NEOPLASM OF PROSTATE (HCC): ICD-10-CM

## 2024-02-27 DIAGNOSIS — Z12.5 SCREENING FOR MALIGNANT NEOPLASM OF PROSTATE: ICD-10-CM

## 2024-02-27 DIAGNOSIS — Z76.89 ENCOUNTER TO ESTABLISH CARE WITH NEW DOCTOR: ICD-10-CM

## 2024-02-27 DIAGNOSIS — C73 PAPILLARY THYROID CARCINOMA (HCC): ICD-10-CM

## 2024-02-27 DIAGNOSIS — E78.5 DYSLIPIDEMIA: ICD-10-CM

## 2024-02-27 DIAGNOSIS — E89.0 POSTSURGICAL HYPOTHYROIDISM: ICD-10-CM

## 2024-02-27 DIAGNOSIS — E11.9 TYPE 2 DIABETES MELLITUS WITHOUT COMPLICATION, WITHOUT LONG-TERM CURRENT USE OF INSULIN (HCC): ICD-10-CM

## 2024-02-27 DIAGNOSIS — Z95.0 BIVENTRICULAR CARDIAC PACEMAKER IN SITU: Chronic | ICD-10-CM

## 2024-02-27 LAB
ALBUMIN SERPL-MCNC: 4 G/DL (ref 3.2–4.6)
ALBUMIN/GLOB SERPL: 1.2 (ref 0.4–1.6)
ALP SERPL-CCNC: 50 U/L (ref 50–136)
ALT SERPL-CCNC: 10 U/L (ref 12–65)
ANION GAP SERPL CALC-SCNC: 8 MMOL/L (ref 2–11)
AST SERPL-CCNC: 13 U/L (ref 15–37)
BILIRUB SERPL-MCNC: 0.5 MG/DL (ref 0.2–1.1)
BUN SERPL-MCNC: 19 MG/DL (ref 8–23)
CALCIUM SERPL-MCNC: 9.7 MG/DL (ref 8.3–10.4)
CHLORIDE SERPL-SCNC: 104 MMOL/L (ref 103–113)
CHOLEST SERPL-MCNC: 122 MG/DL
CO2 SERPL-SCNC: 27 MMOL/L (ref 21–32)
CREAT SERPL-MCNC: 0.9 MG/DL (ref 0.8–1.5)
CREAT UR-MCNC: 218 MG/DL
GLOBULIN SER CALC-MCNC: 3.3 G/DL (ref 2.8–4.5)
GLUCOSE SERPL-MCNC: 119 MG/DL (ref 65–100)
HDLC SERPL-MCNC: 45 MG/DL (ref 40–60)
HDLC SERPL: 2.7
LDLC SERPL CALC-MCNC: 26.2 MG/DL
MICROALBUMIN UR-MCNC: 2.66 MG/DL
MICROALBUMIN/CREAT UR-RTO: 12 MG/G (ref 0–30)
POTASSIUM SERPL-SCNC: 4 MMOL/L (ref 3.5–5.1)
PROT SERPL-MCNC: 7.3 G/DL (ref 6.3–8.2)
PSA SERPL-MCNC: 0.1 NG/ML
SODIUM SERPL-SCNC: 139 MMOL/L (ref 136–146)
TRIGL SERPL-MCNC: 254 MG/DL (ref 35–150)
VLDLC SERPL CALC-MCNC: 50.8 MG/DL (ref 6–23)

## 2024-02-27 RX ORDER — PIOGLITAZONEHYDROCHLORIDE 30 MG/1
30 TABLET ORAL DAILY
Qty: 90 TABLET | Refills: 3 | Status: SHIPPED | OUTPATIENT
Start: 2024-02-27

## 2024-02-27 SDOH — ECONOMIC STABILITY: FOOD INSECURITY: WITHIN THE PAST 12 MONTHS, YOU WORRIED THAT YOUR FOOD WOULD RUN OUT BEFORE YOU GOT MONEY TO BUY MORE.: NEVER TRUE

## 2024-02-27 SDOH — ECONOMIC STABILITY: FOOD INSECURITY: WITHIN THE PAST 12 MONTHS, THE FOOD YOU BOUGHT JUST DIDN'T LAST AND YOU DIDN'T HAVE MONEY TO GET MORE.: NEVER TRUE

## 2024-02-27 SDOH — ECONOMIC STABILITY: INCOME INSECURITY: HOW HARD IS IT FOR YOU TO PAY FOR THE VERY BASICS LIKE FOOD, HOUSING, MEDICAL CARE, AND HEATING?: NOT HARD AT ALL

## 2024-02-27 ASSESSMENT — PATIENT HEALTH QUESTIONNAIRE - PHQ9
SUM OF ALL RESPONSES TO PHQ9 QUESTIONS 1 & 2: 0
SUM OF ALL RESPONSES TO PHQ QUESTIONS 1-9: 0
1. LITTLE INTEREST OR PLEASURE IN DOING THINGS: 0
SUM OF ALL RESPONSES TO PHQ QUESTIONS 1-9: 0
2. FEELING DOWN, DEPRESSED OR HOPELESS: 0

## 2024-02-27 NOTE — PROGRESS NOTES
increasing fiber content in diet. Recommened watching the \"[place]\" documentary  - Also discussed using online apps like my fitness pal to measure caloric intake and restricting it to about 2200 landy/day    5. Biventricular cardiac pacemaker in situ  Chronic systolic congestive heart failure   Discussions with patient, review of EMR and cardiology notes show history of chest pain and dyspnea in 2014, developed a CHB and symptomatic bradycardia status post pacemaker.  Echo showed a reduction in EF that occurred in the setting of chronic right ventricular pacing.  Normal coronaries and EF normalized with biventricular pacing.  He is a NYHA class II CHF.  Recent echo from 5/2023 showing a normal EF of 55 to 60%.    - Follows with cardiology, modulation of medical management per cardiology recommendations; Continue metoprolol succinate 25mg and lisinopril 5mg daily.      6. Dyslipidemia  LDL from January 2023 at 21.2, cholesterol at 23  Controlled on his current medication regimen of pravastatin 20, continue     7. Papillary thyroid carcinoma (HCC); 9. Postsurgical hypothyroidism  Thyroglobulin antibody from April 2023 less than 1 ;  Sees endo for hx papillary thyroid cancer-thyroidectomy 4/2019-no other rx-sees Dr Solomon   Review of EMR showing papillary thyroid carcinoma, status post total thyroidectomy 4/18/2019 (pathology revealed multifocal, bilateral papillary thyroid carcinoma with the tumor in the right lobe measuring 0.8 cm and the tumor in the left lobe measuring 0.15 cm, negative margins, no vascular invasion, no lymphatic invasion, no extrathyroidal extension levothyroxine  -Stable on his current medication regimen of levothyroxine 150 mcg, continue    8. Malignant neoplasm of prostate (HCC)  PSA 0.08 done 1/2023-had SBRT in 2016 for jose 7; Follows with Dr Gomez [facility]  -PSA ordered    No follow-ups on file.     Subjective   SUBJECTIVE/OBJECTIVE:  HPI  Mr. Abner Gabriel is a pleasant

## 2024-02-28 LAB
EST. AVERAGE GLUCOSE BLD GHB EST-MCNC: 151 MG/DL
HBA1C MFR BLD: 6.9 % (ref 4.8–5.6)

## 2024-03-21 ENCOUNTER — APPOINTMENT (RX ONLY)
Dept: URBAN - METROPOLITAN AREA CLINIC 24 | Facility: CLINIC | Age: 79
Setting detail: DERMATOLOGY
End: 2024-03-21

## 2024-03-21 DIAGNOSIS — Z87.2 PERSONAL HISTORY OF DISEASES OF THE SKIN AND SUBCUTANEOUS TISSUE: ICD-10-CM

## 2024-03-21 DIAGNOSIS — D18.0 HEMANGIOMA: ICD-10-CM

## 2024-03-21 DIAGNOSIS — L82.1 OTHER SEBORRHEIC KERATOSIS: ICD-10-CM

## 2024-03-21 DIAGNOSIS — L82.0 INFLAMED SEBORRHEIC KERATOSIS: ICD-10-CM

## 2024-03-21 DIAGNOSIS — L57.8 OTHER SKIN CHANGES DUE TO CHRONIC EXPOSURE TO NONIONIZING RADIATION: ICD-10-CM | Status: INADEQUATELY CONTROLLED

## 2024-03-21 DIAGNOSIS — Z71.89 OTHER SPECIFIED COUNSELING: ICD-10-CM

## 2024-03-21 DIAGNOSIS — L57.0 ACTINIC KERATOSIS: ICD-10-CM

## 2024-03-21 PROBLEM — D18.01 HEMANGIOMA OF SKIN AND SUBCUTANEOUS TISSUE: Status: ACTIVE | Noted: 2024-03-21

## 2024-03-21 PROCEDURE — ? COUNSELING

## 2024-03-21 PROCEDURE — 17000 DESTRUCT PREMALG LESION: CPT | Mod: 59

## 2024-03-21 PROCEDURE — 17110 DESTRUCTION B9 LES UP TO 14: CPT

## 2024-03-21 PROCEDURE — 99213 OFFICE O/P EST LOW 20 MIN: CPT | Mod: 25

## 2024-03-21 PROCEDURE — 17003 DESTRUCT PREMALG LES 2-14: CPT | Mod: 59

## 2024-03-21 PROCEDURE — ? SUNSCREEN RECOMMENDATIONS

## 2024-03-21 PROCEDURE — ? LIQUID NITROGEN

## 2024-03-21 ASSESSMENT — LOCATION SIMPLE DESCRIPTION DERM
LOCATION SIMPLE: RIGHT THIGH
LOCATION SIMPLE: ABDOMEN
LOCATION SIMPLE: GROIN
LOCATION SIMPLE: RIGHT SHOULDER
LOCATION SIMPLE: UPPER BACK
LOCATION SIMPLE: LEFT THIGH
LOCATION SIMPLE: RIGHT UPPER BACK
LOCATION SIMPLE: RIGHT ZYGOMA
LOCATION SIMPLE: LEFT UPPER BACK

## 2024-03-21 ASSESSMENT — LOCATION DETAILED DESCRIPTION DERM
LOCATION DETAILED: SUPERIOR THORACIC SPINE
LOCATION DETAILED: LEFT INFERIOR MEDIAL UPPER BACK
LOCATION DETAILED: RIGHT POSTERIOR SHOULDER
LOCATION DETAILED: EPIGASTRIC SKIN
LOCATION DETAILED: LEFT INGUINAL CREASE
LOCATION DETAILED: RIGHT ANTERIOR PROXIMAL THIGH
LOCATION DETAILED: RIGHT SUPERIOR UPPER BACK
LOCATION DETAILED: RIGHT MEDIAL ZYGOMA
LOCATION DETAILED: LEFT ANTERIOR PROXIMAL THIGH

## 2024-03-21 ASSESSMENT — LOCATION ZONE DERM
LOCATION ZONE: LEG
LOCATION ZONE: TRUNK
LOCATION ZONE: ARM
LOCATION ZONE: FACE

## 2024-03-21 NOTE — PROCEDURE: SUNSCREEN RECOMMENDATIONS

## 2024-03-21 NOTE — PROCEDURE: LIQUID NITROGEN
Application Tool (Optional): Liquid Nitrogen Sprayer
Render Post-Care Instructions In Note?: no
Duration Of Freeze Thaw-Cycle (Seconds): 0
Number Of Freeze-Thaw Cycles: 2 freeze-thaw cycles
Post-Care Instructions: I reviewed with the patient in detail post-care instructions. Patient is to wear sunprotection, and avoid picking at any of the treated lesions. Pt may apply Vaseline to crusted or scabbing areas.
Detail Level: Detailed
Aperture Size (Optional): C
Show Applicator Variable?: Yes
Consent: The patient's consent was obtained including but not limited to risks of crusting, scabbing, blistering, scarring, darker or lighter pigmentary change, recurrence, incomplete removal and infection.
Application Tool (Optional): Cry-AC
Medical Necessity Information: It is in your best interest to select a reason for this procedure from the list below. All of these items fulfill various CMS LCD requirements except the new and changing color options.
Medical Necessity Clause: This procedure was medically necessary because the lesions that were treated were:
Number Of Freeze-Thaw Cycles: 3 freeze-thaw cycles
Spray Paint Text: The liquid nitrogen was applied to the skin utilizing a spray paint frosting technique.

## 2024-03-26 ENCOUNTER — OFFICE VISIT (OUTPATIENT)
Dept: FAMILY MEDICINE CLINIC | Facility: CLINIC | Age: 79
End: 2024-03-26

## 2024-03-26 VITALS
RESPIRATION RATE: 18 BRPM | OXYGEN SATURATION: 97 % | HEART RATE: 66 BPM | TEMPERATURE: 98.1 F | BODY MASS INDEX: 36 KG/M2 | SYSTOLIC BLOOD PRESSURE: 124 MMHG | WEIGHT: 224 LBS | HEIGHT: 66 IN | DIASTOLIC BLOOD PRESSURE: 78 MMHG

## 2024-03-26 DIAGNOSIS — E11.65 TYPE 2 DIABETES MELLITUS WITH HYPERGLYCEMIA, WITHOUT LONG-TERM CURRENT USE OF INSULIN (HCC): ICD-10-CM

## 2024-03-26 DIAGNOSIS — E78.1 HIGH TRIGLYCERIDES: ICD-10-CM

## 2024-03-26 DIAGNOSIS — J30.2 SEASONAL ALLERGIES: ICD-10-CM

## 2024-03-26 DIAGNOSIS — Z00.00 ENCOUNTER FOR ANNUAL WELLNESS EXAM IN MEDICARE PATIENT: ICD-10-CM

## 2024-03-26 DIAGNOSIS — Z00.00 MEDICARE ANNUAL WELLNESS VISIT, SUBSEQUENT: Primary | ICD-10-CM

## 2024-03-26 LAB
CHOLEST SERPL-MCNC: 118 MG/DL
HDLC SERPL-MCNC: 44 MG/DL (ref 40–60)
HDLC SERPL: 2.7
LDLC SERPL CALC-MCNC: 29.8 MG/DL
TRIGL SERPL-MCNC: 221 MG/DL (ref 35–150)
VLDLC SERPL CALC-MCNC: 44.2 MG/DL (ref 6–23)

## 2024-03-26 RX ORDER — FLUTICASONE PROPIONATE 50 MCG
2 SPRAY, SUSPENSION (ML) NASAL DAILY
Qty: 48 G | Refills: 1 | Status: SHIPPED | OUTPATIENT
Start: 2024-03-26

## 2024-03-26 RX ORDER — GEMFIBROZIL 600 MG/1
600 TABLET, FILM COATED ORAL
Qty: 90 TABLET | Refills: 0 | Status: SHIPPED | OUTPATIENT
Start: 2024-03-26 | End: 2024-06-24

## 2024-03-26 SDOH — ECONOMIC STABILITY: FOOD INSECURITY: WITHIN THE PAST 12 MONTHS, THE FOOD YOU BOUGHT JUST DIDN'T LAST AND YOU DIDN'T HAVE MONEY TO GET MORE.: NEVER TRUE

## 2024-03-26 SDOH — ECONOMIC STABILITY: FOOD INSECURITY: WITHIN THE PAST 12 MONTHS, YOU WORRIED THAT YOUR FOOD WOULD RUN OUT BEFORE YOU GOT MONEY TO BUY MORE.: NEVER TRUE

## 2024-03-26 SDOH — ECONOMIC STABILITY: INCOME INSECURITY: HOW HARD IS IT FOR YOU TO PAY FOR THE VERY BASICS LIKE FOOD, HOUSING, MEDICAL CARE, AND HEATING?: NOT HARD AT ALL

## 2024-03-26 ASSESSMENT — PATIENT HEALTH QUESTIONNAIRE - PHQ9
2. FEELING DOWN, DEPRESSED OR HOPELESS: NOT AT ALL
SUM OF ALL RESPONSES TO PHQ QUESTIONS 1-9: 0
1. LITTLE INTEREST OR PLEASURE IN DOING THINGS: NOT AT ALL
SUM OF ALL RESPONSES TO PHQ9 QUESTIONS 1 & 2: 0

## 2024-03-26 NOTE — PROGRESS NOTES
Abner Gabriel (:  1945) is a 78 y.o. male,Established patient, here for evaluation of the following chief complaint(s):  Medicare AWV (Subsequent AWV)       ASSESSMENT/PLAN:  1. High triglycerides  LDL  at 122, Cholesterol at 122; Triglycerides  at 254 from 2023; Patient reports that he has always had a higher triglycerides, given this we discussed switching ffrom pravastatin to gemfibrozil which patient also stated has worked well for him in the past.   Discontinue pravastatin, start gemfibrozil 600 mg daily.   The following orders have not been finalized:  -     gemfibrozil (LOPID) 600 MG tablet  2. Type 2 diabetes mellitus with hyperglycemia, without long-term current use of insulin (HCC)  Appears to be reasonably well-controlled with A1c's 6.9 ) on his current medication regimen.  -Noted to be on 20 mg of glipizide a.m., 10 mg at bedtime, and 30 mg of Actos daily, continue  -Recommended healthy diet and exercise regimen, also recommended a goal of 10% weight loss and an exercise regimen of 30 minutes of brisk walking 5 days/week.    3. Seasonal  Allergies   Continue Zyrtec 10mg Qday, prescribed Flonase     4. Encounter for annual wellness exam in Medicare patient  See note attached below    No follow-ups on file.       Subjective   SUBJECTIVE/OBJECTIVE:  HPI  Mr. Levine is a pleasant 78-year-old male who presents today for his Medicare annual wellness and also follow-up of his chronic issues of elevated triglycerides, diabetes and seasonal allergies.  Patient reports that his diabetes is pretty well-controlled on his current regimen of glipizide and Actos. He reports compliance with his medication regimen, denies any hypoglycemic episodes, side effects or any other issues.  He states that he has been taking his pravastatin as prescribed but in the past he always has had elevated triglycerides and was on gemfibrozil and is wondering if he should switch back to it.  He also

## 2024-03-26 NOTE — PATIENT INSTRUCTIONS
liability for your use of this information.      Personalized Preventive Plan for Abner Gabriel - 3/26/2024  Medicare offers a range of preventive health benefits. Some of the tests and screenings are paid in full while other may be subject to a deductible, co-insurance, and/or copay.    Some of these benefits include a comprehensive review of your medical history including lifestyle, illnesses that may run in your family, and various assessments and screenings as appropriate.    After reviewing your medical record and screening and assessments performed today your provider may have ordered immunizations, labs, imaging, and/or referrals for you.  A list of these orders (if applicable) as well as your Preventive Care list are included within your After Visit Summary for your review.    Other Preventive Recommendations:    A preventive eye exam performed by an eye specialist is recommended every 1-2 years to screen for glaucoma; cataracts, macular degeneration, and other eye disorders.  A preventive dental visit is recommended every 6 months.  Try to get at least 150 minutes of exercise per week or 10,000 steps per day on a pedometer .  Order or download the FREE \"Exercise & Physical Activity: Your Everyday Guide\" from [employer]. Call 1-788.418.2889 or search [employer] online.  You need 4327-7259 mg of calcium and 8316-9345 IU of vitamin D per day. It is possible to meet your calcium requirement with diet alone, but a vitamin D supplement is usually necessary to meet this goal.  When exposed to the sun, use a sunscreen that protects against both UVA and UVB radiation with an SPF of 30 or greater. Reapply every 2 to 3 hours or after sweating, drying off with a towel, or swimming.  Always wear a seat belt when traveling in a car. Always wear a helmet when riding a bicycle or motorcycle.

## 2024-04-17 DIAGNOSIS — E89.0 POSTSURGICAL HYPOTHYROIDISM: ICD-10-CM

## 2024-04-17 DIAGNOSIS — E55.9 VITAMIN D DEFICIENCY: ICD-10-CM

## 2024-04-17 DIAGNOSIS — Z86.39 HISTORY OF PRIMARY HYPERPARATHYROIDISM: ICD-10-CM

## 2024-04-17 DIAGNOSIS — C73 PAPILLARY THYROID CARCINOMA (HCC): ICD-10-CM

## 2024-04-17 LAB
25(OH)D3 SERPL-MCNC: 59.5 NG/ML (ref 30–100)
ANION GAP SERPL CALC-SCNC: 7 MMOL/L (ref 2–11)
BUN SERPL-MCNC: 33 MG/DL (ref 8–23)
CALCIUM SERPL-MCNC: 10.1 MG/DL (ref 8.3–10.4)
CHLORIDE SERPL-SCNC: 103 MMOL/L (ref 103–113)
CO2 SERPL-SCNC: 26 MMOL/L (ref 21–32)
CREAT SERPL-MCNC: 1.1 MG/DL (ref 0.8–1.5)
GLUCOSE SERPL-MCNC: 191 MG/DL (ref 65–100)
POTASSIUM SERPL-SCNC: 4.1 MMOL/L (ref 3.5–5.1)
SODIUM SERPL-SCNC: 136 MMOL/L (ref 136–146)
TSH W FREE THYROID IF ABNORMAL: 0.46 UIU/ML (ref 0.36–3.74)

## 2024-04-17 RX ORDER — LEVOTHYROXINE SODIUM 0.15 MG/1
TABLET ORAL
Qty: 90 TABLET | Refills: 3 | OUTPATIENT
Start: 2024-04-17

## 2024-04-17 NOTE — TELEPHONE ENCOUNTER
I called the Pt to see if he needs a refill on his levothyroxine, he was not avalible. I see that he has an appointment on 4/24/24.

## 2024-04-18 LAB
CALCIUM SERPL-MCNC: 9.8 MG/DL (ref 8.3–10.4)
PTH-INTACT SERPL-MCNC: 28.8 PG/ML (ref 18.5–88)

## 2024-04-19 LAB — THYROGLOB AB SERPL-ACNC: 1.2 IU/ML (ref 0–0.9)

## 2024-04-24 ENCOUNTER — OFFICE VISIT (OUTPATIENT)
Dept: ENDOCRINOLOGY | Age: 79
End: 2024-04-24
Payer: MEDICARE

## 2024-04-24 VITALS
OXYGEN SATURATION: 97 % | HEIGHT: 66 IN | SYSTOLIC BLOOD PRESSURE: 128 MMHG | HEART RATE: 75 BPM | WEIGHT: 221 LBS | BODY MASS INDEX: 35.52 KG/M2 | DIASTOLIC BLOOD PRESSURE: 76 MMHG

## 2024-04-24 DIAGNOSIS — E89.0 POSTSURGICAL HYPOTHYROIDISM: ICD-10-CM

## 2024-04-24 DIAGNOSIS — Z86.39 HISTORY OF PRIMARY HYPERPARATHYROIDISM: ICD-10-CM

## 2024-04-24 DIAGNOSIS — Z85.850 HISTORY OF THYROID CANCER: Primary | ICD-10-CM

## 2024-04-24 DIAGNOSIS — E55.9 VITAMIN D DEFICIENCY: ICD-10-CM

## 2024-04-24 PROCEDURE — G8417 CALC BMI ABV UP PARAM F/U: HCPCS | Performed by: INTERNAL MEDICINE

## 2024-04-24 PROCEDURE — 99214 OFFICE O/P EST MOD 30 MIN: CPT | Performed by: INTERNAL MEDICINE

## 2024-04-24 PROCEDURE — G2211 COMPLEX E/M VISIT ADD ON: HCPCS | Performed by: INTERNAL MEDICINE

## 2024-04-24 PROCEDURE — 3078F DIAST BP <80 MM HG: CPT | Performed by: INTERNAL MEDICINE

## 2024-04-24 PROCEDURE — 3074F SYST BP LT 130 MM HG: CPT | Performed by: INTERNAL MEDICINE

## 2024-04-24 PROCEDURE — 1036F TOBACCO NON-USER: CPT | Performed by: INTERNAL MEDICINE

## 2024-04-24 PROCEDURE — 1123F ACP DISCUSS/DSCN MKR DOCD: CPT | Performed by: INTERNAL MEDICINE

## 2024-04-24 PROCEDURE — G8427 DOCREV CUR MEDS BY ELIG CLIN: HCPCS | Performed by: INTERNAL MEDICINE

## 2024-04-24 RX ORDER — LEVOTHYROXINE SODIUM 0.15 MG/1
TABLET ORAL
Qty: 90 TABLET | Refills: 3 | Status: SHIPPED | OUTPATIENT
Start: 2024-04-24

## 2024-04-24 RX ORDER — ERGOCALCIFEROL 1.25 MG/1
CAPSULE ORAL
Qty: 12 CAPSULE | Refills: 3 | Status: SHIPPED | OUTPATIENT
Start: 2024-04-24

## 2024-04-24 ASSESSMENT — ENCOUNTER SYMPTOMS
DIARRHEA: 0
CONSTIPATION: 0

## 2024-04-24 NOTE — PROGRESS NOTES
Steve Solomon MD, FACE  [facility]  [address] [place] [address], [facility] [address]  [place] [address]  Phone [phone]  Facsimile [phone]          Abner Gabriel is a 78 y.o. male seen 4/24/2024 for follow-up of thyroid cancer and hypothyroidism           ASSESSMENT AND PLAN:    1. 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  There were no high risk features on final pathology and radioactive iodine remnant ablation was not recommended.  Neck ultrasound negative 4/2022.  His thyroglobulin level has been very low as expected.  I am awaiting  his most recent thyroglobulin level by [facility].  Follow-up in 1 year with a thyroglobulin level prior to visit.      2. Postsurgical hypothyroidism  Goal TSH 0.4-2.0 (although 0.1-0.4 is acceptable in the setting of a normal free T4).      - levothyroxine (SYNTHROID) 150 MCG tablet; 1 tablet once a day except for 1/2 tablet on Sundays  Dispense: 90 tablet; Refill: 3    3. History of primary hyperparathyroidism  Status post right superior parathyroidectomy 4/2019.  His most recent serum calcium level was normal 4/2024.  I will monitor him for recurrence once a year.    4. Vitamin D deficiency  Vitamin D level normal 4/2024.    - ergocalciferol (ERGOCALCIFEROL) 1.25 MG (44559 UT) capsule; Take 1 Capsule by mouth every seven (7) days.  Dispense: 12 capsule; Refill: 3      Follow-up and Dispositions    Return in about 1 year (around 4/24/2025).         HISTORY OF PRESENT ILLNESS:    THYROID CANCER     Presentation: Right thyroid nodule status post FNA biopsy revealing papillary thyroid carcinoma, status post total thyroidectomy 4/18/2019 (pathology revealed multifocal, bilateral papillary thyroid carcinoma with the tumor in the right lobe measuring 0.8 cm and the tumor in the left lobe measuring

## 2024-04-30 LAB
THYROGLOB AB SERPL-ACNC: 1.2 IU/ML (ref 0–0.9)
THYROGLOBULIN: <2 NG/ML

## 2024-05-07 ENCOUNTER — TELEPHONE (OUTPATIENT)
Dept: FAMILY MEDICINE CLINIC | Facility: CLINIC | Age: 79
End: 2024-05-07

## 2024-05-07 DIAGNOSIS — E11.65 TYPE 2 DIABETES MELLITUS WITH HYPERGLYCEMIA, WITHOUT LONG-TERM CURRENT USE OF INSULIN (HCC): Primary | ICD-10-CM

## 2024-05-07 NOTE — TELEPHONE ENCOUNTER
Pt. Called in requesting refill on Contour test strips quantity 100 for Medicare to cover to be sent to [facility]

## 2024-05-29 ENCOUNTER — TELEPHONE (OUTPATIENT)
Dept: FAMILY MEDICINE CLINIC | Facility: CLINIC | Age: 79
End: 2024-05-29

## 2024-05-29 NOTE — TELEPHONE ENCOUNTER
Pt. Called in requesting med refills on Doxazosin and Glipizide be sent to [facility], until he comes to next appointment 6/3/2024

## 2024-05-31 DIAGNOSIS — I10 ESSENTIAL (PRIMARY) HYPERTENSION: ICD-10-CM

## 2024-05-31 DIAGNOSIS — E11.65 TYPE 2 DIABETES MELLITUS WITH HYPERGLYCEMIA, WITHOUT LONG-TERM CURRENT USE OF INSULIN (HCC): ICD-10-CM

## 2024-05-31 RX ORDER — GLIPIZIDE 10 MG/1
TABLET ORAL
Qty: 270 TABLET | Refills: 4 | Status: SHIPPED | OUTPATIENT
Start: 2024-05-31

## 2024-05-31 RX ORDER — DOXAZOSIN 8 MG/1
TABLET ORAL
Qty: 90 TABLET | Refills: 1 | Status: SHIPPED | OUTPATIENT
Start: 2024-05-31

## 2024-06-10 ENCOUNTER — OFFICE VISIT (OUTPATIENT)
Age: 79
End: 2024-06-10
Payer: MEDICARE

## 2024-06-10 ENCOUNTER — NURSE ONLY (OUTPATIENT)
Age: 79
End: 2024-06-10
Payer: MEDICARE

## 2024-06-10 VITALS
SYSTOLIC BLOOD PRESSURE: 98 MMHG | DIASTOLIC BLOOD PRESSURE: 66 MMHG | WEIGHT: 221 LBS | HEART RATE: 79 BPM | HEIGHT: 66 IN | BODY MASS INDEX: 35.52 KG/M2

## 2024-06-10 DIAGNOSIS — E78.5 DYSLIPIDEMIA: ICD-10-CM

## 2024-06-10 DIAGNOSIS — I50.22 SYSTOLIC CHF, CHRONIC (HCC): Primary | ICD-10-CM

## 2024-06-10 DIAGNOSIS — I10 PRIMARY HYPERTENSION: ICD-10-CM

## 2024-06-10 DIAGNOSIS — E11.65 TYPE 2 DIABETES MELLITUS WITH HYPERGLYCEMIA, WITHOUT LONG-TERM CURRENT USE OF INSULIN (HCC): Chronic | ICD-10-CM

## 2024-06-10 DIAGNOSIS — Z95.0 BIVENTRICULAR CARDIAC PACEMAKER IN SITU: Chronic | ICD-10-CM

## 2024-06-10 DIAGNOSIS — I44.2 AV BLOCK, COMPLETE (HCC): ICD-10-CM

## 2024-06-10 PROBLEM — Z45.010 ENCOUNTER FOR PACEMAKER AT END OF BATTERY LIFE: Status: RESOLVED | Noted: 2023-07-20 | Resolved: 2024-06-10

## 2024-06-10 PROCEDURE — 1123F ACP DISCUSS/DSCN MKR DOCD: CPT | Performed by: INTERNAL MEDICINE

## 2024-06-10 PROCEDURE — 1036F TOBACCO NON-USER: CPT | Performed by: INTERNAL MEDICINE

## 2024-06-10 PROCEDURE — 99214 OFFICE O/P EST MOD 30 MIN: CPT | Performed by: INTERNAL MEDICINE

## 2024-06-10 PROCEDURE — G8417 CALC BMI ABV UP PARAM F/U: HCPCS | Performed by: INTERNAL MEDICINE

## 2024-06-10 PROCEDURE — G8427 DOCREV CUR MEDS BY ELIG CLIN: HCPCS | Performed by: INTERNAL MEDICINE

## 2024-06-10 PROCEDURE — 3044F HG A1C LEVEL LT 7.0%: CPT | Performed by: INTERNAL MEDICINE

## 2024-06-10 PROCEDURE — 3078F DIAST BP <80 MM HG: CPT | Performed by: INTERNAL MEDICINE

## 2024-06-10 PROCEDURE — 3074F SYST BP LT 130 MM HG: CPT | Performed by: INTERNAL MEDICINE

## 2024-06-10 ASSESSMENT — ENCOUNTER SYMPTOMS
COUGH: 0
ABDOMINAL PAIN: 0
SHORTNESS OF BREATH: 0
BACK PAIN: 0

## 2024-06-10 NOTE — PROGRESS NOTES
[facility] CARDIOLOGY  [address] [place] [address], [address]  [place] [address]      06/10/24      NAME:  Abner Gabriel  : 1945  MRN: [id number]      SUBJECTIVE:   Abner Gabriel is a 78 y.o. male seen for a follow up visit regarding the following:     Chief Complaint   Patient presents with    6 Month Follow-Up    Hypertension    Congestive Heart Failure       HPI:    78 y.o. male with history of dyspnea and chest pain and normal echo and stress testing in .  He developed CHB and symptomatic bradycardia s/p PM.  His echo showed severe reduction in EF that occurred in the face of chronic RV pacing.  He had normal coronaries and EF normalized with BiV pacing.  He is NYHA class II CHF.  Echo 2024 was again normal with EF 55-60%.  Patient has struggled with chronic dyspnea.         Past Medical History, Past Surgical History, Family history, Social History, and Medications were all reviewed with the patient today and updated as necessary.     Current Outpatient Medications   Medication Sig Dispense Refill    doxazosin (CARDURA) 8 MG tablet TAKE 1 TABLET EVERY NIGHT FOR HIGH BLOOD PRESSURE AND ENLARGED PROSTATE WITH URINATION PROBLEM 90 tablet 1    glipiZIDE (GLUCOTROL) 10 MG tablet TAKE 2 TABLETS IN THE MORNING AND 1 TABLET AT BEDTIME 270 tablet 4    blood glucose test strips (CONTOUR NEXT TEST) strip Testing BS one time a day as directed, E11.9 100 each 2    levothyroxine (SYNTHROID) 150 MCG tablet 1 tablet once a day except for 1/2 tablet on Sundays 90 tablet 3    ergocalciferol (ERGOCALCIFEROL) 1.25 MG (87081 UT) capsule Take 1 Capsule by mouth every seven (7) days. 12 capsule 3    gemfibrozil (LOPID) 600 MG tablet Take 1 tablet by mouth daily (with breakfast) 90 tablet 0    fluticasone (FLONASE) 50 MCG/ACT nasal spray 2 sprays by Each Nostril route daily 48 g 1    pioglitazone (ACTOS) 30 MG tablet Take 1 tablet by mouth daily 90 tablet 3    metoprolol succinate (TOPROL XL) 25 MG extended

## 2024-06-12 PROCEDURE — 93281 PM DEVICE PROGR EVAL MULTI: CPT | Performed by: INTERNAL MEDICINE

## 2024-06-23 DIAGNOSIS — E78.1 HIGH TRIGLYCERIDES: ICD-10-CM

## 2024-06-24 RX ORDER — GEMFIBROZIL 600 MG/1
600 TABLET, FILM COATED ORAL
Qty: 90 TABLET | Refills: 0 | OUTPATIENT
Start: 2024-06-24

## 2024-06-26 ENCOUNTER — OFFICE VISIT (OUTPATIENT)
Dept: FAMILY MEDICINE CLINIC | Facility: CLINIC | Age: 79
End: 2024-06-26
Payer: MEDICARE

## 2024-06-26 VITALS
OXYGEN SATURATION: 99 % | DIASTOLIC BLOOD PRESSURE: 70 MMHG | BODY MASS INDEX: 35.41 KG/M2 | SYSTOLIC BLOOD PRESSURE: 110 MMHG | WEIGHT: 220.3 LBS | HEART RATE: 58 BPM | RESPIRATION RATE: 16 BRPM | TEMPERATURE: 97.1 F | HEIGHT: 66 IN

## 2024-06-26 DIAGNOSIS — I50.22 SYSTOLIC CHF, CHRONIC (HCC): ICD-10-CM

## 2024-06-26 DIAGNOSIS — E78.1 HIGH TRIGLYCERIDES: ICD-10-CM

## 2024-06-26 DIAGNOSIS — Z95.0 BIVENTRICULAR CARDIAC PACEMAKER IN SITU: Chronic | ICD-10-CM

## 2024-06-26 DIAGNOSIS — E11.65 TYPE 2 DIABETES MELLITUS WITH HYPERGLYCEMIA, WITHOUT LONG-TERM CURRENT USE OF INSULIN (HCC): ICD-10-CM

## 2024-06-26 DIAGNOSIS — M79.641 PAIN IN BOTH HANDS: ICD-10-CM

## 2024-06-26 DIAGNOSIS — I10 ESSENTIAL HYPERTENSION: Primary | ICD-10-CM

## 2024-06-26 DIAGNOSIS — J30.2 SEASONAL ALLERGIES: ICD-10-CM

## 2024-06-26 DIAGNOSIS — M79.642 PAIN IN BOTH HANDS: ICD-10-CM

## 2024-06-26 LAB
CHOLEST SERPL-MCNC: 143 MG/DL (ref 0–200)
EST. AVERAGE GLUCOSE BLD GHB EST-MCNC: 148 MG/DL
HBA1C MFR BLD: 6.8 % (ref 0–5.6)
HDLC SERPL-MCNC: 45 MG/DL (ref 40–60)
HDLC SERPL: 3.2 (ref 0–5)
LDLC SERPL CALC-MCNC: 70 MG/DL (ref 0–100)
TRIGL SERPL-MCNC: 140 MG/DL (ref 0–150)
VLDLC SERPL CALC-MCNC: 28 MG/DL (ref 6–23)

## 2024-06-26 PROCEDURE — G8417 CALC BMI ABV UP PARAM F/U: HCPCS | Performed by: FAMILY MEDICINE

## 2024-06-26 PROCEDURE — 3078F DIAST BP <80 MM HG: CPT | Performed by: FAMILY MEDICINE

## 2024-06-26 PROCEDURE — 3044F HG A1C LEVEL LT 7.0%: CPT | Performed by: FAMILY MEDICINE

## 2024-06-26 PROCEDURE — 1036F TOBACCO NON-USER: CPT | Performed by: FAMILY MEDICINE

## 2024-06-26 PROCEDURE — 1123F ACP DISCUSS/DSCN MKR DOCD: CPT | Performed by: FAMILY MEDICINE

## 2024-06-26 PROCEDURE — 3074F SYST BP LT 130 MM HG: CPT | Performed by: FAMILY MEDICINE

## 2024-06-26 PROCEDURE — 99214 OFFICE O/P EST MOD 30 MIN: CPT | Performed by: FAMILY MEDICINE

## 2024-06-26 PROCEDURE — G8427 DOCREV CUR MEDS BY ELIG CLIN: HCPCS | Performed by: FAMILY MEDICINE

## 2024-06-26 RX ORDER — GEMFIBROZIL 600 MG/1
600 TABLET, FILM COATED ORAL
Qty: 90 TABLET | Refills: 1 | Status: SHIPPED | OUTPATIENT
Start: 2024-06-26 | End: 2024-12-23

## 2024-06-26 ASSESSMENT — PATIENT HEALTH QUESTIONNAIRE - PHQ9
2. FEELING DOWN, DEPRESSED OR HOPELESS: NOT AT ALL
1. LITTLE INTEREST OR PLEASURE IN DOING THINGS: NOT AT ALL
SUM OF ALL RESPONSES TO PHQ9 QUESTIONS 1 & 2: 0
SUM OF ALL RESPONSES TO PHQ QUESTIONS 1-9: 0

## 2024-06-26 NOTE — PROGRESS NOTES
Abner Gabriel (:  1945) is a 78 y.o. male,Established patient, here for evaluation of the following chief complaint(s):  Follow-up (3 month follow up)      Assessment & Plan   1. Essential hypertension  Blood pressures at 118/80.  At goal on his current medication regimen of 25 mg of metoprolol, 5 mg of lisinopril, continue   -Recommended continuing to maintain a low-salt/DASH diet and a healthy exercise regimen  2. High triglycerides  LDL from 2023 at 21.2, cholesterol at 23  Controlled on his current medication regimen of pravastatin 20, continue  -     gemfibrozil (LOPID) 600 MG tablet; Take 1 tablet by mouth daily (with breakfast), Disp-90 tablet, R-1Normal  -     Lipid Panel; Future  3. Type 2 diabetes mellitus with hyperglycemia, without long-term current use of insulin (HCC)  Appears to be reasonably well-controlled with A1c's approximately 7 on his current medication regimen.  -Noted to be on 20 mg of glipizide a.m., 10 mg at bedtime, and 30 mg of Actos daily  -Recommended healthy diet regimen as noted in the obesity section, also recommended a goal of 10% weight loss and an exercise regimen of 30 minutes of brisk walking 5 days/week.  -     Hemoglobin A1C; Future    4. Seasonal allergies  Controlled on Zyrtec, continue     5. Biventricular cardiac pacemaker in situ; 6. Systolic CHF, chronic (HCC)  Had a recent follow-up with cardiology (06/10/24); on statin, beta-blocker, ACE and a biventricular pacemaker with normal function.  Modulation of medical management per cardiology recommendations.  7. Pain in both hands  Appears to have pain in his joints in bilateral upper extremities, additionally also has a trigger finger on both hands for which he reports that the steroid injections have worked well for him in the past.  Given the limitations of time recommended a follow-up and offered trigger finger injections. Patient is agreeable   -     diclofenac sodium (VOLTAREN) 1 % GEL;

## 2024-07-03 ENCOUNTER — OFFICE VISIT (OUTPATIENT)
Dept: FAMILY MEDICINE CLINIC | Facility: CLINIC | Age: 79
End: 2024-07-03
Payer: MEDICARE

## 2024-07-03 VITALS
DIASTOLIC BLOOD PRESSURE: 80 MMHG | RESPIRATION RATE: 18 BRPM | WEIGHT: 219.4 LBS | BODY MASS INDEX: 35.26 KG/M2 | HEIGHT: 66 IN | SYSTOLIC BLOOD PRESSURE: 130 MMHG | TEMPERATURE: 97.3 F | OXYGEN SATURATION: 99 % | HEART RATE: 98 BPM

## 2024-07-03 DIAGNOSIS — M65.342 TRIGGER RING FINGER OF LEFT HAND: ICD-10-CM

## 2024-07-03 DIAGNOSIS — M65.331 TRIGGER MIDDLE FINGER OF RIGHT HAND: Primary | ICD-10-CM

## 2024-07-03 PROCEDURE — 20551 NJX 1 TENDON ORIGIN/INSJ: CPT | Performed by: FAMILY MEDICINE

## 2024-07-03 RX ORDER — TRIAMCINOLONE ACETONIDE 40 MG/ML
40 INJECTION, SUSPENSION INTRA-ARTICULAR; INTRAMUSCULAR ONCE
Status: COMPLETED | OUTPATIENT
Start: 2024-07-03 | End: 2024-07-03

## 2024-07-03 RX ADMIN — TRIAMCINOLONE ACETONIDE 40 MG: 40 INJECTION, SUSPENSION INTRA-ARTICULAR; INTRAMUSCULAR at 16:46

## 2024-07-03 ASSESSMENT — PATIENT HEALTH QUESTIONNAIRE - PHQ9
SUM OF ALL RESPONSES TO PHQ QUESTIONS 1-9: 0
2. FEELING DOWN, DEPRESSED OR HOPELESS: NOT AT ALL
SUM OF ALL RESPONSES TO PHQ QUESTIONS 1-9: 0
1. LITTLE INTEREST OR PLEASURE IN DOING THINGS: NOT AT ALL
SUM OF ALL RESPONSES TO PHQ9 QUESTIONS 1 & 2: 0
SUM OF ALL RESPONSES TO PHQ QUESTIONS 1-9: 0
SUM OF ALL RESPONSES TO PHQ QUESTIONS 1-9: 0

## 2024-07-03 NOTE — PROGRESS NOTES
Abner Gabriel (:  1945) is a 78 y.o. male,Established patient, here for evaluation of the following chief complaint(s):  Other (Injections )    Assessment & Plan   1. Trigger middle finger of right hand  -     triamcinolone acetonide (KENALOG-40) injection 40 mg; 40 mg, Intra-artICUlar, ONCE, 1 dose, On Wed 7/3/24 at 1715  2. Trigger ring finger of left hand  -     triamcinolone acetonide (KENALOG-40) injection 40 mg; 40 mg, Intra-artICUlar, ONCE, 1 dose, On Wed 7/3/24 at 1715    1ml of 40mg/ml Kenalog and 1ml of Lidocaine was drawn into a 5-mL syringe with a 27-gauge 1\" needle. Using the intercrease line as a landmark, the 0.5-mL solution was injected at a steady rate outside the tendinous sheath at the level of the A1 pulley (intrathecal or intratendinous injections were avoided). A small dressing was applied, and no postprocedural analgesic medications were prescribed or advised.     No follow-ups on file.       Subjective   HPI  Abner is a pleasant 78-year-old male who presents today for a follow-up of his chronic issues of trigger fingers.  Patient was evaluated on his last visit and wanted to get steroid injections on the trigger fingers of his hands as this has worked really well for him in the past with no side effects.  He denies any other acute complaints or concerns today  Review of Systems   Denies any HA, fevers, chills, N/V,D, chest pain or palpitations, abdominal pain, hematuria, dysuria and hematochezia    Objective   Physical Exam   General: Well appearing, well nourished, NAD.   Heart: RRR, No cardiomegaly,murmurs or gallop  Lungs:CTAB, no wheezes or rhonchi  Skin: Good turgor, no rash, unusual bruising or prominent lesions  Extremities: No cyanosis or edema,  peripheral pulses intact, Capillary refill <3 sec. trigger finger noted on left ring finger and right middle finger.   Musculoskeletal: ROM intact. No swelling or erythema.  Neurologic: No focal deficits.

## 2024-09-24 DIAGNOSIS — J30.2 SEASONAL ALLERGIES: ICD-10-CM

## 2024-09-24 RX ORDER — FLUTICASONE PROPIONATE 50 MCG
2 SPRAY, SUSPENSION (ML) NASAL DAILY
Refills: 1 | OUTPATIENT
Start: 2024-09-24

## 2024-10-02 ENCOUNTER — TELEPHONE (OUTPATIENT)
Age: 79
End: 2024-10-02

## 2024-10-02 ENCOUNTER — TELEPHONE (OUTPATIENT)
Dept: FAMILY MEDICINE CLINIC | Facility: CLINIC | Age: 79
End: 2024-10-02

## 2024-10-02 DIAGNOSIS — I10 ESSENTIAL (PRIMARY) HYPERTENSION: ICD-10-CM

## 2024-10-02 RX ORDER — LISINOPRIL 5 MG/1
TABLET ORAL
Qty: 30 TABLET | Refills: 0 | Status: SHIPPED | OUTPATIENT
Start: 2024-10-02

## 2024-10-02 NOTE — TELEPHONE ENCOUNTER
Patient has an appointment for tomorrow but does not know if he will make it does not have any power, but would like a refill on Lisinopril be sent to the [facility] in [place].

## 2024-10-02 NOTE — TELEPHONE ENCOUNTER
Called patient and he states when he was in office to see Dr. TURCIOS in June and spoke to Dr. TURCIOS about helping him with his medicare coinsurance by writing a letter of his diagnosis of heart failure. Patient assured me that Dr. TURCIOS knows about this situation.     Letter needs to be sent to:    [employer]  Phone number [phone]  Attn: Yon Romero          Patient Active Problem List     Diagnosis Date Noted    Type 2 diabetes mellitus with hyperglycemia, without long-term current use of insulin ([facility]) 11/16/2022       Priority: High    Postprocedural hypoparathyroidism ([facility]) 01/17/2023       Priority: Medium    Trigger finger, right middle finger 10/11/2023       Priority: Low    Arthritis of carpometacarpal (CMC) joint of right thumb 10/11/2023       Priority: Low    Vitamin D deficiency 07/31/2019       Priority: Low    Postsurgical hypothyroidism 07/31/2019       Priority: Low    Osteopenia 07/31/2019       Priority: Low    History of papillary thyroid carcinoma 03/07/2019       Priority: Low    History of primary hyperparathyroidism 01/23/2019       Priority: Low    Rosacea 04/17/2018       Priority: Low    Gastroesophageal reflux disease without esophagitis 04/12/2018       Priority: Low    Nasal congestion 03/02/2018       Priority: Low    Systolic CHF, chronic ([facility]) 02/15/2017       Priority: Low       10/2014:  EF 65%  02/2017:  EF 30-35%  03/2017:  EF 30-35% - Normal coronaries  09/2017:  EF 50%  10/2018:  EF 55%  11/2020:  EF 50-55%  05/2022:  EF 50-55%  05/2023:  EF 55-60%  06/2024:  EF 55-60%          Biventricular cardiac pacemaker in situ 01/18/2016       Priority: Low       normal function          Dyslipidemia 09/16/2015       Priority: Low    Type II diabetes mellitus (HCC) 09/16/2015       Priority: Low    Hypertension 09/16/2015       Priority: Low    Malignant neoplasm of prostate (HCC) 05/15/2015       Priority: Low    Impotence of organic origin 03/14/2014       Priority: Low

## 2024-10-02 NOTE — TELEPHONE ENCOUNTER
To whom it may concern:    This letter is to document that Mr. Velarde has had complete recovery of his underlying heart failure with reduced ejection fraction.  It was related to chronic RV pacing and now is status post biventricular pacing device.  His most recent LVEF demonstrates ejection fraction 55-60%.  Patient no longer suffers from heart failure with reduced ejection fraction.  This letter is to support his application for health coinsurance.  If there are any additional questions please feel free to contact me    Sincerely,    KATHRYN SWANSON MD

## 2024-10-03 ENCOUNTER — OFFICE VISIT (OUTPATIENT)
Dept: FAMILY MEDICINE CLINIC | Facility: CLINIC | Age: 79
End: 2024-10-03

## 2024-10-03 VITALS
HEART RATE: 71 BPM | DIASTOLIC BLOOD PRESSURE: 80 MMHG | WEIGHT: 219.3 LBS | BODY MASS INDEX: 35.24 KG/M2 | HEIGHT: 66 IN | TEMPERATURE: 97.9 F | OXYGEN SATURATION: 99 % | RESPIRATION RATE: 16 BRPM | SYSTOLIC BLOOD PRESSURE: 130 MMHG

## 2024-10-03 DIAGNOSIS — E78.1 HIGH TRIGLYCERIDES: ICD-10-CM

## 2024-10-03 DIAGNOSIS — I10 ESSENTIAL (PRIMARY) HYPERTENSION: ICD-10-CM

## 2024-10-03 DIAGNOSIS — Z95.0 BIVENTRICULAR CARDIAC PACEMAKER IN SITU: ICD-10-CM

## 2024-10-03 DIAGNOSIS — E11.65 TYPE 2 DIABETES MELLITUS WITH HYPERGLYCEMIA, WITHOUT LONG-TERM CURRENT USE OF INSULIN (HCC): Primary | ICD-10-CM

## 2024-10-03 DIAGNOSIS — I50.22 SYSTOLIC CHF, CHRONIC (HCC): ICD-10-CM

## 2024-10-03 DIAGNOSIS — E89.2 POSTPROCEDURAL HYPOPARATHYROIDISM (HCC): ICD-10-CM

## 2024-10-03 RX ORDER — DOXAZOSIN 8 MG/1
TABLET ORAL
Qty: 90 TABLET | Refills: 1 | Status: SHIPPED | OUTPATIENT
Start: 2024-10-03

## 2024-10-03 RX ORDER — GEMFIBROZIL 600 MG/1
600 TABLET, FILM COATED ORAL
Qty: 90 TABLET | Refills: 1 | Status: SHIPPED | OUTPATIENT
Start: 2024-10-03 | End: 2025-04-01

## 2024-10-03 ASSESSMENT — PATIENT HEALTH QUESTIONNAIRE - PHQ9
SUM OF ALL RESPONSES TO PHQ QUESTIONS 1-9: 0
SUM OF ALL RESPONSES TO PHQ QUESTIONS 1-9: 0
1. LITTLE INTEREST OR PLEASURE IN DOING THINGS: NOT AT ALL
2. FEELING DOWN, DEPRESSED OR HOPELESS: NOT AT ALL
SUM OF ALL RESPONSES TO PHQ QUESTIONS 1-9: 0
SUM OF ALL RESPONSES TO PHQ QUESTIONS 1-9: 0
SUM OF ALL RESPONSES TO PHQ9 QUESTIONS 1 & 2: 0

## 2024-10-03 NOTE — PROGRESS NOTES
Abner Gabriel (:  1945) is a 79 y.o. male,Established patient, here for evaluation of the following chief complaint(s):  Follow-up    Assessment & Plan  High triglycerides   Chronic, at goal (stable), continue current treatment plan  Lab Results   Component Value Date    CHOL 143 2024    TRIG 140 2024    HDL 45 2024    LDL 70 2024    VLDL 28 (H) 2024    CHOLHDLRATIO 3.2 2024       Orders:    gemfibrozil (LOPID) 600 MG tablet; Take 1 tablet by mouth daily (with breakfast)    Essential (primary) hypertension   Chronic, at goal (stable), continue current treatment plan    Orders:    doxazosin (CARDURA) 8 MG tablet; TAKE 1 TABLET EVERY NIGHT FOR HIGH BLOOD PRESSURE AND ENLARGED PROSTATE WITH URINATION PROBLEM    Type 2 diabetes mellitus with hyperglycemia, without long-term current use of insulin (HCC)   Chronic, at goal (stable), continue current treatment plan      Postprocedural hypoparathyroidism (HCC)   Monitored by specialist- no acute findings meriting change in the plan      Biventricular cardiac pacemaker in situ   Monitored by specialist- no acute findings meriting change in the plan      Systolic CHF, chronic (HCC)   Monitored by specialist- no acute findings meriting change in the plan        Return in about 3 months (around 1/3/2025).       Subjective   HPI  Abner Gabriel is a pleasant 79-year-old male who presents today for follow-up of his chronic medical issues .    Patient reports that he continues to follow with cardiology and endocrinology and recently had an appointment with them.      Reports that he does not check his blood pressures at home but they likely normal.  He denies any additional acute complaints or concerns today.      Essential hypertensionW   Blood pressures at home - does not check.  At goal on his current medication regimen of 25 mg of metoprolol, 5 mg of lisinopril, 8 mg Doxazosin     Type 2 diabetes mellitus without

## 2024-10-03 NOTE — TELEPHONE ENCOUNTER
Called Yon Romero and he asked that I fax letter to [phone].    Patient asked that I mail a copy to his home.

## 2024-10-25 DIAGNOSIS — I10 ESSENTIAL (PRIMARY) HYPERTENSION: ICD-10-CM

## 2024-10-25 RX ORDER — LISINOPRIL 5 MG/1
TABLET ORAL
Qty: 90 TABLET | Refills: 1 | OUTPATIENT
Start: 2024-10-25

## 2024-11-04 ENCOUNTER — TELEPHONE (OUTPATIENT)
Dept: FAMILY MEDICINE CLINIC | Facility: CLINIC | Age: 79
End: 2024-11-04

## 2024-11-04 NOTE — TELEPHONE ENCOUNTER
Patient is requesting a refill on Lisinopril be sent to the [facility] in [place].

## 2024-11-05 DIAGNOSIS — I10 ESSENTIAL (PRIMARY) HYPERTENSION: ICD-10-CM

## 2024-11-05 RX ORDER — LISINOPRIL 5 MG/1
TABLET ORAL
Qty: 30 TABLET | Refills: 0 | Status: SHIPPED | OUTPATIENT
Start: 2024-11-05

## 2024-12-02 ENCOUNTER — TELEPHONE (OUTPATIENT)
Dept: FAMILY MEDICINE CLINIC | Facility: CLINIC | Age: 79
End: 2024-12-02

## 2024-12-02 NOTE — TELEPHONE ENCOUNTER
Patient needs refills on the following medications, he is completely out and wont have enough to make it to his upcoming appointment with provider next month 01/03/2025:    LISINOPRIL    GEMFIBROZIL

## 2024-12-03 DIAGNOSIS — I10 ESSENTIAL (PRIMARY) HYPERTENSION: ICD-10-CM

## 2024-12-03 DIAGNOSIS — E78.1 HIGH TRIGLYCERIDES: ICD-10-CM

## 2024-12-03 RX ORDER — LISINOPRIL 5 MG/1
TABLET ORAL
Qty: 90 TABLET | Refills: 0 | Status: SHIPPED | OUTPATIENT
Start: 2024-12-03

## 2024-12-03 RX ORDER — GEMFIBROZIL 600 MG/1
600 TABLET, FILM COATED ORAL
Qty: 90 TABLET | Refills: 1 | Status: SHIPPED | OUTPATIENT
Start: 2024-12-03 | End: 2025-06-01

## 2024-12-13 ENCOUNTER — OFFICE VISIT (OUTPATIENT)
Age: 79
End: 2024-12-13

## 2024-12-13 VITALS
BODY MASS INDEX: 35.87 KG/M2 | HEART RATE: 76 BPM | WEIGHT: 223.2 LBS | DIASTOLIC BLOOD PRESSURE: 78 MMHG | HEIGHT: 66 IN | SYSTOLIC BLOOD PRESSURE: 114 MMHG

## 2024-12-13 DIAGNOSIS — E78.5 DYSLIPIDEMIA: ICD-10-CM

## 2024-12-13 DIAGNOSIS — E11.65 TYPE 2 DIABETES MELLITUS WITH HYPERGLYCEMIA, WITHOUT LONG-TERM CURRENT USE OF INSULIN (HCC): Chronic | ICD-10-CM

## 2024-12-13 DIAGNOSIS — Z95.0 BIVENTRICULAR CARDIAC PACEMAKER IN SITU: Chronic | ICD-10-CM

## 2024-12-13 DIAGNOSIS — I10 PRIMARY HYPERTENSION: ICD-10-CM

## 2024-12-13 DIAGNOSIS — I50.22 SYSTOLIC CHF, CHRONIC (HCC): Primary | ICD-10-CM

## 2024-12-13 RX ORDER — METOPROLOL SUCCINATE 25 MG/1
25 TABLET, EXTENDED RELEASE ORAL DAILY
Qty: 90 TABLET | Refills: 3 | Status: SHIPPED | OUTPATIENT
Start: 2024-12-13

## 2024-12-13 ASSESSMENT — ENCOUNTER SYMPTOMS
BACK PAIN: 0
SHORTNESS OF BREATH: 0
COUGH: 0
ABDOMINAL PAIN: 0

## 2024-12-13 NOTE — PROGRESS NOTES
[facility] CARDIOLOGY  [address] [place] [address], [address]  [place] [address]      24      NAME:  Abner Gabriel  : 1945  MRN: [id number]      SUBJECTIVE:   Abner Gabriel is a 79 y.o. male seen for a follow up visit regarding the following:     Chief Complaint   Patient presents with    Congestive Heart Failure    Hypertension       HPI:    79 y.o. male with history of dyspnea and chest pain and normal echo and stress testing in .  He developed CHB and symptomatic bradycardia s/p PM.  His echo showed severe reduction in EF that occurred in the face of chronic RV pacing.  He had normal coronaries and EF normalized with BiV pacing.  He is NYHA class II CHF.  Echo 2024 was again normal with EF 55-60%.  Patient has struggled with chronic dyspnea.         Past Medical History, Past Surgical History, Family history, Social History, and Medications were all reviewed with the patient today and updated as necessary.     Current Outpatient Medications   Medication Sig Dispense Refill    metoprolol succinate (TOPROL XL) 25 MG extended release tablet Take 1 tablet by mouth daily 90 tablet 3    lisinopril (PRINIVIL;ZESTRIL) 5 MG tablet TAKE 1 TABLET EVERY DAY 90 tablet 0    gemfibrozil (LOPID) 600 MG tablet Take 1 tablet by mouth daily (with breakfast) 90 tablet 1    doxazosin (CARDURA) 8 MG tablet TAKE 1 TABLET EVERY NIGHT FOR HIGH BLOOD PRESSURE AND ENLARGED PROSTATE WITH URINATION PROBLEM 90 tablet 1    glipiZIDE (GLUCOTROL) 10 MG tablet TAKE 2 TABLETS IN THE MORNING AND 1 TABLET AT BEDTIME 270 tablet 4    blood glucose test strips (CONTOUR NEXT TEST) strip Testing BS one time a day as directed, E11.9 100 each 2    levothyroxine (SYNTHROID) 150 MCG tablet 1 tablet once a day except for 1/2 tablet on Sundays 90 tablet 3    ergocalciferol (ERGOCALCIFEROL) 1.25 MG (70251 UT) capsule Take 1 Capsule by mouth every seven (7) days. 12 capsule 3    fluticasone (FLONASE) 50 MCG/ACT nasal spray 2

## 2025-01-03 ENCOUNTER — OFFICE VISIT (OUTPATIENT)
Dept: FAMILY MEDICINE CLINIC | Facility: CLINIC | Age: 80
End: 2025-01-03

## 2025-01-03 VITALS
BODY MASS INDEX: 36.42 KG/M2 | HEIGHT: 66 IN | HEART RATE: 94 BPM | WEIGHT: 226.6 LBS | DIASTOLIC BLOOD PRESSURE: 80 MMHG | RESPIRATION RATE: 16 BRPM | TEMPERATURE: 97.3 F | SYSTOLIC BLOOD PRESSURE: 126 MMHG | OXYGEN SATURATION: 99 %

## 2025-01-03 DIAGNOSIS — I10 ESSENTIAL (PRIMARY) HYPERTENSION: ICD-10-CM

## 2025-01-03 DIAGNOSIS — J34.89 NASAL CONGESTION WITH RHINORRHEA: ICD-10-CM

## 2025-01-03 DIAGNOSIS — I50.22 SYSTOLIC CHF, CHRONIC (HCC): ICD-10-CM

## 2025-01-03 DIAGNOSIS — E11.65 TYPE 2 DIABETES MELLITUS WITH HYPERGLYCEMIA, WITHOUT LONG-TERM CURRENT USE OF INSULIN (HCC): ICD-10-CM

## 2025-01-03 DIAGNOSIS — R09.81 NASAL CONGESTION WITH RHINORRHEA: ICD-10-CM

## 2025-01-03 DIAGNOSIS — C61 MALIGNANT NEOPLASM OF PROSTATE (HCC): ICD-10-CM

## 2025-01-03 LAB
ALBUMIN SERPL-MCNC: 3.9 G/DL (ref 3.2–4.6)
ALBUMIN/GLOB SERPL: 1.1 (ref 1–1.9)
ALP SERPL-CCNC: 58 U/L (ref 40–129)
ALT SERPL-CCNC: <5 U/L (ref 8–55)
ANION GAP SERPL CALC-SCNC: 13 MMOL/L (ref 7–16)
AST SERPL-CCNC: 23 U/L (ref 15–37)
BILIRUB SERPL-MCNC: 0.2 MG/DL (ref 0–1.2)
BUN SERPL-MCNC: 15 MG/DL (ref 8–23)
CALCIUM SERPL-MCNC: 9.7 MG/DL (ref 8.8–10.2)
CHLORIDE SERPL-SCNC: 102 MMOL/L (ref 98–107)
CO2 SERPL-SCNC: 23 MMOL/L (ref 20–29)
CREAT SERPL-MCNC: 0.95 MG/DL (ref 0.8–1.3)
CREAT UR-MCNC: 56.3 MG/DL (ref 39–259)
EST. AVERAGE GLUCOSE BLD GHB EST-MCNC: 138 MG/DL
GLOBULIN SER CALC-MCNC: 3.4 G/DL (ref 2.3–3.5)
GLUCOSE SERPL-MCNC: 146 MG/DL (ref 70–99)
HBA1C MFR BLD: 6.4 % (ref 0–5.6)
MICROALBUMIN UR-MCNC: <1.2 MG/DL (ref 0–20)
MICROALBUMIN/CREAT UR-RTO: NORMAL MG/G (ref 0–30)
POTASSIUM SERPL-SCNC: 3.8 MMOL/L (ref 3.5–5.1)
PROT SERPL-MCNC: 7.3 G/DL (ref 6.3–8.2)
SODIUM SERPL-SCNC: 137 MMOL/L (ref 136–145)

## 2025-01-03 RX ORDER — BROMPHENIRAMINE MALEATE, PSEUDOEPHEDRINE HYDROCHLORIDE, AND DEXTROMETHORPHAN HYDROBROMIDE 2; 30; 10 MG/5ML; MG/5ML; MG/5ML
5 SYRUP ORAL 4 TIMES DAILY PRN
Qty: 100 ML | Refills: 0 | Status: SHIPPED | OUTPATIENT
Start: 2025-01-03 | End: 2025-01-08

## 2025-01-03 RX ORDER — SEMAGLUTIDE 0.68 MG/ML
0.25 INJECTION, SOLUTION SUBCUTANEOUS
Qty: 3 ML | Refills: 0 | Status: SHIPPED | OUTPATIENT
Start: 2025-01-03

## 2025-01-03 RX ORDER — LISINOPRIL 5 MG/1
TABLET ORAL
Qty: 90 TABLET | Refills: 1 | Status: SHIPPED | OUTPATIENT
Start: 2025-01-03

## 2025-01-03 ASSESSMENT — PATIENT HEALTH QUESTIONNAIRE - PHQ9
SUM OF ALL RESPONSES TO PHQ QUESTIONS 1-9: 0
SUM OF ALL RESPONSES TO PHQ QUESTIONS 1-9: 0
2. FEELING DOWN, DEPRESSED OR HOPELESS: NOT AT ALL
1. LITTLE INTEREST OR PLEASURE IN DOING THINGS: NOT AT ALL
SUM OF ALL RESPONSES TO PHQ9 QUESTIONS 1 & 2: 0
SUM OF ALL RESPONSES TO PHQ QUESTIONS 1-9: 0
SUM OF ALL RESPONSES TO PHQ QUESTIONS 1-9: 0

## 2025-01-03 NOTE — ASSESSMENT & PLAN NOTE
Chronic, at goal (stable), changes made today: Discussed with him about stopping Actos, he wants to continue this for now as the other alternatives including Jardiance/Ozempic are too expensive for him    Orders:    Hemoglobin A1C; Future    Comprehensive Metabolic Panel; Future    Albumin/Creatinine Ratio, Urine; Future    Semaglutide,0.25 or 0.5MG/DOS, (OZEMPIC, 0.25 OR 0.5 MG/DOSE,) 2 MG/3ML SOPN; Inject 0.25 mg into the skin every 7 days    blood glucose test strips (CONTOUR NEXT TEST) strip; Testing BS one time a day as directed, E11.9

## 2025-01-03 NOTE — ASSESSMENT & PLAN NOTE
Monitored by specialist- no acute findings meriting change in the plan  Chronic systolic congestive heart failure with recovered ejection fraction - EF was 30-35% and normalized after BiV PM.   He had normal coronaries and EF normalized with BiV pacing. He is NYHA class II CHF. Echo 06/2024 was again normal with EF 55-60%

## 2025-01-03 NOTE — PROGRESS NOTES
Abner Gabriel (:  1945) is a 79 y.o. male,Established patient, here for evaluation of the following chief complaint(s):  Follow-up    Assessment & Plan  Essential (primary) hypertension   Chronic, at goal (stable), continue current treatment plan    Orders:    lisinopril (PRINIVIL;ZESTRIL) 5 MG tablet; TAKE 1 TABLET EVERY DAY    Systolic CHF, chronic (HCC)   Monitored by specialist- no acute findings meriting change in the plan  Chronic systolic congestive heart failure with recovered ejection fraction - EF was 30-35% and normalized after BiV PM.   He had normal coronaries and EF normalized with BiV pacing. He is NYHA class II CHF. Echo 2024 was again normal with EF 55-60%     Malignant neoplasm of prostate (HCC)   Monitored by specialist- no acute findings meriting change in the plan    Type 2 diabetes mellitus with hyperglycemia, without long-term current use of insulin (HCC)   Chronic, at goal (stable), changes made today: Discussed with him about stopping Actos, he wants to continue this for now as the other alternatives including Jardiance/Ozempic are too expensive for him    Orders:    Hemoglobin A1C; Future    Comprehensive Metabolic Panel; Future    Albumin/Creatinine Ratio, Urine; Future    Semaglutide,0.25 or 0.5MG/DOS, (OZEMPIC, 0.25 OR 0.5 MG/DOSE,) 2 MG/3ML SOPN; Inject 0.25 mg into the skin every 7 days    blood glucose test strips (CONTOUR NEXT TEST) strip; Testing BS one time a day as directed, E11.9    Body mass index [BMI] 36.0-36.9, adult (Z68.36)   Chronic, not at goal (unstable), Diet and lifestyle modifications recommended    Nasal congestion with rhinorrhea   New, not at goal (unstable), Bromfed prescribed    Orders:    brompheniramine-pseudoephedrine-DM 2-30-10 MG/5ML syrup; Take 5 mLs by mouth 4 times daily as needed for Congestion      Return in about 3 months (around 4/3/2025).       Subjective   HPI  Abner Gabriel is a pleasant 79-year-old male who presents

## 2025-03-02 DIAGNOSIS — J30.2 SEASONAL ALLERGIES: ICD-10-CM

## 2025-03-03 RX ORDER — FLUTICASONE PROPIONATE 50 MCG
2 SPRAY, SUSPENSION (ML) NASAL DAILY
Refills: 1 | OUTPATIENT
Start: 2025-03-03

## 2025-03-27 ENCOUNTER — OFFICE VISIT (OUTPATIENT)
Dept: FAMILY MEDICINE CLINIC | Facility: CLINIC | Age: 80
End: 2025-03-27

## 2025-03-27 VITALS
HEART RATE: 80 BPM | RESPIRATION RATE: 17 BRPM | SYSTOLIC BLOOD PRESSURE: 120 MMHG | OXYGEN SATURATION: 99 % | DIASTOLIC BLOOD PRESSURE: 80 MMHG | BODY MASS INDEX: 36.32 KG/M2 | TEMPERATURE: 97.6 F | WEIGHT: 226 LBS | HEIGHT: 66 IN

## 2025-03-27 DIAGNOSIS — Z00.00 MEDICARE ANNUAL WELLNESS VISIT, SUBSEQUENT: Primary | ICD-10-CM

## 2025-03-27 DIAGNOSIS — E11.65 TYPE 2 DIABETES MELLITUS WITH HYPERGLYCEMIA, WITHOUT LONG-TERM CURRENT USE OF INSULIN (HCC): ICD-10-CM

## 2025-03-27 DIAGNOSIS — Z95.0 BIVENTRICULAR CARDIAC PACEMAKER IN SITU: Chronic | ICD-10-CM

## 2025-03-27 DIAGNOSIS — E89.0 POSTSURGICAL HYPOTHYROIDISM: ICD-10-CM

## 2025-03-27 DIAGNOSIS — I10 ESSENTIAL (PRIMARY) HYPERTENSION: ICD-10-CM

## 2025-03-27 RX ORDER — GLIPIZIDE 10 MG/1
TABLET ORAL
Qty: 270 TABLET | Refills: 4 | Status: SHIPPED | OUTPATIENT
Start: 2025-03-27

## 2025-03-27 RX ORDER — PIOGLITAZONE 15 MG/1
15 TABLET ORAL DAILY
Qty: 90 TABLET | Refills: 3 | Status: SHIPPED | OUTPATIENT
Start: 2025-03-27 | End: 2026-03-22

## 2025-03-27 ASSESSMENT — PATIENT HEALTH QUESTIONNAIRE - PHQ9
SUM OF ALL RESPONSES TO PHQ QUESTIONS 1-9: 0
SUM OF ALL RESPONSES TO PHQ QUESTIONS 1-9: 0
2. FEELING DOWN, DEPRESSED OR HOPELESS: NOT AT ALL
SUM OF ALL RESPONSES TO PHQ QUESTIONS 1-9: 0
1. LITTLE INTEREST OR PLEASURE IN DOING THINGS: NOT AT ALL
SUM OF ALL RESPONSES TO PHQ QUESTIONS 1-9: 0

## 2025-03-27 ASSESSMENT — LIFESTYLE VARIABLES
HOW OFTEN DO YOU HAVE A DRINK CONTAINING ALCOHOL: NEVER
HOW MANY STANDARD DRINKS CONTAINING ALCOHOL DO YOU HAVE ON A TYPICAL DAY: PATIENT DOES NOT DRINK

## 2025-03-27 NOTE — PATIENT INSTRUCTIONS
Learning About Being Active as an Older Adult  Why is being active important as you get older?     Being active is one of the best things you can do for your health. And it's never too late to start. Being active--or getting active, if you aren't already--has definite benefits. It can:  Give you more energy,  Keep your mind sharp.  Improve balance to reduce your risk of falls.  Help you manage chronic illness with fewer medicines.  No matter how old you are, how fit you are, or what health problems you have, there is a form of activity that will work for you. And the more physical activity you can do, the better your overall health will be.  What kinds of activity can help you stay healthy?  Being more active will make your daily activities easier. Physical activity includes planned exercise and things you do in daily life. There are four types of activity:  Aerobic.  Doing aerobic activity makes your heart and lungs strong.  Includes walking, dancing, and gardening.  Aim for at least 2½ hours spread throughout the week.  It improves your energy and can help you sleep better.  Muscle-strengthening.  This type of activity can help maintain muscle and strengthen bones.  Includes climbing stairs, using resistance bands, and lifting or carrying heavy loads.  Aim for at least twice a week.  It can help protect the knees and other joints.  Stretching.  Stretching gives you better range of motion in joints and muscles.  Includes upper arm stretches, calf stretches, and gentle yoga.  Aim for at least twice a week, preferably after your muscles are warmed up from other activities.  It can help you function better in daily life.  Balancing.  This helps you stay coordinated and have good posture.  Includes heel-to-toe walking, isreal chi, and certain types of yoga.  Aim for at least 3 days a week.  It can reduce your risk of falling.  Even if you have a hard time meeting the recommendations, it's better to be more active

## 2025-03-27 NOTE — ASSESSMENT & PLAN NOTE
Chronic, at goal (stable), continue current treatment plan  Reduced Pioglitazone to 15 mg dose  Hemoglobin A1C   Date Value Ref Range Status   01/03/2025 6.4 (H) 0 - 5.6 % Final     Comment:     Reference Range  Normal       <5.7%  Prediabetes  5.7-6.4%  Diabetes     >6.4%         Orders:    glipiZIDE (GLUCOTROL) 10 MG tablet; TAKE 2 TABLETS IN THE MORNING AND 1 TABLET AT BEDTIME

## 2025-03-27 NOTE — PROGRESS NOTES
Abner Gabriel (:  1945) is a 79 y.o. male,Established patient, here for evaluation of the following chief complaint(s):  Medicare AWV         Assessment & Plan  Medicare annual wellness visit, subsequent   See Attached note below    Type 2 diabetes mellitus with hyperglycemia, without long-term current use of insulin (HCC)   Chronic, at goal (stable), continue current treatment plan  Reduced Pioglitazone to 15 mg dose  Hemoglobin A1C   Date Value Ref Range Status   2025 6.4 (H) 0 - 5.6 % Final     Comment:     Reference Range  Normal       <5.7%  Prediabetes  5.7-6.4%  Diabetes     >6.4%         Orders:    glipiZIDE (GLUCOTROL) 10 MG tablet; TAKE 2 TABLETS IN THE MORNING AND 1 TABLET AT BEDTIME    Essential (primary) hypertension   Chronic, at goal (stable), continue current treatment plan    Body mass index [BMI] 36.0-36.9, adult (Z68.36)   Chronic, not at goal (unstable), Calorie restriction and lifestyle modifications recommended    Biventricular cardiac pacemaker in situ   Monitored by specialist- no acute findings meriting change in the plan    Postsurgical hypothyroidism   Monitored by specialist- no acute findings meriting change in the plan  Lab Results   Component Value Date    TSH 0.794 2022    TSHELE 0.46 2024         Return in 1 year (on 3/27/2026) for Medicare AWV.       Subjective   HPI  Abner Gabriel is a pleasant 79-year-old male who presents today for follow-up of his chronic medical issues and AWV.     Patient reports that he continues to follow with cardiology  and endocrinology.        Reports that he does not check his blood pressures at home but they likely normal.  He denies any additional acute complaints or concerns today.      Essential hypertension   Blood pressures at home - does not check.  At goal on his current medication regimen of 25 mg of metoprolol, 5 mg of lisinopril, 8 mg Doxazosin     Type 2 diabetes mellitus without complication,

## 2025-03-28 NOTE — ASSESSMENT & PLAN NOTE
Monitored by specialist- no acute findings meriting change in the plan  Lab Results   Component Value Date    TSH 0.794 04/14/2022    TSHELE 0.46 04/17/2024

## 2025-04-18 DIAGNOSIS — E89.0 POSTSURGICAL HYPOTHYROIDISM: ICD-10-CM

## 2025-04-18 DIAGNOSIS — Z85.850 HISTORY OF THYROID CANCER: ICD-10-CM

## 2025-04-18 DIAGNOSIS — Z86.39 HISTORY OF PRIMARY HYPERPARATHYROIDISM: ICD-10-CM

## 2025-04-18 DIAGNOSIS — E55.9 VITAMIN D DEFICIENCY: ICD-10-CM

## 2025-04-18 LAB
25(OH)D3 SERPL-MCNC: 29.6 NG/ML (ref 30–100)
ANION GAP SERPL CALC-SCNC: 13 MMOL/L (ref 7–16)
BUN SERPL-MCNC: 17 MG/DL (ref 8–23)
CALCIUM SERPL-MCNC: 9.1 MG/DL (ref 8.8–10.2)
CALCIUM SERPL-MCNC: 9.2 MG/DL (ref 8.8–10.2)
CHLORIDE SERPL-SCNC: 101 MMOL/L (ref 98–107)
CO2 SERPL-SCNC: 25 MMOL/L (ref 20–29)
CREAT SERPL-MCNC: 0.88 MG/DL (ref 0.8–1.3)
GLUCOSE SERPL-MCNC: 177 MG/DL (ref 70–99)
POTASSIUM SERPL-SCNC: 4 MMOL/L (ref 3.5–5.1)
PTH-INTACT SERPL-MCNC: 40.3 PG/ML (ref 15–65)
SODIUM SERPL-SCNC: 139 MMOL/L (ref 136–145)
TSH W FREE THYROID IF ABNORMAL: 0.68 UIU/ML (ref 0.27–4.2)

## 2025-04-20 LAB
THYROGLOB AB SERPL-ACNC: <1 IU/ML (ref 0–0.9)
THYROGLOBULIN: 0.6 NG/ML (ref 1.4–29.2)

## 2025-04-25 ENCOUNTER — OFFICE VISIT (OUTPATIENT)
Dept: ENDOCRINOLOGY | Age: 80
End: 2025-04-25
Payer: MEDICARE

## 2025-04-25 VITALS
RESPIRATION RATE: 20 BRPM | HEIGHT: 66 IN | OXYGEN SATURATION: 98 % | BODY MASS INDEX: 35.84 KG/M2 | SYSTOLIC BLOOD PRESSURE: 118 MMHG | DIASTOLIC BLOOD PRESSURE: 68 MMHG | WEIGHT: 223 LBS | HEART RATE: 74 BPM

## 2025-04-25 DIAGNOSIS — E55.9 VITAMIN D DEFICIENCY: ICD-10-CM

## 2025-04-25 DIAGNOSIS — Z86.39 HISTORY OF PRIMARY HYPERPARATHYROIDISM: ICD-10-CM

## 2025-04-25 DIAGNOSIS — E89.0 POSTSURGICAL HYPOTHYROIDISM: ICD-10-CM

## 2025-04-25 DIAGNOSIS — Z85.850 HISTORY OF THYROID CANCER: Primary | ICD-10-CM

## 2025-04-25 PROCEDURE — 3078F DIAST BP <80 MM HG: CPT | Performed by: INTERNAL MEDICINE

## 2025-04-25 PROCEDURE — 99214 OFFICE O/P EST MOD 30 MIN: CPT | Performed by: INTERNAL MEDICINE

## 2025-04-25 PROCEDURE — 3074F SYST BP LT 130 MM HG: CPT | Performed by: INTERNAL MEDICINE

## 2025-04-25 PROCEDURE — 1036F TOBACCO NON-USER: CPT | Performed by: INTERNAL MEDICINE

## 2025-04-25 PROCEDURE — 1123F ACP DISCUSS/DSCN MKR DOCD: CPT | Performed by: INTERNAL MEDICINE

## 2025-04-25 PROCEDURE — 1159F MED LIST DOCD IN RCRD: CPT | Performed by: INTERNAL MEDICINE

## 2025-04-25 PROCEDURE — G8417 CALC BMI ABV UP PARAM F/U: HCPCS | Performed by: INTERNAL MEDICINE

## 2025-04-25 PROCEDURE — G8427 DOCREV CUR MEDS BY ELIG CLIN: HCPCS | Performed by: INTERNAL MEDICINE

## 2025-04-25 PROCEDURE — G2211 COMPLEX E/M VISIT ADD ON: HCPCS | Performed by: INTERNAL MEDICINE

## 2025-04-25 RX ORDER — LEVOTHYROXINE SODIUM 150 UG/1
TABLET ORAL
Qty: 90 TABLET | Refills: 3 | Status: SHIPPED | OUTPATIENT
Start: 2025-04-25

## 2025-04-25 ASSESSMENT — ENCOUNTER SYMPTOMS
DIARRHEA: 0
CONSTIPATION: 0

## 2025-04-25 NOTE — PROGRESS NOTES
measuring 0.8 cm and the tumor in the left lobe measuring 0.15 cm, negative margins, no vascular invasion, no lymphatic invasion, no extrathyroidal extension; pT1a pNX).     Surgical complications: None.     Current symptoms: Denies neck lumps, lymphadenopathy, dysphagia, hoarseness.     Imaging:  Thyroid ultrasound 3/7/2019: Right lobe 1.69 x 1.66 x 4.54 cm.  In the superior right lobe there is a solid, heterogeneous nodule with both isoechoic and hypoechoic components measuring 0.83 x 1.09 x 1.15 cm containing peripheral blood flow and no obvious microcalcifications.  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  Isthmus measures 0.37 cm.  Left lobe 1.77 x 2.18 x 4.79 cm.  There is a cyst in the mid left lobe measuring 0.21 x 0.26 x 0.27 cm.  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  Neck ultrasound 5/22/2020: No abnormal lymph nodes.  Neck ultrasound 4/19/2022: No clearly suspicious adenopathy or mass.     Labs:  3/7/2019: TSH 1.930.  7/31/2019: TSH 0.007, free T4 2.10, Tg 0.2, Tg Ab <1.0.  12/6/2019: TSH 0.149, free T4 1.72, Tg 0.3, Tg Ab <1.0.  5/7/2020: TSH 0.282, free T4 1.63, Tg 0.2, Tg Ab <1.0.  11/10/2020: TSH 0.058, free T4 1.54, Tg 0.1, Tg Ab <1.0.  3/12/2021: TSH 2.010, Tg 0.2, Tg Ab <1.0.  10/13/2021: TSH 1.000, Tg 0.3, Tg Ab <1.0.  4/14/2022: TSH 0.794, Tg 0.3, Tg Ab <1.0.  7/7/2022: TSH 0.59, Tg 0.4, Tg Ab <1.0.  4/20/2023: TSH 0.71, Tg Ab <1.0.  4/24/2023: Tg 0.3, Tg Ab <1.0.  4/17/2024: TSH 0.46, Tg-NAVYA <2.0, Tg Ab 1.2.  4/18/2025: TSH 0.68, Tg 0.6, Tg Ab <1.0.        THYROID DISEASE     Presentation/Diagnosis: Postsurgical hypothyroidism.     Symptoms: See review of systems.       Treatment: Takes generic in AM correctly.        HYPERCALCEMIA     Presentation/Diagnosis: Primary hyperparathyroidism status post right superior parathyroidectomy 4/18/2019.     Risk

## 2025-05-01 ENCOUNTER — APPOINTMENT (OUTPATIENT)
Dept: URBAN - METROPOLITAN AREA CLINIC 24 | Facility: CLINIC | Age: 80
Setting detail: DERMATOLOGY
End: 2025-05-01

## 2025-05-01 DIAGNOSIS — Z71.89 OTHER SPECIFIED COUNSELING: ICD-10-CM

## 2025-05-01 DIAGNOSIS — L21.8 OTHER SEBORRHEIC DERMATITIS: ICD-10-CM | Status: INADEQUATELY CONTROLLED

## 2025-05-01 DIAGNOSIS — D18.0 HEMANGIOMA: ICD-10-CM

## 2025-05-01 DIAGNOSIS — L57.8 OTHER SKIN CHANGES DUE TO CHRONIC EXPOSURE TO NONIONIZING RADIATION: ICD-10-CM | Status: STABLE

## 2025-05-01 DIAGNOSIS — L82.1 OTHER SEBORRHEIC KERATOSIS: ICD-10-CM

## 2025-05-01 DIAGNOSIS — Z87.2 PERSONAL HISTORY OF DISEASES OF THE SKIN AND SUBCUTANEOUS TISSUE: ICD-10-CM

## 2025-05-01 PROBLEM — D18.01 HEMANGIOMA OF SKIN AND SUBCUTANEOUS TISSUE: Status: ACTIVE | Noted: 2025-05-01

## 2025-05-01 PROCEDURE — ? SUNSCREEN RECOMMENDATIONS

## 2025-05-01 PROCEDURE — ? PRESCRIPTION MEDICATION MANAGEMENT

## 2025-05-01 PROCEDURE — ? PRESCRIPTION

## 2025-05-01 PROCEDURE — 99214 OFFICE O/P EST MOD 30 MIN: CPT

## 2025-05-01 PROCEDURE — ? COUNSELING

## 2025-05-01 RX ORDER — KETOCONAZOLE 20 MG/G
CREAM TOPICAL
Qty: 30 | Refills: 3 | Status: ERX | COMMUNITY
Start: 2025-05-01

## 2025-05-01 RX ADMIN — KETOCONAZOLE: 20 CREAM TOPICAL at 00:00

## 2025-05-01 ASSESSMENT — LOCATION DETAILED DESCRIPTION DERM
LOCATION DETAILED: LEFT INFERIOR MEDIAL UPPER BACK
LOCATION DETAILED: LEFT ANTERIOR PROXIMAL THIGH
LOCATION DETAILED: RIGHT SUPERIOR UPPER BACK
LOCATION DETAILED: SUPERIOR THORACIC SPINE
LOCATION DETAILED: EPIGASTRIC SKIN
LOCATION DETAILED: RIGHT ANTERIOR PROXIMAL THIGH
LOCATION DETAILED: GLABELLA
LOCATION DETAILED: RIGHT POSTERIOR SHOULDER

## 2025-05-01 ASSESSMENT — LOCATION SIMPLE DESCRIPTION DERM
LOCATION SIMPLE: RIGHT SHOULDER
LOCATION SIMPLE: UPPER BACK
LOCATION SIMPLE: LEFT UPPER BACK
LOCATION SIMPLE: GLABELLA
LOCATION SIMPLE: LEFT THIGH
LOCATION SIMPLE: RIGHT THIGH
LOCATION SIMPLE: ABDOMEN
LOCATION SIMPLE: RIGHT UPPER BACK

## 2025-05-01 ASSESSMENT — LOCATION ZONE DERM
LOCATION ZONE: ARM
LOCATION ZONE: FACE
LOCATION ZONE: TRUNK
LOCATION ZONE: LEG

## 2025-05-01 NOTE — PROCEDURE: PRESCRIPTION MEDICATION MANAGEMENT
Initiate Treatment: Ketoconazole cream bid aa
Render In Strict Bullet Format?: No
Detail Level: Simple

## 2025-05-17 DIAGNOSIS — I10 ESSENTIAL (PRIMARY) HYPERTENSION: ICD-10-CM

## 2025-05-20 RX ORDER — DOXAZOSIN 8 MG/1
TABLET ORAL
Qty: 90 TABLET | Refills: 1 | Status: SHIPPED | OUTPATIENT
Start: 2025-05-20

## 2025-06-02 ENCOUNTER — OFFICE VISIT (OUTPATIENT)
Age: 80
End: 2025-06-02
Payer: MEDICARE

## 2025-06-02 ENCOUNTER — CLINICAL SUPPORT (OUTPATIENT)
Age: 80
End: 2025-06-02
Payer: MEDICARE

## 2025-06-02 VITALS
DIASTOLIC BLOOD PRESSURE: 76 MMHG | BODY MASS INDEX: 35.81 KG/M2 | HEART RATE: 70 BPM | WEIGHT: 222.8 LBS | HEIGHT: 66 IN | SYSTOLIC BLOOD PRESSURE: 134 MMHG

## 2025-06-02 DIAGNOSIS — I50.22 SYSTOLIC CHF, CHRONIC (HCC): Primary | ICD-10-CM

## 2025-06-02 DIAGNOSIS — E11.65 TYPE 2 DIABETES MELLITUS WITH HYPERGLYCEMIA, WITHOUT LONG-TERM CURRENT USE OF INSULIN (HCC): Chronic | ICD-10-CM

## 2025-06-02 DIAGNOSIS — I10 PRIMARY HYPERTENSION: ICD-10-CM

## 2025-06-02 DIAGNOSIS — Z95.0 BIVENTRICULAR CARDIAC PACEMAKER IN SITU: Chronic | ICD-10-CM

## 2025-06-02 DIAGNOSIS — E78.5 DYSLIPIDEMIA: ICD-10-CM

## 2025-06-02 PROCEDURE — 3075F SYST BP GE 130 - 139MM HG: CPT | Performed by: INTERNAL MEDICINE

## 2025-06-02 PROCEDURE — 1126F AMNT PAIN NOTED NONE PRSNT: CPT | Performed by: INTERNAL MEDICINE

## 2025-06-02 PROCEDURE — 93281 PM DEVICE PROGR EVAL MULTI: CPT | Performed by: INTERNAL MEDICINE

## 2025-06-02 PROCEDURE — G8428 CUR MEDS NOT DOCUMENT: HCPCS | Performed by: INTERNAL MEDICINE

## 2025-06-02 PROCEDURE — 1036F TOBACCO NON-USER: CPT | Performed by: INTERNAL MEDICINE

## 2025-06-02 PROCEDURE — G8417 CALC BMI ABV UP PARAM F/U: HCPCS | Performed by: INTERNAL MEDICINE

## 2025-06-02 PROCEDURE — 1123F ACP DISCUSS/DSCN MKR DOCD: CPT | Performed by: INTERNAL MEDICINE

## 2025-06-02 PROCEDURE — 3078F DIAST BP <80 MM HG: CPT | Performed by: INTERNAL MEDICINE

## 2025-06-02 PROCEDURE — 3044F HG A1C LEVEL LT 7.0%: CPT | Performed by: INTERNAL MEDICINE

## 2025-06-02 PROCEDURE — 99214 OFFICE O/P EST MOD 30 MIN: CPT | Performed by: INTERNAL MEDICINE

## 2025-06-02 RX ORDER — CETIRIZINE HYDROCHLORIDE 10 MG/1
10 TABLET ORAL DAILY
COMMUNITY

## 2025-06-02 ASSESSMENT — ENCOUNTER SYMPTOMS
ABDOMINAL PAIN: 0
BACK PAIN: 0
COUGH: 0
SHORTNESS OF BREATH: 0

## 2025-06-02 NOTE — PROGRESS NOTES
[facility] CARDIOLOGY  [address] [place] [address], [address]  [place] [address]      25      NAME:  Abner Gabriel  : 1945  MRN: [id number]      SUBJECTIVE:   Abner Gabriel is a 79 y.o. male seen for a follow up visit regarding the following:     Chief Complaint   Patient presents with    Congestive Heart Failure    Hypertension       HPI:    79 y.o. male with history of dyspnea and chest pain and normal echo and stress testing in .  He developed CHB and symptomatic bradycardia s/p PM.  His echo showed severe reduction in EF that occurred in the face of chronic RV pacing.  He had normal coronaries and EF normalized with BiV pacing.  He is NYHA class II CHF.  Echo 2025 was again normal with EF 55-60%.  Patient has struggled with chronic dyspnea.         Past Medical History, Past Surgical History, Family history, Social History, and Medications were all reviewed with the patient today and updated as necessary.     Current Outpatient Medications   Medication Sig Dispense Refill    cetirizine (ZYRTEC) 10 MG tablet Take 1 tablet by mouth daily      doxazosin (CARDURA) 8 MG tablet TAKE 1 TABLET EVERY NIGHT FOR HIGH BLOOD PRESSURE AND ENLARGED PROSTATE WITH URINATION PROBLEM 90 tablet 1    levothyroxine (SYNTHROID) 150 MCG tablet 1 tablet once a day except for 1/2 tablet on Sundays 90 tablet 3    vitamin D (CHOLECALCIFEROL) 125 MCG (5000 UT) CAPS capsule Take 1 capsule by mouth daily 1 capsule 0    glipiZIDE (GLUCOTROL) 10 MG tablet TAKE 2 TABLETS IN THE MORNING AND 1 TABLET AT BEDTIME 270 tablet 4    pioglitazone (ACTOS) 15 MG tablet Take 1 tablet by mouth daily 90 tablet 3    lisinopril (PRINIVIL;ZESTRIL) 5 MG tablet TAKE 1 TABLET EVERY DAY 90 tablet 1    blood glucose test strips (CONTOUR NEXT TEST) strip Testing BS one time a day as directed, E11.9 100 each 2    metoprolol succinate (TOPROL XL) 25 MG extended release tablet Take 1 tablet by mouth daily 90 tablet 3    gemfibrozil

## 2025-06-27 ENCOUNTER — OFFICE VISIT (OUTPATIENT)
Dept: FAMILY MEDICINE CLINIC | Facility: CLINIC | Age: 80
End: 2025-06-27

## 2025-06-27 VITALS
RESPIRATION RATE: 18 BRPM | HEART RATE: 84 BPM | DIASTOLIC BLOOD PRESSURE: 80 MMHG | WEIGHT: 220 LBS | SYSTOLIC BLOOD PRESSURE: 128 MMHG | HEIGHT: 67 IN | OXYGEN SATURATION: 96 % | BODY MASS INDEX: 34.53 KG/M2 | TEMPERATURE: 97.1 F

## 2025-06-27 DIAGNOSIS — I10 ESSENTIAL (PRIMARY) HYPERTENSION: Primary | ICD-10-CM

## 2025-06-27 DIAGNOSIS — E89.0 POSTSURGICAL HYPOTHYROIDISM: ICD-10-CM

## 2025-06-27 DIAGNOSIS — E78.1 HIGH TRIGLYCERIDES: ICD-10-CM

## 2025-06-27 DIAGNOSIS — M65.332 ACQUIRED TRIGGER FINGER OF BOTH MIDDLE FINGERS: ICD-10-CM

## 2025-06-27 DIAGNOSIS — I50.22 SYSTOLIC CHF, CHRONIC (HCC): ICD-10-CM

## 2025-06-27 DIAGNOSIS — E78.5 DYSLIPIDEMIA: ICD-10-CM

## 2025-06-27 DIAGNOSIS — Z85.46 HISTORY OF PROSTATE CANCER: ICD-10-CM

## 2025-06-27 DIAGNOSIS — J30.2 SEASONAL ALLERGIES: ICD-10-CM

## 2025-06-27 DIAGNOSIS — Z12.5 SCREENING FOR MALIGNANT NEOPLASM OF PROSTATE: ICD-10-CM

## 2025-06-27 DIAGNOSIS — E11.65 TYPE 2 DIABETES MELLITUS WITH HYPERGLYCEMIA, WITHOUT LONG-TERM CURRENT USE OF INSULIN (HCC): Chronic | ICD-10-CM

## 2025-06-27 DIAGNOSIS — K21.9 GASTROESOPHAGEAL REFLUX DISEASE WITHOUT ESOPHAGITIS: ICD-10-CM

## 2025-06-27 DIAGNOSIS — M65.331 ACQUIRED TRIGGER FINGER OF BOTH MIDDLE FINGERS: ICD-10-CM

## 2025-06-27 RX ORDER — KETOCONAZOLE 20 MG/G
CREAM TOPICAL
COMMUNITY
Start: 2025-05-01

## 2025-06-27 RX ORDER — GEMFIBROZIL 600 MG/1
600 TABLET, FILM COATED ORAL
Qty: 90 TABLET | Refills: 3 | Status: SHIPPED | OUTPATIENT
Start: 2025-06-27 | End: 2026-06-22

## 2025-06-27 RX ORDER — LISINOPRIL 5 MG/1
TABLET ORAL
Qty: 90 TABLET | Refills: 3 | Status: SHIPPED | OUTPATIENT
Start: 2025-06-27

## 2025-06-27 SDOH — ECONOMIC STABILITY: FOOD INSECURITY: WITHIN THE PAST 12 MONTHS, THE FOOD YOU BOUGHT JUST DIDN'T LAST AND YOU DIDN'T HAVE MONEY TO GET MORE.: NEVER TRUE

## 2025-06-27 SDOH — ECONOMIC STABILITY: FOOD INSECURITY: WITHIN THE PAST 12 MONTHS, YOU WORRIED THAT YOUR FOOD WOULD RUN OUT BEFORE YOU GOT MONEY TO BUY MORE.: NEVER TRUE

## 2025-06-27 ASSESSMENT — PATIENT HEALTH QUESTIONNAIRE - PHQ9
2. FEELING DOWN, DEPRESSED OR HOPELESS: NOT AT ALL
SUM OF ALL RESPONSES TO PHQ QUESTIONS 1-9: 0
1. LITTLE INTEREST OR PLEASURE IN DOING THINGS: NOT AT ALL
SUM OF ALL RESPONSES TO PHQ QUESTIONS 1-9: 0

## 2025-06-28 NOTE — PROGRESS NOTES
in 1995. Persistent weak urinary stream and erectile dysfunction for over 5 years.    Type 2 diabetes managed with glipizide and Actos. Needs medication refill. A1c in 01/2025 was 6.4.    Intermittent trigger finger symptoms, manageable. Injection received on 07/03/2024.    Hypothyroidism managed by Dr. Zepeda, TSH 0.68 in 04/2025. Annual visits with Dr. Zepeda.    Chronic systolic congestive heart failure with recovered ejection fraction of 55-60% post-biventricular pacemaker. Cardiologist visit in 06/2025 confirmed stability. On metoprolol and lisinopril.    On atorvastatin for cholesterol, LDL 70 last year. Lipid panel needed soon.    GERD controlled with medication.    No seasonal allergies.    PAST SURGICAL HISTORY:  Laser therapy for prostate cancer in 1995  Biventricular pacemaker placement.    Review of Systems   All other pertinent systems reviewed and negative except as noted in the HPI.    Objective   Physical Exam   General: Well appearing, well nourished, NAD.   Head: NCAT   Eyes:conjunctiva clear, sclera non-icteric  ENT: TMs translucent & mobile, hearing intact.   Nose: No lesions, septum and turbinates normal  Oral cavity : No lesions, moist mucous membranes, no exudates or tonsillar hypertrophy   Neck: Supple, without lesions  Heart: RRR, No m/r/g  No JVD  Lungs:CTAB, no wheezes or rhonchi.  Respirations even, unlabored  GI: Normal bowel sounds, non tender, non distended  Skin: Good turgor, no rash, unusual bruising or prominent lesions  Extremities: No cyanosis or edema,  peripheral pulses intact, Capillary refill <3 sec. Trigger finger on bilateral middle finger   Musculoskeletal: ROM intact. No swelling or erythema.  Neurologic: No focal deficits. Sensation intact.    Psychiatric: AAO X3, Appropriate judgment and insight, normal mood and affect.     An electronic signature was used to authenticate this note.    --Damion Díaz DO

## 2025-07-03 DIAGNOSIS — E11.65 TYPE 2 DIABETES MELLITUS WITH HYPERGLYCEMIA, WITHOUT LONG-TERM CURRENT USE OF INSULIN (HCC): Chronic | ICD-10-CM

## 2025-07-03 DIAGNOSIS — I10 ESSENTIAL (PRIMARY) HYPERTENSION: ICD-10-CM

## 2025-07-03 DIAGNOSIS — Z12.5 SCREENING FOR MALIGNANT NEOPLASM OF PROSTATE: ICD-10-CM

## 2025-07-03 DIAGNOSIS — E78.5 DYSLIPIDEMIA: ICD-10-CM

## 2025-07-03 LAB
CHOLEST SERPL-MCNC: 114 MG/DL (ref 0–200)
CREAT UR-MCNC: 189 MG/DL (ref 39–259)
EST. AVERAGE GLUCOSE BLD GHB EST-MCNC: 153 MG/DL
HBA1C MFR BLD: 7 % (ref 0–5.6)
HDLC SERPL-MCNC: 33 MG/DL (ref 40–60)
HDLC SERPL: 3.4 (ref 0–5)
LDLC SERPL CALC-MCNC: 51 MG/DL (ref 0–100)
MICROALBUMIN UR-MCNC: <1.2 MG/DL (ref 0–20)
MICROALBUMIN/CREAT UR-RTO: NORMAL MG/G (ref 0–30)
PSA SERPL-MCNC: <0.1 NG/ML (ref 0–4)
TRIGL SERPL-MCNC: 149 MG/DL (ref 0–150)
VLDLC SERPL CALC-MCNC: 30 MG/DL (ref 6–23)

## 2025-07-07 ENCOUNTER — RESULTS FOLLOW-UP (OUTPATIENT)
Dept: FAMILY MEDICINE CLINIC | Facility: CLINIC | Age: 80
End: 2025-07-07

## 2025-08-06 ENCOUNTER — OFFICE VISIT (OUTPATIENT)
Dept: FAMILY MEDICINE CLINIC | Facility: CLINIC | Age: 80
End: 2025-08-06

## 2025-08-06 VITALS
BODY MASS INDEX: 34.72 KG/M2 | WEIGHT: 216 LBS | HEIGHT: 66 IN | SYSTOLIC BLOOD PRESSURE: 120 MMHG | DIASTOLIC BLOOD PRESSURE: 80 MMHG | TEMPERATURE: 96.9 F | RESPIRATION RATE: 18 BRPM | HEART RATE: 83 BPM | OXYGEN SATURATION: 99 %

## 2025-08-06 DIAGNOSIS — R09.81 NASAL CONGESTION WITH RHINORRHEA: ICD-10-CM

## 2025-08-06 DIAGNOSIS — I10 ESSENTIAL (PRIMARY) HYPERTENSION: ICD-10-CM

## 2025-08-06 DIAGNOSIS — M79.641 PAIN IN BOTH HANDS: ICD-10-CM

## 2025-08-06 DIAGNOSIS — M65.331 TRIGGER FINGER, RIGHT MIDDLE FINGER: Primary | ICD-10-CM

## 2025-08-06 DIAGNOSIS — J34.89 NASAL CONGESTION WITH RHINORRHEA: ICD-10-CM

## 2025-08-06 DIAGNOSIS — M65.332 TRIGGER FINGER, LEFT MIDDLE FINGER: ICD-10-CM

## 2025-08-06 DIAGNOSIS — E11.65 TYPE 2 DIABETES MELLITUS WITH HYPERGLYCEMIA, WITHOUT LONG-TERM CURRENT USE OF INSULIN (HCC): ICD-10-CM

## 2025-08-06 DIAGNOSIS — E66.811 OBESITY (BMI 30.0-34.9): ICD-10-CM

## 2025-08-06 DIAGNOSIS — M79.642 PAIN IN BOTH HANDS: ICD-10-CM

## 2025-08-06 RX ORDER — METHYLPREDNISOLONE ACETATE 40 MG/ML
20 INJECTION, SUSPENSION INTRA-ARTICULAR; INTRALESIONAL; INTRAMUSCULAR; SOFT TISSUE ONCE
Status: COMPLETED | OUTPATIENT
Start: 2025-08-06 | End: 2025-08-06

## 2025-08-06 RX ORDER — BROMPHENIRAMINE MALEATE, PSEUDOEPHEDRINE HYDROCHLORIDE, AND DEXTROMETHORPHAN HYDROBROMIDE 2; 30; 10 MG/5ML; MG/5ML; MG/5ML
5 SYRUP ORAL 4 TIMES DAILY PRN
Qty: 140 ML | Refills: 0 | Status: SHIPPED | OUTPATIENT
Start: 2025-08-06 | End: 2025-08-13

## 2025-08-06 RX ADMIN — METHYLPREDNISOLONE ACETATE 20 MG: 40 INJECTION, SUSPENSION INTRA-ARTICULAR; INTRALESIONAL; INTRAMUSCULAR; SOFT TISSUE at 15:14

## 2025-08-06 RX ADMIN — METHYLPREDNISOLONE ACETATE 20 MG: 40 INJECTION, SUSPENSION INTRA-ARTICULAR; INTRALESIONAL; INTRAMUSCULAR; SOFT TISSUE at 15:16

## 2025-08-06 SDOH — ECONOMIC STABILITY: FOOD INSECURITY: WITHIN THE PAST 12 MONTHS, YOU WORRIED THAT YOUR FOOD WOULD RUN OUT BEFORE YOU GOT MONEY TO BUY MORE.: NEVER TRUE

## 2025-08-06 SDOH — ECONOMIC STABILITY: FOOD INSECURITY: WITHIN THE PAST 12 MONTHS, THE FOOD YOU BOUGHT JUST DIDN'T LAST AND YOU DIDN'T HAVE MONEY TO GET MORE.: NEVER TRUE

## 2025-08-06 ASSESSMENT — PATIENT HEALTH QUESTIONNAIRE - PHQ9
SUM OF ALL RESPONSES TO PHQ QUESTIONS 1-9: 0
SUM OF ALL RESPONSES TO PHQ QUESTIONS 1-9: 0
1. LITTLE INTEREST OR PLEASURE IN DOING THINGS: NOT AT ALL
SUM OF ALL RESPONSES TO PHQ QUESTIONS 1-9: 0
2. FEELING DOWN, DEPRESSED OR HOPELESS: NOT AT ALL
SUM OF ALL RESPONSES TO PHQ QUESTIONS 1-9: 0

## (undated) DEVICE — CONTAINER,SPECIMEN,O.R.STRL,4.5OZ: Brand: MEDLINE

## (undated) DEVICE — SUT SLK 4-0 18IN TIE MP BLK --

## (undated) DEVICE — MASTISOL ADHESIVE LIQ 2/3ML

## (undated) DEVICE — 2000CC GUARDIAN II: Brand: GUARDIAN

## (undated) DEVICE — AMD ANTIMICROBIAL NON-ADHERENT PAD,0.2% POLYHEXAMETHYLENE BIGUANIDE HCI (PHMB): Brand: TELFA

## (undated) DEVICE — SOLUTION IV 1000ML 0.9% SOD CHL

## (undated) DEVICE — (D)STRIP SKN CLSR 0.5X4IN WHT --

## (undated) DEVICE — SUTURE PERMAHAND SZ 3-0 L18IN NONABSORBABLE BLK SILK BRAID A184H

## (undated) DEVICE — INSULATED BLADE ELECTRODE: Brand: EDGE

## (undated) DEVICE — PLASMABLADE X PS210-030S-LIGHT 3.0SL: Brand: PLASMABLADE™ X

## (undated) DEVICE — TRAY PREP DRY W/ PREM GLV 2 APPL 6 SPNG 2 UNDPD 1 OVERWRAP

## (undated) DEVICE — SUTURE PERMAHAND SZ 2-0 L18IN NONABSORBABLE BLK L26MM PS 1588H

## (undated) DEVICE — STRETCH BANDAGE ROLL: Brand: DERMACEA

## (undated) DEVICE — SUTURE V-LOC 90 3-0 L9IN ABSRB VLT L26MM V-20 1/2 CIR TAPR VLOCM0644

## (undated) DEVICE — SPONGE: SPECIALTY PEANUT XR 100/CS: Brand: MEDICAL ACTION INDUSTRIES

## (undated) DEVICE — Device

## (undated) DEVICE — AMD ANTIMICROBIAL SUPER SPONGES,MEDIUM: Brand: KERLIX

## (undated) DEVICE — APPLIER RMFG CLP LIG SM 9IN --

## (undated) DEVICE — SUTURE VCRL SZ 3-0 L27IN ABSRB UD L26MM SH 1/2 CIR J416H

## (undated) DEVICE — DRAPE TWL SURG 16X26IN BLU ORB04] ALLCARE INC]

## (undated) DEVICE — DRAPE,TOP,102X53,STERILE: Brand: MEDLINE

## (undated) DEVICE — SUTURE PERMAHAND SZ 2-0 L12X18IN NONABSORBABLE BLK SILK A185H

## (undated) DEVICE — SURGICAL PROCEDURE PACK BASIC ST FRANCIS

## (undated) DEVICE — SUT SLK 3-0 30IN SH BLK --

## (undated) DEVICE — BUTTON SWITCH PENCIL BLADE ELECTRODE, HOLSTER: Brand: EDGE

## (undated) DEVICE — SHEET, DRAPE, SPLIT, STERILE: Brand: MEDLINE

## (undated) DEVICE — AGENT HEMSTAT W2XL14IN OXIDIZED REGENERATED CELOS ABSRB FOR

## (undated) DEVICE — DRESSING POSTOP AG PRISMASEAL 3.5X6IN

## (undated) DEVICE — AMD ANTIMICROBIAL GAUZE SPONGES,12 PLY USP TYPE VII, 0.2% POLYHEXAMETHYLENE BIGUANIDE HCI (PHMB): Brand: CURITY

## (undated) DEVICE — SUTURE MCRYL SZ 5-0 L18IN ABSRB UD L13MM P-3 3/8 CIR PRIM Y493G

## (undated) DEVICE — REM POLYHESIVE ADULT PATIENT RETURN ELECTRODE: Brand: VALLEYLAB

## (undated) DEVICE — KENDALL SCD EXPRESS SLEEVES, KNEE LENGTH, MEDIUM: Brand: KENDALL SCD

## (undated) DEVICE — PACKING 8004003 NEURAY 200PK 25X25MM: Brand: NEURAY ®

## (undated) DEVICE — SUTURE ABSORBABLE BRAIDED 2-0 CT-1 27 IN UD VICRYL J259H

## (undated) DEVICE — APPLIER CLP LIG SM TI PREM SURGCLP SUPER INTLOK 20 DISP

## (undated) DEVICE — HEAD AND NECK: Brand: MEDLINE INDUSTRIES, INC.

## (undated) DEVICE — SUTURE VCRL SZ 3-0 L18IN ABSRB VLT L17MM RB-1 1/2 CIR J713D